# Patient Record
Sex: FEMALE | Race: WHITE | NOT HISPANIC OR LATINO | URBAN - METROPOLITAN AREA
[De-identification: names, ages, dates, MRNs, and addresses within clinical notes are randomized per-mention and may not be internally consistent; named-entity substitution may affect disease eponyms.]

---

## 2018-11-30 PROBLEM — Z00.00 ENCOUNTER FOR PREVENTIVE HEALTH EXAMINATION: Status: ACTIVE | Noted: 2018-11-30

## 2018-12-12 ENCOUNTER — OUTPATIENT (OUTPATIENT)
Dept: OUTPATIENT SERVICES | Facility: HOSPITAL | Age: 63
LOS: 1 days | End: 2018-12-12
Payer: MEDICAID

## 2018-12-12 ENCOUNTER — APPOINTMENT (OUTPATIENT)
Dept: GYNECOLOGIC ONCOLOGY | Facility: CLINIC | Age: 63
End: 2018-12-12
Payer: MEDICAID

## 2018-12-12 ENCOUNTER — RESULT REVIEW (OUTPATIENT)
Age: 63
End: 2018-12-12

## 2018-12-12 VITALS
WEIGHT: 248 LBS | SYSTOLIC BLOOD PRESSURE: 130 MMHG | BODY MASS INDEX: 45.64 KG/M2 | HEIGHT: 62 IN | DIASTOLIC BLOOD PRESSURE: 90 MMHG

## 2018-12-12 DIAGNOSIS — N95.0 POSTMENOPAUSAL BLEEDING: ICD-10-CM

## 2018-12-12 DIAGNOSIS — Z86.39 PERSONAL HISTORY OF OTHER ENDOCRINE, NUTRITIONAL AND METABOLIC DISEASE: ICD-10-CM

## 2018-12-12 DIAGNOSIS — Z80.8 FAMILY HISTORY OF MALIGNANT NEOPLASM OF OTHER ORGANS OR SYSTEMS: ICD-10-CM

## 2018-12-12 DIAGNOSIS — Z80.0 FAMILY HISTORY OF MALIGNANT NEOPLASM OF DIGESTIVE ORGANS: ICD-10-CM

## 2018-12-12 DIAGNOSIS — E03.9 HYPOTHYROIDISM, UNSPECIFIED: ICD-10-CM

## 2018-12-12 DIAGNOSIS — Z80.6 FAMILY HISTORY OF LEUKEMIA: ICD-10-CM

## 2018-12-12 DIAGNOSIS — Z86.79 PERSONAL HISTORY OF OTHER DISEASES OF THE CIRCULATORY SYSTEM: ICD-10-CM

## 2018-12-12 DIAGNOSIS — N85.02 ENDOMETRIAL INTRAEPITHELIAL NEOPLASIA [EIN]: ICD-10-CM

## 2018-12-12 DIAGNOSIS — E11.9 TYPE 2 DIABETES MELLITUS W/OUT COMPLICATIONS: ICD-10-CM

## 2018-12-12 DIAGNOSIS — Z78.9 OTHER SPECIFIED HEALTH STATUS: ICD-10-CM

## 2018-12-12 DIAGNOSIS — R60.0 LOCALIZED EDEMA: ICD-10-CM

## 2018-12-12 DIAGNOSIS — C54.1 MALIGNANT NEOPLASM OF ENDOMETRIUM: ICD-10-CM

## 2018-12-12 DIAGNOSIS — E66.9 OBESITY, UNSPECIFIED: ICD-10-CM

## 2018-12-12 DIAGNOSIS — I10 ESSENTIAL (PRIMARY) HYPERTENSION: ICD-10-CM

## 2018-12-12 PROCEDURE — 88321 CONSLTJ&REPRT SLD PREP ELSWR: CPT

## 2018-12-12 PROCEDURE — 99205 OFFICE O/P NEW HI 60 MIN: CPT

## 2018-12-12 RX ORDER — AMLODIPINE BESYLATE 5 MG/1
TABLET ORAL
Refills: 0 | Status: ACTIVE | COMMUNITY

## 2018-12-12 RX ORDER — HYDROCHLOROTHIAZIDE 12.5 MG/1
TABLET ORAL
Refills: 0 | Status: ACTIVE | COMMUNITY

## 2018-12-12 RX ORDER — ATENOLOL 50 MG/1
TABLET ORAL
Refills: 0 | Status: ACTIVE | COMMUNITY

## 2018-12-12 RX ORDER — ASPIRIN 81 MG
81 TABLET, DELAYED RELEASE (ENTERIC COATED) ORAL
Refills: 0 | Status: ACTIVE | COMMUNITY

## 2018-12-12 RX ORDER — GLIMEPIRIDE 4 MG/1
TABLET ORAL
Refills: 0 | Status: ACTIVE | COMMUNITY

## 2018-12-12 RX ORDER — ALOGLIPTIN 25 MG/1
TABLET, FILM COATED ORAL
Refills: 0 | Status: ACTIVE | COMMUNITY

## 2018-12-12 RX ORDER — GLYBURIDE 2.5 MG/1
TABLET ORAL
Refills: 0 | Status: ACTIVE | COMMUNITY

## 2018-12-12 RX ORDER — DULOXETINE HYDROCHLORIDE 30 MG/1
CAPSULE, DELAYED RELEASE ORAL
Refills: 0 | Status: ACTIVE | COMMUNITY

## 2018-12-12 RX ORDER — ATORVASTATIN CALCIUM 80 MG/1
TABLET, FILM COATED ORAL
Refills: 0 | Status: ACTIVE | COMMUNITY

## 2018-12-12 RX ORDER — LEVOTHYROXINE SODIUM 0.17 MG/1
TABLET ORAL
Refills: 0 | Status: ACTIVE | COMMUNITY

## 2018-12-12 RX ORDER — LOSARTAN POTASSIUM 100 MG/1
TABLET, FILM COATED ORAL
Refills: 0 | Status: ACTIVE | COMMUNITY

## 2018-12-13 LAB — SURGICAL PATHOLOGY STUDY: SIGNIFICANT CHANGE UP

## 2018-12-18 ENCOUNTER — OTHER (OUTPATIENT)
Age: 63
End: 2018-12-18

## 2019-01-15 ENCOUNTER — CHART COPY (OUTPATIENT)
Age: 64
End: 2019-01-15

## 2019-02-20 ENCOUNTER — RESULT REVIEW (OUTPATIENT)
Age: 64
End: 2019-02-20

## 2019-02-26 ENCOUNTER — APPOINTMENT (OUTPATIENT)
Dept: PLASTIC SURGERY | Facility: CLINIC | Age: 64
End: 2019-02-26
Payer: MEDICAID

## 2019-02-26 VITALS
SYSTOLIC BLOOD PRESSURE: 226 MMHG | OXYGEN SATURATION: 97 % | HEIGHT: 62 IN | DIASTOLIC BLOOD PRESSURE: 110 MMHG | HEART RATE: 74 BPM | BODY MASS INDEX: 45.64 KG/M2 | WEIGHT: 248 LBS

## 2019-02-26 DIAGNOSIS — C54.1 MALIGNANT NEOPLASM OF ENDOMETRIUM: ICD-10-CM

## 2019-02-26 PROCEDURE — 99243 OFF/OP CNSLTJ NEW/EST LOW 30: CPT

## 2019-03-05 PROBLEM — C54.1 ENDOMETRIAL ADENOCARCINOMA: Status: ACTIVE | Noted: 2018-12-12

## 2019-03-05 NOTE — HISTORY OF PRESENT ILLNESS
[FreeTextEntry1] : 63 year old , referred by Dr. Macdonald for FIGO grade 1 endometrioid adenocarcinoma. Patient presents today to discuss panniculectomy to be performed at the same time. \par \par Patient went to her GYN, Dr. Escalante, with complaints of postmenopausal bleeding. Pelvic sonogram showed a uterus that measures 11.9 x 8.4 x 4.3 cm with a thickened endometrium and several polyps. D&C on 2018 revealed endometrial hyperplasia with atypia in the endometrial biopsy and FIGO grade 1 endometrial adenocarcinoma in the endometrial polyp. **slides requested for review**\par \par Pelvic US (2018, [place]):\par  Uterus- 11.9 x 8.4 x 4.3 cm; bulky and elongated with prominent posterior subserosal fibroid measuring up to 5 cm. Fundus of uterus is in close proximity to anterior abdominal wall\par  Endometrium- abnormal appearance of endometrial lining. Small amount of fluid are present within the endometrial cavity. Larger endometrial polyp or mass is detected in the central endometrial lining measuring in the range of 2 x 4 cm. Imaging of the lower segment of the uterus in the region of the cervix suggest the presence of echogenic contents within the endometrial cavity and proximal cervical canal\par  Rt. ovary- not seen\par  Lt. ovary- not seen\par  Cul-de-sac- no FF\par \par Surgical pathology ([facility], 2018): D&C\par  Endometrium-endometrial curettage: endometrial hyperplasia with cytologic atypia\par  Endometrium-polyp: endometrial adenocarcinoma, endometrioid type, FIGO grade 1\par \par The patient had seen, Dr. Vibha Hood (GYN/ONC) on 8/10/2018 who recommended a CT scan to r/o metastatic disease and to have a robotic TLH/BSO with frozen section and LND if deep invasion is noted due to her risk of lymphedema. Then, based on final pathology she may require adjuvant radiation treatment. The patient had been scheduled to proceed with surgery, however, Dr. Hood had an accident and cannot do the surgery. \par \par Today, Ms. Macias complains of abnormal vaginal bleeding for many years. She suffers from chronic bilateral LE swelling. She denies abdominal pain/bloating/distention, pelvic discomfort, changes in normal bowel/urinary habits, nausea/vomiting, unintentional weight gain/loss, and all other associated signs and symptoms. She is here to discuss further surgical management. \par \par Health Maintenance:\par Pap- 2018- negative per patient\par Mammo- 2018- negative per patient\par Colonoscopy- denies\par SBE- educated on monthly breast exam\par CBE- 2018 \par

## 2019-03-05 NOTE — PHYSICAL EXAM
[NI] : Normal [de-identified] : large overhanging pannus down to the knees, no sign of infection, no palpable hernia

## 2019-03-13 ENCOUNTER — APPOINTMENT (OUTPATIENT)
Dept: VASCULAR SURGERY | Facility: CLINIC | Age: 64
End: 2019-03-13
Payer: MEDICAID

## 2019-03-13 VITALS
HEART RATE: 76 BPM | TEMPERATURE: 98 F | HEIGHT: 62 IN | DIASTOLIC BLOOD PRESSURE: 106 MMHG | SYSTOLIC BLOOD PRESSURE: 207 MMHG

## 2019-03-13 DIAGNOSIS — I83.899 VARICOSE VEINS OF UNSPECIFIED LOWER EXTREMITY WITH OTHER COMPLICATIONS: ICD-10-CM

## 2019-03-13 DIAGNOSIS — I87.2 VENOUS INSUFFICIENCY (CHRONIC) (PERIPHERAL): ICD-10-CM

## 2019-03-13 DIAGNOSIS — I83.819 VARICOSE VEINS OF UNSPECIFIED LOWER EXTREMITY WITH PAIN: ICD-10-CM

## 2019-03-13 PROCEDURE — 99204 OFFICE O/P NEW MOD 45 MIN: CPT

## 2019-03-13 PROCEDURE — 93970 EXTREMITY STUDY: CPT

## 2019-03-13 PROCEDURE — 93979 VASCULAR STUDY: CPT

## 2019-03-13 NOTE — DATA REVIEWED
[FreeTextEntry1] : Outside facility  report reviewed 1/2/2019 CT Abd Pelvis w/o contrast  no evidence of  thrombosis \par \par 3/13/2019 Venous Doppler Chris le no acute dvt svt \par                            RLE no sono evidence of reflux\par                           LLE no sono evidence of reflux\par \par 3/13/2019  Abd Venous Duplex Venous Duplex  patent bilateral Iliac veins ,  s/o mid IVC thrombosis or compression limited study \par

## 2019-03-13 NOTE — ASSESSMENT
[Arterial/Venous Disease] : arterial/venous disease [Other: _____] : [unfilled] [FreeTextEntry1] : Impression symptomatic venous insuff  s/o due to ivc compression or thrombus\par \par \par Plan Med Conservative management leg elevation, knee high compression stockings 20-30mm Hg vs knee high custom comp stockings  w zipper 20-30mm Hg (rx given), wt loss, diet control\par reviewed w pt and   most recent Sr Cr 1.6\par advised pt to f/u w primary MD for f/u renal fx eval\par pt will need  CTV of Abd/Plevis w iv contrast  to eval ivc patency, pt is aware of the risk of renal insuff \par pt to decide\par \par

## 2019-03-13 NOTE — PHYSICAL EXAM
[Normal Breath Sounds] : Normal breath sounds [1+] : left 1+ [2+] : left 2+ [Ankle Swelling (On Exam)] : present [Ankle Swelling Bilaterally] : severe [Varicose Veins Of Lower Extremities] : bilaterally [Ankle Swelling On The Right] : mild [] : bilaterally [Ankle Swelling On The Left] : moderate [No HSM] : no hepatosplenomegaly [No Rash or Lesion] : No rash or lesion [Alert] : alert [Oriented to Person] : oriented to person [Oriented to Place] : oriented to place [Oriented to Time] : oriented to time [Calm] : calm [JVD] : no jugular venous distention  [Abdomen Masses] : No abdominal masses [Tender] : was nontender [Stool Sample Taken] : No stool obtained  on rectal exam [de-identified] : nad [de-identified] : wnl [FreeTextEntry1] : Moderate bilateral leg venous insufficiency \par w moderate bilateral leg stasis dermatitis \par and moderate to severe bilateral  leg edema \par Multiple  bilateral eg small varicose  veins and spider veins  calf and shin \par no wounds/ulcers\par  [de-identified] : wnl [de-identified] : wnl [de-identified] : Chris Cranial nerves 2-12 chris grossly intact [de-identified] : cooperative

## 2019-03-13 NOTE — HISTORY OF PRESENT ILLNESS
[FreeTextEntry1] : Pt c/o bilateral leg pain swelling heaviness and discomfort worse w activity\par Onset sev years ago \par Intensity mod to severe \par Pt denies recent injury, travel or thrombophilic event\par \par pt was referred by Dr Macdonald to [facility] c/o \par \par \par

## 2020-04-03 ENCOUNTER — INPATIENT (INPATIENT)
Facility: HOSPITAL | Age: 65
LOS: 19 days | DRG: 207 | End: 2020-04-23
Attending: PSYCHIATRY & NEUROLOGY | Admitting: SURGERY
Payer: MEDICAID

## 2020-04-03 VITALS
HEART RATE: 86 BPM | SYSTOLIC BLOOD PRESSURE: 175 MMHG | OXYGEN SATURATION: 77 % | RESPIRATION RATE: 27 BRPM | DIASTOLIC BLOOD PRESSURE: 74 MMHG

## 2020-04-03 DIAGNOSIS — J12.9 VIRAL PNEUMONIA, UNSPECIFIED: ICD-10-CM

## 2020-04-03 LAB
4/8 RATIO: 1.86 RATIO — SIGNIFICANT CHANGE UP (ref 0.9–3.6)
ABS CD8: 77 /UL — LOW (ref 142–740)
ALBUMIN SERPL ELPH-MCNC: 2.4 G/DL — LOW (ref 3.3–5)
ALBUMIN SERPL ELPH-MCNC: 2.6 G/DL — LOW (ref 3.3–5)
ALP SERPL-CCNC: 76 U/L — SIGNIFICANT CHANGE UP (ref 40–120)
ALP SERPL-CCNC: 84 U/L — SIGNIFICANT CHANGE UP (ref 40–120)
ALT FLD-CCNC: 14 U/L — SIGNIFICANT CHANGE UP (ref 10–45)
ALT FLD-CCNC: 9 U/L — LOW (ref 10–45)
ANION GAP SERPL CALC-SCNC: 15 MMOL/L — SIGNIFICANT CHANGE UP (ref 5–17)
ANION GAP SERPL CALC-SCNC: 19 MMOL/L — HIGH (ref 5–17)
APTT BLD: 38.8 SEC — HIGH (ref 27.5–36.3)
APTT BLD: 43.3 SEC — HIGH (ref 27.5–36.3)
AST SERPL-CCNC: 24 U/L — SIGNIFICANT CHANGE UP (ref 10–40)
AST SERPL-CCNC: 33 U/L — SIGNIFICANT CHANGE UP (ref 10–40)
BASOPHILS # BLD AUTO: 0.02 K/UL — SIGNIFICANT CHANGE UP (ref 0–0.2)
BASOPHILS NFR BLD AUTO: 0.2 % — SIGNIFICANT CHANGE UP (ref 0–2)
BILIRUB SERPL-MCNC: 0.2 MG/DL — SIGNIFICANT CHANGE UP (ref 0.2–1.2)
BILIRUB SERPL-MCNC: <0.1 MG/DL — LOW (ref 0.2–1.2)
BUN SERPL-MCNC: 65 MG/DL — HIGH (ref 7–23)
BUN SERPL-MCNC: 67 MG/DL — HIGH (ref 7–23)
CALCIUM SERPL-MCNC: 8.6 MG/DL — SIGNIFICANT CHANGE UP (ref 8.4–10.5)
CALCIUM SERPL-MCNC: 8.7 MG/DL — SIGNIFICANT CHANGE UP (ref 8.4–10.5)
CD3 BLASTS SPEC-ACNC: 232 /UL — LOW (ref 672–1870)
CD3 BLASTS SPEC-ACNC: 62 % — SIGNIFICANT CHANGE UP (ref 59–83)
CD4 %: 39 % — SIGNIFICANT CHANGE UP (ref 30–62)
CD8 %: 21 % — SIGNIFICANT CHANGE UP (ref 12–36)
CHLORIDE SERPL-SCNC: 102 MMOL/L — SIGNIFICANT CHANGE UP (ref 96–108)
CHLORIDE SERPL-SCNC: 103 MMOL/L — SIGNIFICANT CHANGE UP (ref 96–108)
CK SERPL-CCNC: 80 U/L — SIGNIFICANT CHANGE UP (ref 25–170)
CO2 SERPL-SCNC: 14 MMOL/L — LOW (ref 22–31)
CO2 SERPL-SCNC: 18 MMOL/L — LOW (ref 22–31)
CREAT SERPL-MCNC: 3.93 MG/DL — HIGH (ref 0.5–1.3)
CREAT SERPL-MCNC: 4.35 MG/DL — HIGH (ref 0.5–1.3)
CRP SERPL-MCNC: 23.58 MG/DL — HIGH (ref 0–0.4)
CRP SERPL-MCNC: 25.98 MG/DL — HIGH (ref 0–0.4)
D DIMER BLD IA.RAPID-MCNC: 1010 NG/ML DDU — HIGH
D DIMER BLD IA.RAPID-MCNC: 822 NG/ML DDU — HIGH
D DIMER BLD IA.RAPID-MCNC: 865 NG/ML DDU — HIGH
EOSINOPHIL # BLD AUTO: 0 K/UL — SIGNIFICANT CHANGE UP (ref 0–0.5)
EOSINOPHIL NFR BLD AUTO: 0 % — SIGNIFICANT CHANGE UP (ref 0–6)
ERYTHROCYTE [SEDIMENTATION RATE] IN BLOOD: 120 MM/HR — HIGH (ref 0–20)
ERYTHROCYTE [SEDIMENTATION RATE] IN BLOOD: 81 MM/HR — HIGH (ref 0–20)
ERYTHROCYTE [SEDIMENTATION RATE] IN BLOOD: 87 MM/HR — HIGH (ref 0–20)
FERRITIN SERPL-MCNC: 195 NG/ML — HIGH (ref 15–150)
FERRITIN SERPL-MCNC: 195 NG/ML — HIGH (ref 15–150)
FERRITIN SERPL-MCNC: 216 NG/ML — HIGH (ref 15–150)
GAS PNL BLDA: SIGNIFICANT CHANGE UP
GAS PNL BLDV: SIGNIFICANT CHANGE UP
GLUCOSE SERPL-MCNC: 226 MG/DL — HIGH (ref 70–99)
GLUCOSE SERPL-MCNC: 243 MG/DL — HIGH (ref 70–99)
HCT VFR BLD CALC: 27.1 % — LOW (ref 34.5–45)
HCT VFR BLD CALC: 31.3 % — LOW (ref 34.5–45)
HGB BLD-MCNC: 8.4 G/DL — LOW (ref 11.5–15.5)
HGB BLD-MCNC: 9.5 G/DL — LOW (ref 11.5–15.5)
IMM GRANULOCYTES NFR BLD AUTO: 1 % — SIGNIFICANT CHANGE UP (ref 0–1.5)
INR BLD: 1.09 RATIO — SIGNIFICANT CHANGE UP (ref 0.88–1.16)
INR BLD: 1.12 RATIO — SIGNIFICANT CHANGE UP (ref 0.88–1.16)
LACTATE SERPL-SCNC: 0.5 MMOL/L — LOW (ref 0.7–2)
LDH SERPL L TO P-CCNC: 406 U/L — HIGH (ref 50–242)
LDH SERPL L TO P-CCNC: 503 U/L — HIGH (ref 50–242)
LYMPHOCYTES # BLD AUTO: 0.67 K/UL — LOW (ref 1–3.3)
LYMPHOCYTES # BLD AUTO: 7.4 % — LOW (ref 13–44)
MAGNESIUM SERPL-MCNC: 2.1 MG/DL — SIGNIFICANT CHANGE UP (ref 1.6–2.6)
MCHC RBC-ENTMCNC: 25.3 PG — LOW (ref 27–34)
MCHC RBC-ENTMCNC: 25.8 PG — LOW (ref 27–34)
MCHC RBC-ENTMCNC: 30.4 GM/DL — LOW (ref 32–36)
MCHC RBC-ENTMCNC: 31 GM/DL — LOW (ref 32–36)
MCV RBC AUTO: 83.1 FL — SIGNIFICANT CHANGE UP (ref 80–100)
MCV RBC AUTO: 83.2 FL — SIGNIFICANT CHANGE UP (ref 80–100)
MONOCYTES # BLD AUTO: 0.35 K/UL — SIGNIFICANT CHANGE UP (ref 0–0.9)
MONOCYTES NFR BLD AUTO: 3.9 % — SIGNIFICANT CHANGE UP (ref 2–14)
NEUTROPHILS # BLD AUTO: 7.94 K/UL — HIGH (ref 1.8–7.4)
NEUTROPHILS NFR BLD AUTO: 87.5 % — HIGH (ref 43–77)
NRBC # BLD: 0 /100 WBCS — SIGNIFICANT CHANGE UP (ref 0–0)
NRBC # BLD: 0 /100 WBCS — SIGNIFICANT CHANGE UP (ref 0–0)
PHOSPHATE SERPL-MCNC: 6.5 MG/DL — HIGH (ref 2.5–4.5)
PLATELET # BLD AUTO: 204 K/UL — SIGNIFICANT CHANGE UP (ref 150–400)
PLATELET # BLD AUTO: 216 K/UL — SIGNIFICANT CHANGE UP (ref 150–400)
POTASSIUM SERPL-MCNC: 4.3 MMOL/L — SIGNIFICANT CHANGE UP (ref 3.5–5.3)
POTASSIUM SERPL-MCNC: 4.8 MMOL/L — SIGNIFICANT CHANGE UP (ref 3.5–5.3)
POTASSIUM SERPL-SCNC: 4.3 MMOL/L — SIGNIFICANT CHANGE UP (ref 3.5–5.3)
POTASSIUM SERPL-SCNC: 4.8 MMOL/L — SIGNIFICANT CHANGE UP (ref 3.5–5.3)
PROCALCITONIN SERPL-MCNC: 0.57 NG/ML — HIGH (ref 0.02–0.1)
PROT SERPL-MCNC: 6.5 G/DL — SIGNIFICANT CHANGE UP (ref 6–8.3)
PROT SERPL-MCNC: 7.3 G/DL — SIGNIFICANT CHANGE UP (ref 6–8.3)
PROTHROM AB SERPL-ACNC: 12.5 SEC — SIGNIFICANT CHANGE UP (ref 10–12.9)
PROTHROM AB SERPL-ACNC: 12.8 SEC — SIGNIFICANT CHANGE UP (ref 10–12.9)
RBC # BLD: 3.26 M/UL — LOW (ref 3.8–5.2)
RBC # BLD: 3.76 M/UL — LOW (ref 3.8–5.2)
RBC # FLD: 13.1 % — SIGNIFICANT CHANGE UP (ref 10.3–14.5)
RBC # FLD: 13.2 % — SIGNIFICANT CHANGE UP (ref 10.3–14.5)
SODIUM SERPL-SCNC: 135 MMOL/L — SIGNIFICANT CHANGE UP (ref 135–145)
SODIUM SERPL-SCNC: 136 MMOL/L — SIGNIFICANT CHANGE UP (ref 135–145)
T-CELL CD4 SUBSET PNL BLD: 144 /UL — LOW (ref 489–1457)
TROPONIN T, HIGH SENSITIVITY RESULT: 88 NG/L — HIGH (ref 0–51)
WBC # BLD: 8.37 K/UL — SIGNIFICANT CHANGE UP (ref 3.8–10.5)
WBC # BLD: 9.07 K/UL — SIGNIFICANT CHANGE UP (ref 3.8–10.5)
WBC # FLD AUTO: 8.37 K/UL — SIGNIFICANT CHANGE UP (ref 3.8–10.5)
WBC # FLD AUTO: 9.07 K/UL — SIGNIFICANT CHANGE UP (ref 3.8–10.5)

## 2020-04-03 PROCEDURE — 99291 CRITICAL CARE FIRST HOUR: CPT | Mod: 25

## 2020-04-03 PROCEDURE — 71045 X-RAY EXAM CHEST 1 VIEW: CPT | Mod: 26

## 2020-04-03 PROCEDURE — 71045 X-RAY EXAM CHEST 1 VIEW: CPT | Mod: 26,77

## 2020-04-03 PROCEDURE — 36556 INSERT NON-TUNNEL CV CATH: CPT

## 2020-04-03 PROCEDURE — 31500 INSERT EMERGENCY AIRWAY: CPT

## 2020-04-03 PROCEDURE — 93010 ELECTROCARDIOGRAM REPORT: CPT | Mod: 59

## 2020-04-03 RX ORDER — ENOXAPARIN SODIUM 100 MG/ML
30 INJECTION SUBCUTANEOUS DAILY
Refills: 0 | Status: DISCONTINUED | OUTPATIENT
Start: 2020-04-03 | End: 2020-04-07

## 2020-04-03 RX ORDER — FAMOTIDINE 10 MG/ML
20 INJECTION INTRAVENOUS DAILY
Refills: 0 | Status: DISCONTINUED | OUTPATIENT
Start: 2020-04-03 | End: 2020-04-04

## 2020-04-03 RX ORDER — AMLODIPINE BESYLATE 2.5 MG/1
1 TABLET ORAL
Qty: 0 | Refills: 0 | DISCHARGE

## 2020-04-03 RX ORDER — CHLORHEXIDINE GLUCONATE 213 G/1000ML
1 SOLUTION TOPICAL
Refills: 0 | Status: DISCONTINUED | OUTPATIENT
Start: 2020-04-03 | End: 2020-04-04

## 2020-04-03 RX ORDER — KETAMINE HYDROCHLORIDE 100 MG/ML
100 INJECTION INTRAMUSCULAR; INTRAVENOUS ONCE
Refills: 0 | Status: DISCONTINUED | OUTPATIENT
Start: 2020-04-03 | End: 2020-04-04

## 2020-04-03 RX ORDER — HEPARIN SODIUM 5000 [USP'U]/ML
5000 INJECTION INTRAVENOUS; SUBCUTANEOUS EVERY 8 HOURS
Refills: 0 | Status: DISCONTINUED | OUTPATIENT
Start: 2020-04-03 | End: 2020-04-03

## 2020-04-03 RX ORDER — DEXMEDETOMIDINE HYDROCHLORIDE IN 0.9% SODIUM CHLORIDE 4 UG/ML
0.2 INJECTION INTRAVENOUS
Qty: 200 | Refills: 0 | Status: DISCONTINUED | OUTPATIENT
Start: 2020-04-03 | End: 2020-04-04

## 2020-04-03 RX ORDER — NOREPINEPHRINE BITARTRATE/D5W 8 MG/250ML
0.02 PLASTIC BAG, INJECTION (ML) INTRAVENOUS
Qty: 8 | Refills: 0 | Status: DISCONTINUED | OUTPATIENT
Start: 2020-04-03 | End: 2020-04-04

## 2020-04-03 RX ORDER — AZITHROMYCIN 500 MG/1
500 TABLET, FILM COATED ORAL ONCE
Refills: 0 | Status: COMPLETED | OUTPATIENT
Start: 2020-04-03 | End: 2020-04-03

## 2020-04-03 RX ORDER — CEFTRIAXONE 500 MG/1
1000 INJECTION, POWDER, FOR SOLUTION INTRAMUSCULAR; INTRAVENOUS ONCE
Refills: 0 | Status: COMPLETED | OUTPATIENT
Start: 2020-04-03 | End: 2020-04-03

## 2020-04-03 RX ORDER — ROCURONIUM BROMIDE 10 MG/ML
100 VIAL (ML) INTRAVENOUS ONCE
Refills: 0 | Status: COMPLETED | OUTPATIENT
Start: 2020-04-03 | End: 2020-04-03

## 2020-04-03 RX ORDER — FUROSEMIDE 40 MG
1 TABLET ORAL
Qty: 0 | Refills: 0 | DISCHARGE

## 2020-04-03 RX ORDER — LABETALOL HCL 100 MG
1 TABLET ORAL
Qty: 0 | Refills: 0 | DISCHARGE

## 2020-04-03 RX ORDER — ACETAMINOPHEN 500 MG
650 TABLET ORAL EVERY 6 HOURS
Refills: 0 | Status: DISCONTINUED | OUTPATIENT
Start: 2020-04-03 | End: 2020-04-04

## 2020-04-03 RX ORDER — HYDROXYCHLOROQUINE SULFATE 200 MG
400 TABLET ORAL EVERY 12 HOURS
Refills: 0 | Status: COMPLETED | OUTPATIENT
Start: 2020-04-03 | End: 2020-04-04

## 2020-04-03 RX ORDER — CHLORHEXIDINE GLUCONATE 213 G/1000ML
15 SOLUTION TOPICAL EVERY 12 HOURS
Refills: 0 | Status: DISCONTINUED | OUTPATIENT
Start: 2020-04-03 | End: 2020-04-04

## 2020-04-03 RX ORDER — ASPIRIN/CALCIUM CARB/MAGNESIUM 324 MG
1 TABLET ORAL
Qty: 0 | Refills: 0 | DISCHARGE

## 2020-04-03 RX ORDER — DULOXETINE HYDROCHLORIDE 30 MG/1
1 CAPSULE, DELAYED RELEASE ORAL
Qty: 0 | Refills: 0 | DISCHARGE

## 2020-04-03 RX ORDER — GLIMEPIRIDE 1 MG
1 TABLET ORAL
Qty: 0 | Refills: 0 | DISCHARGE

## 2020-04-03 RX ORDER — LEVOTHYROXINE SODIUM 125 MCG
1 TABLET ORAL
Qty: 0 | Refills: 0 | DISCHARGE

## 2020-04-03 RX ORDER — LINAGLIPTIN 5 MG/1
1 TABLET, FILM COATED ORAL
Qty: 0 | Refills: 0 | DISCHARGE

## 2020-04-03 RX ORDER — HYDROXYCHLOROQUINE SULFATE 200 MG
TABLET ORAL
Refills: 0 | Status: DISCONTINUED | OUTPATIENT
Start: 2020-04-03 | End: 2020-04-06

## 2020-04-03 RX ORDER — KETAMINE HYDROCHLORIDE 100 MG/ML
1 INJECTION INTRAMUSCULAR; INTRAVENOUS
Qty: 1000 | Refills: 0 | Status: DISCONTINUED | OUTPATIENT
Start: 2020-04-03 | End: 2020-04-04

## 2020-04-03 RX ORDER — PROPOFOL 10 MG/ML
45 INJECTION, EMULSION INTRAVENOUS
Qty: 500 | Refills: 0 | Status: DISCONTINUED | OUTPATIENT
Start: 2020-04-03 | End: 2020-04-13

## 2020-04-03 RX ORDER — HYDROXYCHLOROQUINE SULFATE 200 MG
200 TABLET ORAL EVERY 12 HOURS
Refills: 0 | Status: DISCONTINUED | OUTPATIENT
Start: 2020-04-04 | End: 2020-04-06

## 2020-04-03 RX ORDER — HYDRALAZINE HCL 50 MG
0 TABLET ORAL
Qty: 0 | Refills: 0 | DISCHARGE

## 2020-04-03 RX ORDER — ATORVASTATIN CALCIUM 80 MG/1
1 TABLET, FILM COATED ORAL
Qty: 0 | Refills: 0 | DISCHARGE

## 2020-04-03 RX ORDER — ETOMIDATE 2 MG/ML
20 INJECTION INTRAVENOUS ONCE
Refills: 0 | Status: COMPLETED | OUTPATIENT
Start: 2020-04-03 | End: 2020-04-03

## 2020-04-03 RX ORDER — NOREPINEPHRINE BITARTRATE/D5W 8 MG/250ML
0.01 PLASTIC BAG, INJECTION (ML) INTRAVENOUS
Qty: 16 | Refills: 0 | Status: DISCONTINUED | OUTPATIENT
Start: 2020-04-03 | End: 2020-04-07

## 2020-04-03 RX ADMIN — CHLORHEXIDINE GLUCONATE 15 MILLILITER(S): 213 SOLUTION TOPICAL at 17:26

## 2020-04-03 RX ADMIN — FAMOTIDINE 20 MILLIGRAM(S): 10 INJECTION INTRAVENOUS at 23:39

## 2020-04-03 RX ADMIN — CEFTRIAXONE 100 MILLIGRAM(S): 500 INJECTION, POWDER, FOR SOLUTION INTRAMUSCULAR; INTRAVENOUS at 13:59

## 2020-04-03 RX ADMIN — ETOMIDATE 20 MILLIGRAM(S): 2 INJECTION INTRAVENOUS at 12:55

## 2020-04-03 RX ADMIN — CEFTRIAXONE 1000 MILLIGRAM(S): 500 INJECTION, POWDER, FOR SOLUTION INTRAMUSCULAR; INTRAVENOUS at 14:10

## 2020-04-03 RX ADMIN — PROPOFOL 12 MICROGRAM(S)/KG/MIN: 10 INJECTION, EMULSION INTRAVENOUS at 14:33

## 2020-04-03 RX ADMIN — Medication 100 MILLIGRAM(S): at 12:50

## 2020-04-03 RX ADMIN — CHLORHEXIDINE GLUCONATE 1 APPLICATION(S): 213 SOLUTION TOPICAL at 22:00

## 2020-04-03 RX ADMIN — AZITHROMYCIN 250 MILLIGRAM(S): 500 TABLET, FILM COATED ORAL at 13:59

## 2020-04-03 NOTE — ED PROVIDER NOTE - ATTENDING CONTRIBUTION TO CARE
65F, pmh htn, hld, DM II, presents with sob, worsening over past week, fever. Pt was satting 70%, was using family member's O2 bringin sat to 80-90%. Today desaturated which prompted ED visit. Pt dyspneic, in distress on arrival, unable to obtain further hx.    PE: NAD, NCAT, MMM, Trachea midline, Normal conjunctiva, lungs with decreased breath sounds throughout, tachypnea, increased wob, S1/S2 RRR, Normal perfusion, 2+ radial pulses bilat, Abdomen Soft, NTND, No rebound/guarding, No LE edema, No deformity of extremities, No rashes,  No focal motor or sensory deficits.     Pt hypoxic to 60% on arrival on RA. Placed on NRB with 6L NC with improvement in saturation to only low 80's. With tachypnea, increased wob, and unable to speak in full sentences. Given inability to improve oxygenation further, decision made to intubate, pt agreeable and family informed. To obtain labs, cxr. Admit to ICU. - Lion Treadwell MD

## 2020-04-03 NOTE — ED ADULT NURSE NOTE - NSIMPLEMENTINTERV_GEN_ALL_ED
Implemented All Fall with Harm Risk Interventions:  [place] to call system. Call bell, personal items and telephone within reach. Instruct patient to call for assistance. Room bathroom lighting operational. Non-slip footwear when patient is off stretcher. Physically safe environment: no spills, clutter or unnecessary equipment. Stretcher in lowest position, wheels locked, appropriate side rails in place. Provide visual cue, wrist band, yellow gown, etc. Monitor gait and stability. Monitor for mental status changes and reorient to person, place, and time. Review medications for side effects contributing to fall risk. Reinforce activity limits and safety measures with patient and family. Provide visual clues: red socks.

## 2020-04-03 NOTE — ED PROVIDER NOTE - PROGRESS NOTE DETAILS
Pt intubated successfully. During intubation, first attempt unsuccessful. Attempted to bag patient unsuccessfully, then second attempt successful. Did desaturate to upper 50's for very brief period however rapid improvement once tube secured. Pt sedated with propofol s/p intubation. Accepted to ICU dr chacon. - Lion Treadwell MD

## 2020-04-03 NOTE — ED PROVIDER NOTE - CRITICAL CARE PROVIDED
additional history taking/consult w/ pt's family directly relating to pts condition/interpretation of diagnostic studies/consultation with other physicians/documentation/direct patient care (not related to procedure)

## 2020-04-03 NOTE — ED PROVIDER NOTE - OBJECTIVE STATEMENT
64 yo female with htn dm hld presenting with SOB for one week. per patient's , patient had one week of SOB, fevers. Was saturating 70% at home, where patient used family member's oxygen, bringing up to 80-90%. today was 60% on RA so came to ED for further evaluation.     Denies N/V/D, CP

## 2020-04-03 NOTE — ED PROVIDER NOTE - CLINICAL SUMMARY MEDICAL DECISION MAKING FREE TEXT BOX
64 yo female with htn dm hld presenting with SOB for one week. suggestive of respiratory distress 2/2 viral pna such as covid-19. will likely intubate for airway management, will obtain cbc cmp crp vbg, cxr, will reassess

## 2020-04-03 NOTE — ED ADULT NURSE NOTE - OBJECTIVE STATEMENT
pT IS AN AMBULATORY 65 YR OLD FEMALE A/O x 2 BIBA FOR sob, RESPIRATORY DISTRESS.  UPON ARRIVAL PT WAS 60% ON ROOM AIR AND WITH ASSISTANCE FROM nrb AND NASAL CANNULA NEVER WENT HIGHER THAN 85% oxygen saturation.  PERRL wnl, chen spontaneously.  LUNGS have diminished bs b/l.  SINUS tachycardia on cm at 105 bpm.  As per EMS patient has had fever and SOB.  No signs of N/V.  Abdomen NT ND.  Peripheral pulses.  Pt intubated at 1250 for respiratory distress at 1250 with size 7.5 tube, 25@ the lip line.  100 MG Rocoronium and 20 mg Etomidate used for RSI.  ET Tube confirmed with CXR.

## 2020-04-03 NOTE — PROCEDURE NOTE - NSTRACHPOSTINTU_RESP_A_CORE
Breath sounds equal/Chest X-Ray/Appropriate capnography/Breath sounds bilateral/Positive end tidal Co2 noted

## 2020-04-03 NOTE — ED PROVIDER NOTE - CARE PLAN
Principal Discharge DX:	Viral pneumonia Principal Discharge DX:	Viral pneumonia  Secondary Diagnosis:	Acute respiratory failure with hypoxia

## 2020-04-04 DIAGNOSIS — E78.5 HYPERLIPIDEMIA, UNSPECIFIED: ICD-10-CM

## 2020-04-04 DIAGNOSIS — I10 ESSENTIAL (PRIMARY) HYPERTENSION: ICD-10-CM

## 2020-04-04 DIAGNOSIS — B34.2 CORONAVIRUS INFECTION, UNSPECIFIED: ICD-10-CM

## 2020-04-04 DIAGNOSIS — E11.9 TYPE 2 DIABETES MELLITUS WITHOUT COMPLICATIONS: ICD-10-CM

## 2020-04-04 LAB
ALBUMIN SERPL ELPH-MCNC: 2.4 G/DL — LOW (ref 3.3–5)
ALP SERPL-CCNC: 70 U/L — SIGNIFICANT CHANGE UP (ref 40–120)
ALT FLD-CCNC: 10 U/L — SIGNIFICANT CHANGE UP (ref 10–45)
ANION GAP SERPL CALC-SCNC: 16 MMOL/L — SIGNIFICANT CHANGE UP (ref 5–17)
ANION GAP SERPL CALC-SCNC: 16 MMOL/L — SIGNIFICANT CHANGE UP (ref 5–17)
AST SERPL-CCNC: 16 U/L — SIGNIFICANT CHANGE UP (ref 10–40)
BILIRUB SERPL-MCNC: 0.1 MG/DL — LOW (ref 0.2–1.2)
BUN SERPL-MCNC: 67 MG/DL — HIGH (ref 7–23)
BUN SERPL-MCNC: 68 MG/DL — HIGH (ref 7–23)
CALCIUM SERPL-MCNC: 8.1 MG/DL — LOW (ref 8.4–10.5)
CALCIUM SERPL-MCNC: 8.6 MG/DL — SIGNIFICANT CHANGE UP (ref 8.4–10.5)
CHLORIDE SERPL-SCNC: 103 MMOL/L — SIGNIFICANT CHANGE UP (ref 96–108)
CHLORIDE SERPL-SCNC: 105 MMOL/L — SIGNIFICANT CHANGE UP (ref 96–108)
CK SERPL-CCNC: 48 U/L — SIGNIFICANT CHANGE UP (ref 25–170)
CO2 SERPL-SCNC: 15 MMOL/L — LOW (ref 22–31)
CO2 SERPL-SCNC: 17 MMOL/L — LOW (ref 22–31)
CREAT SERPL-MCNC: 4.6 MG/DL — HIGH (ref 0.5–1.3)
CREAT SERPL-MCNC: 4.89 MG/DL — HIGH (ref 0.5–1.3)
CRP SERPL-MCNC: 22.54 MG/DL — HIGH (ref 0–0.4)
FIBRINOGEN PPP-MCNC: 934 MG/DL — HIGH (ref 350–510)
GAS PNL BLDA: SIGNIFICANT CHANGE UP
GAS PNL BLDA: SIGNIFICANT CHANGE UP
GLUCOSE BLDC GLUCOMTR-MCNC: 138 MG/DL — HIGH (ref 70–99)
GLUCOSE BLDC GLUCOMTR-MCNC: 162 MG/DL — HIGH (ref 70–99)
GLUCOSE SERPL-MCNC: 125 MG/DL — HIGH (ref 70–99)
GLUCOSE SERPL-MCNC: 156 MG/DL — HIGH (ref 70–99)
HCT VFR BLD CALC: 25.3 % — LOW (ref 34.5–45)
HGB BLD-MCNC: 8.1 G/DL — LOW (ref 11.5–15.5)
INR BLD: 1.12 RATIO — SIGNIFICANT CHANGE UP (ref 0.88–1.16)
MAGNESIUM SERPL-MCNC: 2.1 MG/DL — SIGNIFICANT CHANGE UP (ref 1.6–2.6)
MAGNESIUM SERPL-MCNC: 2.2 MG/DL — SIGNIFICANT CHANGE UP (ref 1.6–2.6)
MCHC RBC-ENTMCNC: 26.4 PG — LOW (ref 27–34)
MCHC RBC-ENTMCNC: 32 GM/DL — SIGNIFICANT CHANGE UP (ref 32–36)
MCV RBC AUTO: 82.4 FL — SIGNIFICANT CHANGE UP (ref 80–100)
NRBC # BLD: 0 /100 WBCS — SIGNIFICANT CHANGE UP (ref 0–0)
NT-PROBNP SERPL-SCNC: 3479 PG/ML — HIGH (ref 0–300)
PHOSPHATE SERPL-MCNC: 7 MG/DL — HIGH (ref 2.5–4.5)
PLATELET # BLD AUTO: 216 K/UL — SIGNIFICANT CHANGE UP (ref 150–400)
POTASSIUM SERPL-MCNC: 4.3 MMOL/L — SIGNIFICANT CHANGE UP (ref 3.5–5.3)
POTASSIUM SERPL-MCNC: 4.4 MMOL/L — SIGNIFICANT CHANGE UP (ref 3.5–5.3)
POTASSIUM SERPL-SCNC: 4.3 MMOL/L — SIGNIFICANT CHANGE UP (ref 3.5–5.3)
POTASSIUM SERPL-SCNC: 4.4 MMOL/L — SIGNIFICANT CHANGE UP (ref 3.5–5.3)
PROCALCITONIN SERPL-MCNC: 0.8 NG/ML — HIGH (ref 0.02–0.1)
PROT SERPL-MCNC: 6 G/DL — SIGNIFICANT CHANGE UP (ref 6–8.3)
PROTHROM AB SERPL-ACNC: 12.8 SEC — SIGNIFICANT CHANGE UP (ref 10–12.9)
RBC # BLD: 3.07 M/UL — LOW (ref 3.8–5.2)
RBC # FLD: 13.3 % — SIGNIFICANT CHANGE UP (ref 10.3–14.5)
SARS-COV-2 RNA SPEC QL NAA+PROBE: DETECTED
SODIUM SERPL-SCNC: 136 MMOL/L — SIGNIFICANT CHANGE UP (ref 135–145)
SODIUM SERPL-SCNC: 136 MMOL/L — SIGNIFICANT CHANGE UP (ref 135–145)
TROPONIN T, HIGH SENSITIVITY RESULT: 93 NG/L — HIGH (ref 0–51)
WBC # BLD: 7.94 K/UL — SIGNIFICANT CHANGE UP (ref 3.8–10.5)
WBC # FLD AUTO: 7.94 K/UL — SIGNIFICANT CHANGE UP (ref 3.8–10.5)

## 2020-04-04 PROCEDURE — 93010 ELECTROCARDIOGRAM REPORT: CPT

## 2020-04-04 PROCEDURE — 99291 CRITICAL CARE FIRST HOUR: CPT

## 2020-04-04 RX ORDER — DEXTROSE 50 % IN WATER 50 %
25 SYRINGE (ML) INTRAVENOUS ONCE
Refills: 0 | Status: DISCONTINUED | OUTPATIENT
Start: 2020-04-04 | End: 2020-04-04

## 2020-04-04 RX ORDER — DULOXETINE HYDROCHLORIDE 30 MG/1
60 CAPSULE, DELAYED RELEASE ORAL DAILY
Refills: 0 | Status: DISCONTINUED | OUTPATIENT
Start: 2020-04-04 | End: 2020-04-04

## 2020-04-04 RX ORDER — LEVOTHYROXINE SODIUM 125 MCG
200 TABLET ORAL DAILY
Refills: 0 | Status: DISCONTINUED | OUTPATIENT
Start: 2020-04-04 | End: 2020-04-04

## 2020-04-04 RX ORDER — INSULIN LISPRO 100/ML
VIAL (ML) SUBCUTANEOUS AT BEDTIME
Refills: 0 | Status: DISCONTINUED | OUTPATIENT
Start: 2020-04-04 | End: 2020-04-04

## 2020-04-04 RX ORDER — DEXTROSE 50 % IN WATER 50 %
12.5 SYRINGE (ML) INTRAVENOUS ONCE
Refills: 0 | Status: DISCONTINUED | OUTPATIENT
Start: 2020-04-04 | End: 2020-04-04

## 2020-04-04 RX ORDER — INSULIN LISPRO 100/ML
VIAL (ML) SUBCUTANEOUS
Refills: 0 | Status: DISCONTINUED | OUTPATIENT
Start: 2020-04-04 | End: 2020-04-04

## 2020-04-04 RX ORDER — ASPIRIN/CALCIUM CARB/MAGNESIUM 324 MG
81 TABLET ORAL DAILY
Refills: 0 | Status: DISCONTINUED | OUTPATIENT
Start: 2020-04-04 | End: 2020-04-16

## 2020-04-04 RX ORDER — SODIUM CHLORIDE 9 MG/ML
1000 INJECTION, SOLUTION INTRAVENOUS
Refills: 0 | Status: DISCONTINUED | OUTPATIENT
Start: 2020-04-04 | End: 2020-04-04

## 2020-04-04 RX ORDER — LEVOTHYROXINE SODIUM 125 MCG
100 TABLET ORAL AT BEDTIME
Refills: 0 | Status: DISCONTINUED | OUTPATIENT
Start: 2020-04-04 | End: 2020-04-23

## 2020-04-04 RX ORDER — GLUCAGON INJECTION, SOLUTION 0.5 MG/.1ML
1 INJECTION, SOLUTION SUBCUTANEOUS ONCE
Refills: 0 | Status: DISCONTINUED | OUTPATIENT
Start: 2020-04-04 | End: 2020-04-04

## 2020-04-04 RX ORDER — DEXTROSE 50 % IN WATER 50 %
15 SYRINGE (ML) INTRAVENOUS ONCE
Refills: 0 | Status: DISCONTINUED | OUTPATIENT
Start: 2020-04-04 | End: 2020-04-04

## 2020-04-04 RX ORDER — ACETAMINOPHEN 500 MG
650 TABLET ORAL EVERY 6 HOURS
Refills: 0 | Status: DISCONTINUED | OUTPATIENT
Start: 2020-04-04 | End: 2020-04-12

## 2020-04-04 RX ORDER — INSULIN LISPRO 100/ML
VIAL (ML) SUBCUTANEOUS EVERY 6 HOURS
Refills: 0 | Status: DISCONTINUED | OUTPATIENT
Start: 2020-04-04 | End: 2020-04-09

## 2020-04-04 RX ORDER — KETAMINE HYDROCHLORIDE 100 MG/ML
1 INJECTION INTRAMUSCULAR; INTRAVENOUS
Qty: 1000 | Refills: 0 | Status: DISCONTINUED | OUTPATIENT
Start: 2020-04-04 | End: 2020-04-05

## 2020-04-04 RX ORDER — ATORVASTATIN CALCIUM 80 MG/1
20 TABLET, FILM COATED ORAL AT BEDTIME
Refills: 0 | Status: DISCONTINUED | OUTPATIENT
Start: 2020-04-04 | End: 2020-04-15

## 2020-04-04 RX ADMIN — ENOXAPARIN SODIUM 30 MILLIGRAM(S): 100 INJECTION SUBCUTANEOUS at 12:50

## 2020-04-04 RX ADMIN — Medication 400 MILLIGRAM(S): at 01:48

## 2020-04-04 RX ADMIN — DULOXETINE HYDROCHLORIDE 60 MILLIGRAM(S): 30 CAPSULE, DELAYED RELEASE ORAL at 12:50

## 2020-04-04 RX ADMIN — CHLORHEXIDINE GLUCONATE 15 MILLILITER(S): 213 SOLUTION TOPICAL at 05:25

## 2020-04-04 RX ADMIN — Medication 200 MICROGRAM(S): at 05:26

## 2020-04-04 RX ADMIN — FAMOTIDINE 20 MILLIGRAM(S): 10 INJECTION INTRAVENOUS at 12:50

## 2020-04-04 RX ADMIN — Medication 200 MILLIGRAM(S): at 21:45

## 2020-04-04 RX ADMIN — Medication 400 MILLIGRAM(S): at 12:50

## 2020-04-04 RX ADMIN — CHLORHEXIDINE GLUCONATE 1 APPLICATION(S): 213 SOLUTION TOPICAL at 05:25

## 2020-04-04 NOTE — H&P ADULT - ATTENDING COMMENTS
COVID sedation protocol.  Presently on Ketamine 1 mg per kilogram per hour, Propofol 30 microgram per kilogram per hour.  FIORDALIZA -2   synchronous on ventilator.  Will work to decrease Ketamine  Levophed at low dose  PEEP 14 and 30 percent oxygen with acceptable gas exchange  following ARDS net high PEEP protocol  creatinine is 3.9 and urine output of 10.  CVP is 10 suggestive of optimal volume status and will continue to closely monitor renal function.  goal will be to balance I's and O's

## 2020-04-04 NOTE — H&P ADULT - ASSESSMENT
61 yo F admitted with + COVID- 19   -  upon admission she was intubated and is currently on the ventilator 64 yo F admitted with + COVID- 19   -  upon admission she was intubated and is currently on the ventilator 66 yo F admitted with + COVID- 19   -  upon admission she was intubated and is currently on the ventilator     Neuro   - propofol @35 mcg/kg/min  - Ketamine @ 1mg      CV  - Levo gtt 0.01 mcg/ kg/min     Pulmonary  - AC/CMV 32/320/30% /14  - plateau 26   - ABG 4/4  7.26/ 40/ 86/ 17/ -8.6/ 97%  - TV/PBW =  8.2   - P/F = 268     GI  - NPO, OGT       - Glass cath  - BUN/CREAT 67/4.89     Heme   - Lovenox 30 mg SQ daily  - H/H  8.1/25.3 --> 7.4/28     ID  - plaquenil

## 2020-04-04 NOTE — H&P ADULT - NSHPLABSRESULTS_GEN_ALL_CORE
8.1    7.94  )-----------( 216      ( 04 Apr 2020 03:17 )             25.3   04-04    136  |  103  |  68<H>  ----------------------------<  156<H>  4.3   |  17<L>  |  4.60<H>    Ca    8.6      04 Apr 2020 03:17  Phos  7.0     04-04  Mg     2.2     04-04    TPro  6.0  /  Alb  2.4<L>  /  TBili  0.1<L>  /  DBili  x   /  AST  16  /  ALT  10  /  AlkPhos  70  04-04 8.1    7.94  )-----------( 216      ( 04 Apr 2020 03:17 )             25.3   04-04    136  |  103  |  68<H>  ----------------------------<  156<H>  4.3   |  17<L>  |  4.60<H>    Ca    8.6      04 Apr 2020 03:17  Phos  7.0     04-04  Mg     2.2     04-04    TPro  6.0  /  Alb  2.4<L>  /  TBili  0.1<L>  /  DBili  x   /  AST  16  /  ALT  10  /  AlkPhos  70  04-04    EKG- NSR with

## 2020-04-04 NOTE — H&P ADULT - NSHPSOURCEINFOTX_GEN_ALL_CORE
unable to reach patient's spouse  message left on home phone , and patient is intubated history obtained from chart records

## 2020-04-04 NOTE — H&P ADULT - HISTORY OF PRESENT ILLNESS
64 yo female withHTN, DM, HLD,   presenting with SOB for one week. per patient's , patient had one week of SOB, fevers. Was saturating 70% at home, where patient used family member's oxygen, bringing up to 80-90%. today was 60% on RA so came to ED for further evaluation.       Unable to obtain ROS

## 2020-04-05 LAB
ALBUMIN SERPL ELPH-MCNC: 2.1 G/DL — LOW (ref 3.3–5)
ALP SERPL-CCNC: 73 U/L — SIGNIFICANT CHANGE UP (ref 40–120)
ALT FLD-CCNC: 9 U/L — LOW (ref 10–45)
ANION GAP SERPL CALC-SCNC: 17 MMOL/L — SIGNIFICANT CHANGE UP (ref 5–17)
ANION GAP SERPL CALC-SCNC: 17 MMOL/L — SIGNIFICANT CHANGE UP (ref 5–17)
AST SERPL-CCNC: 13 U/L — SIGNIFICANT CHANGE UP (ref 10–40)
BASOPHILS # BLD AUTO: 0.01 K/UL — SIGNIFICANT CHANGE UP (ref 0–0.2)
BASOPHILS NFR BLD AUTO: 0.2 % — SIGNIFICANT CHANGE UP (ref 0–2)
BILIRUB DIRECT SERPL-MCNC: <0.1 MG/DL — SIGNIFICANT CHANGE UP (ref 0–0.2)
BILIRUB INDIRECT FLD-MCNC: >0 MG/DL — LOW (ref 0.2–1)
BILIRUB SERPL-MCNC: 0.1 MG/DL — LOW (ref 0.2–1.2)
BUN SERPL-MCNC: 71 MG/DL — HIGH (ref 7–23)
BUN SERPL-MCNC: 73 MG/DL — HIGH (ref 7–23)
CALCIUM SERPL-MCNC: 7.5 MG/DL — LOW (ref 8.4–10.5)
CALCIUM SERPL-MCNC: 7.9 MG/DL — LOW (ref 8.4–10.5)
CHLORIDE SERPL-SCNC: 102 MMOL/L — SIGNIFICANT CHANGE UP (ref 96–108)
CHLORIDE SERPL-SCNC: 103 MMOL/L — SIGNIFICANT CHANGE UP (ref 96–108)
CO2 SERPL-SCNC: 14 MMOL/L — LOW (ref 22–31)
CO2 SERPL-SCNC: 16 MMOL/L — LOW (ref 22–31)
CREAT SERPL-MCNC: 5.24 MG/DL — HIGH (ref 0.5–1.3)
CREAT SERPL-MCNC: 5.52 MG/DL — HIGH (ref 0.5–1.3)
EOSINOPHIL # BLD AUTO: 0.03 K/UL — SIGNIFICANT CHANGE UP (ref 0–0.5)
EOSINOPHIL NFR BLD AUTO: 0.5 % — SIGNIFICANT CHANGE UP (ref 0–6)
G6PD RBC-CCNC: 17.4 U/G HGB — SIGNIFICANT CHANGE UP (ref 7–20.5)
GAS PNL BLDA: SIGNIFICANT CHANGE UP
GLUCOSE BLDC GLUCOMTR-MCNC: 127 MG/DL — HIGH (ref 70–99)
GLUCOSE BLDC GLUCOMTR-MCNC: 140 MG/DL — HIGH (ref 70–99)
GLUCOSE BLDC GLUCOMTR-MCNC: 175 MG/DL — HIGH (ref 70–99)
GLUCOSE BLDC GLUCOMTR-MCNC: 202 MG/DL — HIGH (ref 70–99)
GLUCOSE SERPL-MCNC: 163 MG/DL — HIGH (ref 70–99)
GLUCOSE SERPL-MCNC: 173 MG/DL — HIGH (ref 70–99)
HCT VFR BLD CALC: 24.6 % — LOW (ref 34.5–45)
HGB BLD-MCNC: 7.7 G/DL — LOW (ref 11.5–15.5)
IMM GRANULOCYTES NFR BLD AUTO: 1.8 % — HIGH (ref 0–1.5)
LYMPHOCYTES # BLD AUTO: 0.75 K/UL — LOW (ref 1–3.3)
LYMPHOCYTES # BLD AUTO: 12.1 % — LOW (ref 13–44)
MAGNESIUM SERPL-MCNC: 2.1 MG/DL — SIGNIFICANT CHANGE UP (ref 1.6–2.6)
MAGNESIUM SERPL-MCNC: 2.1 MG/DL — SIGNIFICANT CHANGE UP (ref 1.6–2.6)
MCHC RBC-ENTMCNC: 25.6 PG — LOW (ref 27–34)
MCHC RBC-ENTMCNC: 31.3 GM/DL — LOW (ref 32–36)
MCV RBC AUTO: 81.7 FL — SIGNIFICANT CHANGE UP (ref 80–100)
MONOCYTES # BLD AUTO: 0.38 K/UL — SIGNIFICANT CHANGE UP (ref 0–0.9)
MONOCYTES NFR BLD AUTO: 6.1 % — SIGNIFICANT CHANGE UP (ref 2–14)
NEUTROPHILS # BLD AUTO: 4.94 K/UL — SIGNIFICANT CHANGE UP (ref 1.8–7.4)
NEUTROPHILS NFR BLD AUTO: 79.3 % — HIGH (ref 43–77)
NRBC # BLD: 0 /100 WBCS — SIGNIFICANT CHANGE UP (ref 0–0)
PHOSPHATE SERPL-MCNC: 6.8 MG/DL — HIGH (ref 2.5–4.5)
PHOSPHATE SERPL-MCNC: 6.9 MG/DL — HIGH (ref 2.5–4.5)
PLATELET # BLD AUTO: 199 K/UL — SIGNIFICANT CHANGE UP (ref 150–400)
POTASSIUM SERPL-MCNC: 4.5 MMOL/L — SIGNIFICANT CHANGE UP (ref 3.5–5.3)
POTASSIUM SERPL-MCNC: 4.6 MMOL/L — SIGNIFICANT CHANGE UP (ref 3.5–5.3)
POTASSIUM SERPL-SCNC: 4.5 MMOL/L — SIGNIFICANT CHANGE UP (ref 3.5–5.3)
POTASSIUM SERPL-SCNC: 4.6 MMOL/L — SIGNIFICANT CHANGE UP (ref 3.5–5.3)
PROCALCITONIN SERPL-MCNC: 0.82 NG/ML — HIGH (ref 0.02–0.1)
PROT SERPL-MCNC: 5.7 G/DL — LOW (ref 6–8.3)
RBC # BLD: 3.01 M/UL — LOW (ref 3.8–5.2)
RBC # FLD: 13.2 % — SIGNIFICANT CHANGE UP (ref 10.3–14.5)
SODIUM SERPL-SCNC: 133 MMOL/L — LOW (ref 135–145)
SODIUM SERPL-SCNC: 136 MMOL/L — SIGNIFICANT CHANGE UP (ref 135–145)
WBC # BLD: 6.22 K/UL — SIGNIFICANT CHANGE UP (ref 3.8–10.5)
WBC # FLD AUTO: 6.22 K/UL — SIGNIFICANT CHANGE UP (ref 3.8–10.5)

## 2020-04-05 PROCEDURE — 99291 CRITICAL CARE FIRST HOUR: CPT

## 2020-04-05 RX ORDER — FUROSEMIDE 40 MG
20 TABLET ORAL DAILY
Refills: 0 | Status: DISCONTINUED | OUTPATIENT
Start: 2020-04-05 | End: 2020-04-05

## 2020-04-05 RX ORDER — ROCURONIUM BROMIDE 10 MG/ML
50 VIAL (ML) INTRAVENOUS ONCE
Refills: 0 | Status: DISCONTINUED | OUTPATIENT
Start: 2020-04-05 | End: 2020-04-07

## 2020-04-05 RX ORDER — POLYETHYLENE GLYCOL 3350 17 G/17G
17 POWDER, FOR SOLUTION ORAL DAILY
Refills: 0 | Status: DISCONTINUED | OUTPATIENT
Start: 2020-04-05 | End: 2020-04-06

## 2020-04-05 RX ORDER — CHLORHEXIDINE GLUCONATE 213 G/1000ML
15 SOLUTION TOPICAL EVERY 12 HOURS
Refills: 0 | Status: DISCONTINUED | OUTPATIENT
Start: 2020-04-05 | End: 2020-04-20

## 2020-04-05 RX ORDER — KETAMINE HYDROCHLORIDE 100 MG/ML
1 INJECTION INTRAMUSCULAR; INTRAVENOUS
Qty: 1000 | Refills: 0 | Status: DISCONTINUED | OUTPATIENT
Start: 2020-04-05 | End: 2020-04-07

## 2020-04-05 RX ORDER — MIDAZOLAM HYDROCHLORIDE 1 MG/ML
2 INJECTION, SOLUTION INTRAMUSCULAR; INTRAVENOUS ONCE
Refills: 0 | Status: DISCONTINUED | OUTPATIENT
Start: 2020-04-05 | End: 2020-04-05

## 2020-04-05 RX ORDER — SENNA PLUS 8.6 MG/1
2 TABLET ORAL AT BEDTIME
Refills: 0 | Status: DISCONTINUED | OUTPATIENT
Start: 2020-04-05 | End: 2020-04-10

## 2020-04-05 RX ORDER — DEXMEDETOMIDINE HYDROCHLORIDE IN 0.9% SODIUM CHLORIDE 4 UG/ML
0.5 INJECTION INTRAVENOUS
Qty: 400 | Refills: 0 | Status: DISCONTINUED | OUTPATIENT
Start: 2020-04-05 | End: 2020-04-12

## 2020-04-05 RX ADMIN — SENNA PLUS 2 TABLET(S): 8.6 TABLET ORAL at 21:11

## 2020-04-05 RX ADMIN — MIDAZOLAM HYDROCHLORIDE 2 MILLIGRAM(S): 1 INJECTION, SOLUTION INTRAMUSCULAR; INTRAVENOUS at 20:00

## 2020-04-05 RX ADMIN — Medication 2: at 00:19

## 2020-04-05 RX ADMIN — POLYETHYLENE GLYCOL 3350 17 GRAM(S): 17 POWDER, FOR SOLUTION ORAL at 12:04

## 2020-04-05 RX ADMIN — ATORVASTATIN CALCIUM 20 MILLIGRAM(S): 80 TABLET, FILM COATED ORAL at 21:11

## 2020-04-05 RX ADMIN — CHLORHEXIDINE GLUCONATE 15 MILLILITER(S): 213 SOLUTION TOPICAL at 07:31

## 2020-04-05 RX ADMIN — ENOXAPARIN SODIUM 30 MILLIGRAM(S): 100 INJECTION SUBCUTANEOUS at 12:04

## 2020-04-05 RX ADMIN — Medication 1 DROP(S): at 04:15

## 2020-04-05 RX ADMIN — Medication 200 MILLIGRAM(S): at 21:11

## 2020-04-05 RX ADMIN — Medication 100 MICROGRAM(S): at 21:12

## 2020-04-05 RX ADMIN — CHLORHEXIDINE GLUCONATE 15 MILLILITER(S): 213 SOLUTION TOPICAL at 21:08

## 2020-04-05 RX ADMIN — Medication 1 DROP(S): at 17:35

## 2020-04-05 RX ADMIN — Medication 2: at 12:04

## 2020-04-05 RX ADMIN — Medication 4: at 17:35

## 2020-04-05 RX ADMIN — Medication 200 MILLIGRAM(S): at 12:04

## 2020-04-05 RX ADMIN — Medication 81 MILLIGRAM(S): at 17:35

## 2020-04-05 NOTE — PROGRESS NOTE ADULT - SUBJECTIVE AND OBJECTIVE BOX
HISTORY  65y Female with COVID-19(+) pneumonia requiring intubation.    24 HOUR EVENTS:    SUBJECTIVE/ROS: Due to intubation with sedation, subjective information was not able to be obtained from the patient. History was obtained, to the extent possible, from review of the chart and collateral sources of information.      NEURO  Exam: sedated, no acute distress  Meds: acetaminophen    Suspension .. 650 milliGRAM(s) Enteral Tube every 6 hours PRN Temp greater or equal to 38C (100.4F)  ketamine Infusion. 1 mG/kG/Hr IV Continuous <Continuous>  propofol Infusion 35 MICROgram(s)/kG/Min IV Continuous <Continuous>    Adequacy of sedation and pain control has been assessed and adjusted.      RESPIRATORY  RR: 14 (11 - 32)  SpO2: 100% (93% - 100%)  Mechanical Ventilation: Mode: AC/ CMV (Assist Control/ Continuous Mandatory Ventilation), RR (machine): 32, TV (machine): 320, FiO2: 30, PEEP: 12  Predicted Body Weight: 45.5  Tidal Volume/PBW ratio: 7.03  PaO2 to FiO2 ratio: 286.6  Extubation readiness assessed.  ABG - ( 05 Apr 2020 01:21 )  pH: 7.24  /  pCO2: 41    /  pO2: 148   / HCO3: 17    / Base Excess: -9.3  /  SaO2: 99      Lactate: x                CARDIOVASCULAR  HR: 69 (66 - 73)  ABP: 139/56 (128/42 - 156/66)  ABP(mean): 86 (67 - 97)  CVP: 10  Cardiac Rhythm: sinus 70 bpm  Most recent QTc: 473 (4/4)  Meds:         GI/NUTRITION  Diet: Diet, NPO with Tube Feed:   Tube Feeding Modality: Orogastric  Pivot 1.5 Ilya (PIVOT1.5RTH)  Total Volume for 24 Hours (mL): 480  Continuous  Starting Tube Feed Rate mL per Hour: 20  Until Goal Tube Feed Rate (mL per Hour): 20  Tube Feed Duration (in Hours): 24  Tube Feed Start Time: 10:00 (04-04-20 @ 09:39)    Most recent bowel movement:  Meds/stress ulcer prophylaxis:       GENITOURINARY  I&O's Detail    04-03 @ 07:01  -  04-04 @ 07:00  --------------------------------------------------------  IN:    ketamine Infusion: 130 mL    norepinephrine Infusion: 90 mL    propofol Infusion: 300 mL  Total IN: 520 mL    OUT:    Indwelling Catheter - Urethral: 925 mL  Total OUT: 925 mL    Total NET: -405 mL      04-04 @ 07:01  - 04-05 @ 02:43  --------------------------------------------------------  IN:    ketamine Infusion: 130 mL    ketamine Infusion: 10 mL    ketamine Infusion.: 20 mL    norepinephrine Infusion: 5.4 mL    NSModularFluidAdult: 60 mL    propofol Infusion: 228 mL    propofol Infusion: 126 mL  Total IN: 579.4 mL    OUT:    Indwelling Catheter - Urethral: 75 mL  Total OUT: 75 mL    Total NET: 504.4 mL          04-04    136  |  105  |  67<H>  ----------------------------<  125<H>  4.4   |  15<L>  |  4.89<H>    Ca    8.1<L>      04 Apr 2020 14:21  Phos  7.0     04-04  Mg     2.1     04-04    TPro  6.0  /  Alb  2.4<L>  /  TBili  0.1<L>  /  DBili  x   /  AST  16  /  ALT  10  /  AlkPhos  70  04-04    [x] Glass catheter, indication: urine output monitoring in critically ill patient.  Meds:   Creatine Kinase, Serum: 48 (04-04-20 @ 03:17)        HEMATOLOGIC  Meds: aspirin  chewable 81 milliGRAM(s) Enteral Tube daily  enoxaparin Injectable 30 milliGRAM(s) SubCutaneous daily    VTE Prophylaxis:                         7.7    6.22  )-----------( 199      ( 05 Apr 2020 01:32 )             24.6     PT/INR - ( 04 Apr 2020 03:17 )   PT: 12.8 sec;   INR: 1.12 ratio         PTT - ( 03 Apr 2020 18:27 )  PTT:43.3 sec  19504-03-20 @ 22:28        INFECTIOUS DISEASES  T(C): 36.3, Max: 36.6 (04-04-20 @ 07:00)  WBC Count: 6.22 (04-05-20 @ 01:32)  WBC Count: 7.94 (04-04-20 @ 03:17)    Recent Cultures:  Specimen Source: .Blood Blood-Peripheral, 04-03 @ 17:12; Results   No growth to date.; Gram Stain: --; Organism: --  Specimen Source: .Blood Blood-Peripheral, 04-03 @ 17:11; Results   No growth to date.; Gram Stain: --; Organism: --    Meds: hydroxychloroquine   Oral   hydroxychloroquine 200 milliGRAM(s) Oral every 12 hours    Procalcitonin, Serum: 0.80 ng/mL (04-04-20 @ 03:17)        ENDOCRINE  162  138    Meds: atorvastatin 20 milliGRAM(s) Oral at bedtime  insulin lispro (HumaLOG) corrective regimen sliding scale   SubCutaneous every 6 hours  levothyroxine Injectable 100 MICROGram(s) IV Push at bedtime        ACCESS DEVICES:  [ ] Peripheral IV  [x] Central Venous Line	[ ] R	[ ] L	[ ] IJ	[ ] Fem	[ ] SC	Placed:   [x] Arterial Line		[ ] R	[ ] L	[ ] Fem	[ ] Rad	[ ] Ax	Placed:   [ ] [facility] HD Access             [ ] R [ ] L [ ] IJ  [ ] Fem     Placed:  [x] Urinary Catheter, Date Placed:   Necessity of urinary, arterial, and venous catheters discussed.      CODE STATUS:     IMAGING:

## 2020-04-05 NOTE — PROGRESS NOTE ADULT - ASSESSMENT
HPI:  64 yo female withHTN, DM, HLD,   presenting with SOB for one week. per patient's , patient had one week of SOB, fevers. Was saturating 70% at home, where patient used family member's oxygen, bringing up to 80-90%. today was 60% on RA so came to ED for further evaluation.     COVID +  Admitted 4/3/2020  Intubated 4/3/2020  Plaquenil started 4/3/2020 HPI:  66 yo female withHTN, DM, HLD,   presenting with SOB for one week. per patient's , patient had one week of SOB, fevers. Was saturating 70% at home, where patient used family member's oxygen, bringing up to 80-90%. today was 60% on RA so came to ED for further evaluation.     Neuro   - propofol @ 45 mcg/kg/min  - Ketamine @ 1mg    -precedex 0.5 mcg/ kg/ hr     CV  - CVP 7     Pulmonary  - AC/VC 32/320/30/8  - plateau 22  - ABG (4/5)  7.24/ 41/ 148/ 17/ -8.6/ 99%  -- TV/PBW =  8.2  - P/F = 493     GI  - NPO, OGT   - PIVOT @ 20cc/hr  - miralax and senna   - Nutrition consult ordered     [facility] cath  - BUN/CREAT   71/5.24     Heme   - Lovenox 30 mg SQ daily  - H/H  7.7/24.6     ID  - plaquenil 400 mg Q12 x2 (4/3)  - ceftriaxone 1000 mg IV x1 (4/3)  - Zithromax 500 mg IV x1 (4/3)    Lines   - L  IJ  triple lumen  -  L radial A-line

## 2020-04-05 NOTE — PROGRESS NOTE ADULT - ATTENDING COMMENTS
Propofol  at 45 microgram per kilogram per minute.  Ketamine 1 mg / kg / hour  Will start Precedex hoping to decrease propofol and ketamine  PEEP of 10 and 30 percent.  Will look to decrease PEEP if saturation acceptable    requires high respiratory rate to sustain ventilation  ARDS net high PEEP protocol  only 600 cc positive.  CVP is 8  creatinine elevated consistent with MARIO secondary to ATN.  Likely euvolemic  Blood Gas Arterial, Lactate: 0.8 mmol/L (04.05.20 @ 14:16)  40 minutes of critical care management applied

## 2020-04-06 DIAGNOSIS — N17.9 ACUTE KIDNEY FAILURE, UNSPECIFIED: ICD-10-CM

## 2020-04-06 DIAGNOSIS — E83.39 OTHER DISORDERS OF PHOSPHORUS METABOLISM: ICD-10-CM

## 2020-04-06 DIAGNOSIS — E83.51 HYPOCALCEMIA: ICD-10-CM

## 2020-04-06 DIAGNOSIS — E87.2 ACIDOSIS: ICD-10-CM

## 2020-04-06 LAB
ALBUMIN SERPL ELPH-MCNC: 2 G/DL — LOW (ref 3.3–5)
ALP SERPL-CCNC: 443 U/L — HIGH (ref 40–120)
ALT FLD-CCNC: 43 U/L — SIGNIFICANT CHANGE UP (ref 10–45)
ANION GAP SERPL CALC-SCNC: 16 MMOL/L — SIGNIFICANT CHANGE UP (ref 5–17)
ANION GAP SERPL CALC-SCNC: 18 MMOL/L — HIGH (ref 5–17)
APTT BLD: 57.4 SEC — HIGH (ref 27.5–36.3)
AST SERPL-CCNC: 77 U/L — HIGH (ref 10–40)
BASE EXCESS BLDA CALC-SCNC: -9.6 MMOL/L — LOW (ref -2–2)
BILIRUB SERPL-MCNC: 0.2 MG/DL — SIGNIFICANT CHANGE UP (ref 0.2–1.2)
BUN SERPL-MCNC: 76 MG/DL — HIGH (ref 7–23)
BUN SERPL-MCNC: 81 MG/DL — HIGH (ref 7–23)
CALCIUM SERPL-MCNC: 7.4 MG/DL — LOW (ref 8.4–10.5)
CALCIUM SERPL-MCNC: 7.4 MG/DL — LOW (ref 8.4–10.5)
CHLORIDE SERPL-SCNC: 102 MMOL/L — SIGNIFICANT CHANGE UP (ref 96–108)
CHLORIDE SERPL-SCNC: 102 MMOL/L — SIGNIFICANT CHANGE UP (ref 96–108)
CO2 BLDA-SCNC: 17 MMOL/L — LOW (ref 22–30)
CO2 SERPL-SCNC: 14 MMOL/L — LOW (ref 22–31)
CO2 SERPL-SCNC: 15 MMOL/L — LOW (ref 22–31)
CREAT SERPL-MCNC: 5.8 MG/DL — HIGH (ref 0.5–1.3)
CREAT SERPL-MCNC: 5.88 MG/DL — HIGH (ref 0.5–1.3)
G6PD RBC-CCNC: 16.5 U/G HGB — SIGNIFICANT CHANGE UP (ref 7–20.5)
GAS PNL BLDA: SIGNIFICANT CHANGE UP
GAS PNL BLDA: SIGNIFICANT CHANGE UP
GLUCOSE BLDC GLUCOMTR-MCNC: 141 MG/DL — HIGH (ref 70–99)
GLUCOSE BLDC GLUCOMTR-MCNC: 177 MG/DL — HIGH (ref 70–99)
GLUCOSE BLDC GLUCOMTR-MCNC: 186 MG/DL — HIGH (ref 70–99)
GLUCOSE BLDC GLUCOMTR-MCNC: 206 MG/DL — HIGH (ref 70–99)
GLUCOSE SERPL-MCNC: 138 MG/DL — HIGH (ref 70–99)
GLUCOSE SERPL-MCNC: 205 MG/DL — HIGH (ref 70–99)
HCO3 BLDA-SCNC: 16 MMOL/L — LOW (ref 21–29)
HCT VFR BLD CALC: 24.5 % — LOW (ref 34.5–45)
HGB BLD-MCNC: 7.6 G/DL — LOW (ref 11.5–15.5)
HOROWITZ INDEX BLDA+IHG-RTO: 30 — SIGNIFICANT CHANGE UP
INR BLD: 1.24 RATIO — HIGH (ref 0.88–1.16)
MAGNESIUM SERPL-MCNC: 2.1 MG/DL — SIGNIFICANT CHANGE UP (ref 1.6–2.6)
MAGNESIUM SERPL-MCNC: 2.2 MG/DL — SIGNIFICANT CHANGE UP (ref 1.6–2.6)
MCHC RBC-ENTMCNC: 25.3 PG — LOW (ref 27–34)
MCHC RBC-ENTMCNC: 31 GM/DL — LOW (ref 32–36)
MCV RBC AUTO: 81.7 FL — SIGNIFICANT CHANGE UP (ref 80–100)
NRBC # BLD: 0 /100 WBCS — SIGNIFICANT CHANGE UP (ref 0–0)
PCO2 BLDA: 36 MMHG — SIGNIFICANT CHANGE UP (ref 32–46)
PH BLDA: 7.27 — LOW (ref 7.35–7.45)
PHOSPHATE SERPL-MCNC: 6.8 MG/DL — HIGH (ref 2.5–4.5)
PHOSPHATE SERPL-MCNC: 7.3 MG/DL — HIGH (ref 2.5–4.5)
PLATELET # BLD AUTO: 210 K/UL — SIGNIFICANT CHANGE UP (ref 150–400)
PO2 BLDA: 147 MMHG — HIGH (ref 74–108)
POTASSIUM SERPL-MCNC: 4.5 MMOL/L — SIGNIFICANT CHANGE UP (ref 3.5–5.3)
POTASSIUM SERPL-MCNC: 4.6 MMOL/L — SIGNIFICANT CHANGE UP (ref 3.5–5.3)
POTASSIUM SERPL-SCNC: 4.5 MMOL/L — SIGNIFICANT CHANGE UP (ref 3.5–5.3)
POTASSIUM SERPL-SCNC: 4.6 MMOL/L — SIGNIFICANT CHANGE UP (ref 3.5–5.3)
PROCALCITONIN SERPL-MCNC: 0.82 NG/ML — HIGH (ref 0.02–0.1)
PROT SERPL-MCNC: 5.6 G/DL — LOW (ref 6–8.3)
PROTHROM AB SERPL-ACNC: 14.2 SEC — HIGH (ref 10–12.9)
RBC # BLD: 3 M/UL — LOW (ref 3.8–5.2)
RBC # FLD: 13.4 % — SIGNIFICANT CHANGE UP (ref 10.3–14.5)
SAO2 % BLDA: 99 % — HIGH (ref 92–96)
SODIUM SERPL-SCNC: 133 MMOL/L — LOW (ref 135–145)
SODIUM SERPL-SCNC: 134 MMOL/L — LOW (ref 135–145)
WBC # BLD: 6.41 K/UL — SIGNIFICANT CHANGE UP (ref 3.8–10.5)
WBC # FLD AUTO: 6.41 K/UL — SIGNIFICANT CHANGE UP (ref 3.8–10.5)

## 2020-04-06 PROCEDURE — 99223 1ST HOSP IP/OBS HIGH 75: CPT | Mod: GC

## 2020-04-06 RX ORDER — FUROSEMIDE 40 MG
10 TABLET ORAL
Qty: 500 | Refills: 0 | Status: DISCONTINUED | OUTPATIENT
Start: 2020-04-06 | End: 2020-04-07

## 2020-04-06 RX ORDER — METOPROLOL TARTRATE 50 MG
5 TABLET ORAL EVERY 6 HOURS
Refills: 0 | Status: DISCONTINUED | OUTPATIENT
Start: 2020-04-06 | End: 2020-04-10

## 2020-04-06 RX ORDER — SODIUM BICARBONATE 1 MEQ/ML
1300 SYRINGE (ML) INTRAVENOUS
Refills: 0 | Status: DISCONTINUED | OUTPATIENT
Start: 2020-04-06 | End: 2020-04-08

## 2020-04-06 RX ORDER — LABETALOL HCL 100 MG
10 TABLET ORAL ONCE
Refills: 0 | Status: COMPLETED | OUTPATIENT
Start: 2020-04-06 | End: 2020-04-06

## 2020-04-06 RX ORDER — FUROSEMIDE 40 MG
40 TABLET ORAL ONCE
Refills: 0 | Status: COMPLETED | OUTPATIENT
Start: 2020-04-06 | End: 2020-04-06

## 2020-04-06 RX ORDER — SEVELAMER CARBONATE 2400 MG/1
800 POWDER, FOR SUSPENSION ORAL EVERY 8 HOURS
Refills: 0 | Status: DISCONTINUED | OUTPATIENT
Start: 2020-04-06 | End: 2020-04-07

## 2020-04-06 RX ORDER — LACTULOSE 10 G/15ML
20 SOLUTION ORAL EVERY 6 HOURS
Refills: 0 | Status: DISCONTINUED | OUTPATIENT
Start: 2020-04-06 | End: 2020-04-08

## 2020-04-06 RX ORDER — FAMOTIDINE 10 MG/ML
20 INJECTION INTRAVENOUS DAILY
Refills: 0 | Status: DISCONTINUED | OUTPATIENT
Start: 2020-04-06 | End: 2020-04-23

## 2020-04-06 RX ORDER — HYDRALAZINE HCL 50 MG
10 TABLET ORAL EVERY 6 HOURS
Refills: 0 | Status: DISCONTINUED | OUTPATIENT
Start: 2020-04-06 | End: 2020-04-10

## 2020-04-06 RX ORDER — HYDRALAZINE HCL 50 MG
10 TABLET ORAL ONCE
Refills: 0 | Status: COMPLETED | OUTPATIENT
Start: 2020-04-06 | End: 2020-04-06

## 2020-04-06 RX ADMIN — Medication 1 DROP(S): at 17:04

## 2020-04-06 RX ADMIN — LACTULOSE 20 GRAM(S): 10 SOLUTION ORAL at 17:05

## 2020-04-06 RX ADMIN — Medication 10 MILLIGRAM(S): at 18:39

## 2020-04-06 RX ADMIN — Medication 10 MILLIGRAM(S): at 18:55

## 2020-04-06 RX ADMIN — ATORVASTATIN CALCIUM 20 MILLIGRAM(S): 80 TABLET, FILM COATED ORAL at 22:57

## 2020-04-06 RX ADMIN — Medication 100 MICROGRAM(S): at 22:57

## 2020-04-06 RX ADMIN — Medication 10 MILLIGRAM(S): at 17:51

## 2020-04-06 RX ADMIN — Medication 40 MILLIGRAM(S): at 13:33

## 2020-04-06 RX ADMIN — Medication 4: at 12:24

## 2020-04-06 RX ADMIN — Medication 81 MILLIGRAM(S): at 12:02

## 2020-04-06 RX ADMIN — Medication 2: at 06:23

## 2020-04-06 RX ADMIN — Medication 2: at 18:06

## 2020-04-06 RX ADMIN — POLYETHYLENE GLYCOL 3350 17 GRAM(S): 17 POWDER, FOR SOLUTION ORAL at 12:41

## 2020-04-06 RX ADMIN — CHLORHEXIDINE GLUCONATE 15 MILLILITER(S): 213 SOLUTION TOPICAL at 06:22

## 2020-04-06 RX ADMIN — Medication 1300 MILLIGRAM(S): at 17:05

## 2020-04-06 RX ADMIN — ENOXAPARIN SODIUM 30 MILLIGRAM(S): 100 INJECTION SUBCUTANEOUS at 12:02

## 2020-04-06 RX ADMIN — SEVELAMER CARBONATE 800 MILLIGRAM(S): 2400 POWDER, FOR SUSPENSION ORAL at 22:57

## 2020-04-06 RX ADMIN — CHLORHEXIDINE GLUCONATE 15 MILLILITER(S): 213 SOLUTION TOPICAL at 17:05

## 2020-04-06 RX ADMIN — Medication 200 MILLIGRAM(S): at 12:02

## 2020-04-06 RX ADMIN — Medication 1 DROP(S): at 06:21

## 2020-04-06 NOTE — PROGRESS NOTE ADULT - SUBJECTIVE AND OBJECTIVE BOX
HISTORY  65y Female with COVID-19(+) pneumonia requiring intubation.    24 HOUR EVENTS: No acute events    SUBJECTIVE/ROS: Due to intubation with sedation, subjective information was not able to be obtained from the patient. History was obtained, to the extent possible, from review of the chart and collateral sources of information.      NEURO  Exam: sedated, no acute distress  Meds: acetaminophen    Suspension .. 650 milliGRAM(s) Enteral Tube every 6 hours PRN Temp greater or equal to 38C (100.4F)  dexMEDEtomidine Infusion 0.5 MICROgram(s)/kG/Hr IV Continuous <Continuous>  ketamine Infusion. 1 mG/kG/Hr IV Continuous <Continuous>  midazolam Injectable 2 milliGRAM(s) IV Push once  propofol Infusion 45 MICROgram(s)/kG/Min IV Continuous <Continuous>  rocuronium Injectable 50 milliGRAM(s) IV Push once    Adequacy of sedation and pain control has been assessed and adjusted.      RESPIRATORY  RR: 32 (14 - 32)  SpO2: 96% (96% - 100%)  Exam: without dissynchrony  Mechanical Ventilation: Mode: AC/ CMV (Assist Control/ Continuous Mandatory Ventilation), RR (machine): 32, TV (machine): 320, FiO2: 30, PEEP: 8  Predicted Body Weight: 38.6  Tidal Volume/PBW ratio: 8.2  PaO2 to FiO2 ratio: 417  Extubation readiness assessed.  ABG - ( 06 Apr 2020 00:12 )  pH: 7.27  /  pCO2: 37    /  pO2: 125   / HCO3: 16    / Base Excess: -9.1  /  SaO2: 99      Lactate: 0.5                CARDIOVASCULAR  HR: 67 (62 - 72)  ABP: 148/59 (120/50 - 148/59)  ABP(mean): 93 (74 - 93)  Cardiac Rhythm: sinus  Most recent QTc:   Meds: none        GI/NUTRITION  Diet: Diet, NPO with Tube Feed: Jevity 1.5 @ 30 ml/hr  Tube Feeding Modality: Orogastric  Total Volume for 24 Hours (mL): 480  Continuous  Starting Tube Feed Rate mL per Hour: 30  Until Goal Tube Feed Rate (mL per Hour): 30  Tube Feed Duration (in Hours): 24  Tube Feed Start Time: 10:00 (04-04-20 @ 09:39)    Most recent bowel movement:  Meds/stress ulcer prophylaxis: polyethylene glycol 3350 17 Gram(s) Oral daily  senna 2 Tablet(s) Oral at bedtime        GENITOURINARY  I&O's Detail    04-04 @ 07:01 - 04-05 @ 07:00  --------------------------------------------------------  IN:    ketamine Infusion: 130 mL    ketamine Infusion: 10 mL    ketamine Infusion.: 70 mL    norepinephrine Infusion: 5.4 mL    NSModularFluidAdult: 60 mL    propofol Infusion: 363 mL    propofol Infusion: 126 mL  Total IN: 764.4 mL    OUT:    Indwelling Catheter - Urethral: 95 mL  Total OUT: 95 mL    Total NET: 669.4 mL      04-05 @ 07:01 - 04-06 @ 00:34  --------------------------------------------------------  IN:    dexmedetomidine Infusion: 134.8 mL    ketamine Infusion.: 110 mL    ns in tub fed  [id number]: 300 mL    propofol Infusion: 158 mL  Total IN: 702.8 mL    OUT:    Indwelling Catheter - Urethral: 35 mL  Total OUT: 35 mL    Total NET: 667.8 mL          04-05    133<L>  |  102  |  73<H>  ----------------------------<  173<H>  4.6   |  14<L>  |  5.52<H>    Ca    7.5<L>      05 Apr 2020 13:57  Phos  6.8     04-05  Mg     2.1     04-05    TPro  5.7<L>  /  Alb  2.1<L>  /  TBili  0.1<L>  /  DBili  <0.1  /  AST  13  /  ALT  9<L>  /  AlkPhos  73  04-05    [x] Glass catheter, indication: urine output monitoring in critically ill patient.  Meds:   Creatine Kinase, Serum: 48 (04-04-20 @ 03:17)        HEMATOLOGIC  Meds/VTE prophylaxis: aspirin  chewable 81 milliGRAM(s) Enteral Tube daily  enoxaparin Injectable 30 milliGRAM(s) SubCutaneous daily                          7.7    6.22  )-----------( 199      ( 05 Apr 2020 01:32 )             24.6     PT/INR - ( 04 Apr 2020 03:17 )   PT: 12.8 sec;   INR: 1.12 ratio           19504-03-20 @ 22:28        INFECTIOUS DISEASES  T(C): 36.9, Max: 36.9 (04-05-20 @ 20:00)  WBC Count: 6.22 (04-05-20 @ 01:32)  WBC Count: 7.94 (04-04-20 @ 03:17)    Recent Cultures:  Specimen Source: .Blood Blood-Peripheral, 04-03 @ 17:12; Results   No growth to date.; Gram Stain: --; Organism: --  Specimen Source: .Blood Blood-Peripheral, 04-03 @ 17:11; Results   No growth to date.; Gram Stain: --; Organism: --    Meds: hydroxychloroquine   Oral   hydroxychloroquine 200 milliGRAM(s) Oral every 12 hours    Procalcitonin, Serum: 0.82 ng/mL (04-05-20 @ 01:32)        ENDOCRINE  Fingersticks: 140, 202, 175, 127, 162  Meds: atorvastatin 20 milliGRAM(s) Oral at bedtime  insulin lispro (HumaLOG) corrective regimen sliding scale   SubCutaneous every 6 hours  levothyroxine Injectable 100 MICROGram(s) IV Push at bedtime        ACCESS DEVICES:  [ ] Peripheral IV  [x] Central Venous Line	[ ] R	[ ] L	[ ] IJ	[ ] Fem	[ ] SC	Placed:   [x] Arterial Line		[ ] R	[ ] L	[ ] Fem	[ ] Rad	[ ] Ax	Placed:   [ ] [facility] HD Access             [ ] R [ ] L [ ] IJ  [ ] Fem     Placed:  [x] Urinary Catheter, Date Placed:   Necessity of urinary, arterial, and venous catheters discussed.      CODE STATUS:     IMAGING:

## 2020-04-06 NOTE — PROGRESS NOTE ADULT - ATTENDING COMMENTS
Pt seen and discussed with ICU staff, agree with above.    Neuro: propofol 45/ ketamine 1/ precedex 0.5  - Withdraws to pain but does not follow commands  - Wean sedation as long as does not cause vent dyssynchrony    Pulm: 320/ 32/8/30%  P/F 416; Pplat 16  - Wean to 5/30% today (maintain O2 sat 90-96%)  - ARDS improving    CV: off pressors    GI: tolerating tube feeds  - Change to Suplena with CS given MARIO stage 3 with high BUN not on CRRT (prefer low-protein formula)  - Pepcid  - D/c miralax, start lactulose as pt has not had BM    : oliguric stage 3 MARIO  - Try lasix 40mg today for diuresis  - Start phosphate binder  - Start po bicarb for metabolic acidosis (prefer not to give additional volume with IV bicarb)    ID: change Plaquenil dosing per current guideline (400 daily)

## 2020-04-06 NOTE — PROGRESS NOTE ADULT - ASSESSMENT
HPI:  64 yo female withHTN, DM, HLD,   presenting with SOB for one week. per patient's , patient had one week of SOB, fevers. Was saturating 70% at home, where patient used family member's oxygen, bringing up to 80-90%. today was 60% on RA so came to ED for further evaluation.     Neuro   - propofol @ 30 mcg/kg/min  - Ketamine @ 1mg    -precedex 1 mcg/ kg/ hr     CV  - CVP 7     Pulmonary  - AC/VC 32/320/30/8  - plateau 22  - ABG (4/6)  7.27/ 37/ 125/ 16/ -9.1/ 99%  -- TV/PBW =  8.2  - P/F = 493     GI  - NPO, OGT   - Jevity 1.5 @ 30cc/hr  - miralax and senna   - Nutrition consult ordered     [facility] cath  - BUN/CREAT   71/5.24     Heme   - Lovenox 30 mg SQ daily  - H/H  7.7/24.6     ID  - plaquenil 400 mg Q12 x2 (4/3)  - ceftriaxone 1000 mg IV x1 (4/3)  - Zithromax 500 mg IV x1 (4/3)    Lines   - L  IJ  triple lumen  -  L radial A-line HPI:  64 yo female withHTN, DM, HLD,   presenting with SOB for one week. per patient's , patient had one week of SOB, fevers. Was saturating 70% at home, where patient used family member's oxygen, bringing up to 80-90%. today was 60% on RA so came to ED for further evaluation.     Neuro   - propofol @ 30 mcg/kg/min  - Ketamine @ 1mg    -precedex 1 mcg/ kg/ hr     CV  - CVP 7   - Lasix gtt, Metolazone 10 mg po/NGT daily per renal    Pulmonary  - AC/VC 32/320/30/8  - plateau 22  - ABG (4/6)  7.27/ 37/ 125/ 16/ -9.1/ 99%  -- TV/PBW =  8.2  - P/F = 493     GI  - NPO, OGT   - Jevity 1.5 @ 30cc/hr  - miralax and senna   - Nutrition consult ordered       - Glass cath  - BUN/CREAT   71/5.24     Heme   - Lovenox 30 mg SQ daily  - H/H  7.7/24.6     ID  - plaquenil 400 mg Q12 x2 (4/3)  - ceftriaxone 1000 mg IV x1 (4/3)  - Zithromax 500 mg IV x1 (4/3)    Lines   - L  IJ  triple lumen  -  L radial A-line

## 2020-04-06 NOTE — CHART NOTE - NSCHARTNOTEFT_GEN_A_CORE
Upon Nutritional Assessment by the Registered Dietitian your patient was determined to meet criteria / has evidence of the following diagnosis/diagnoses:          [ ]  Mild Protein Calorie Malnutrition        [ ]  Moderate Protein Calorie Malnutrition        [ ] Severe Protein Calorie Malnutrition        [ ] Unspecified Protein Calorie Malnutrition        [ ] Underweight / BMI <19        [x] Morbid Obesity / BMI > 40      Findings as based on:  [x] Comprehensive nutrition assessment   [ ] Nutrition Focused Physical Exam  [x] Other: BMI 43.1 kG/2       Nutrition Plan/Recommendations:  1) Suplena @20mL/hr x24hr in the setting of worsening renal function, no dialysis. Gradual increase to goal rate: 30mL/hr x24hr to provide 720mL total volume, 1296 kcal (13 kcal/kg dosing wt), 32g protein (0.7 g/kg IBW) 2) Will need to adjust EN based on renal function and initiation of dialysis 3) Continue Renvela as ordered 4) RD remains available upon request to adjust EN formula/rate as clinically indicated        PROVIDER Section:     By signing this assessment you are acknowledging and agree with the diagnosis/diagnoses assigned by the Registered Dietitian    Comments:

## 2020-04-06 NOTE — DIETITIAN INITIAL EVALUATION ADULT. - PERTINENT MEDS FT
hydroxychloroquine, insulin sliding scale, pepcid, lasix, lactulose, renvela, sodium bicarbonate, zemuron, ketamine, precedex, levophed, propofol

## 2020-04-06 NOTE — CONSULT NOTE ADULT - SUBJECTIVE AND OBJECTIVE BOX
[facility] DIVISION OF KIDNEY DISEASES AND HYPERTENSION -- INITIAL CONSULT NOTE  Darron Cutler  Nephrology Fellow  NS Pager: [phone]  [facility] Pager: [phone]  After 5pm or on weekend, please page the on-call fellow)  --------------------------------------------------------------------------------  HPI: Hx obtained from EMR/primary team    65F PMHx DM2, HTN, HLD who present to ED for fever/dyspnea x 1 week.  Per HPI, pt desaturated at home O2 sat 70% which improved nasal cannula (another family member).  In ED hypoxia 70s on nasal cannula s/p intubation.  Covid positive with CXR b/l opacities. Given ceftriaxone, azithromycin, hydroxychlorquine.  Current vent setting vent FIO2 30, PEEP 5.    Nephrology consulted for MARIO.  On admission sCr 3.93 on 4/3/2020, peaked to 5.88 on 4/6 hence nephrology consulted.  Lab noted vBG pH 7.13/CO2 49/HCO3 20. Still making urine.  no urine study or kidney imaging.  Given lasix 40 IV on 4/6 given decrease UOP.        PAST HISTORY  --------------------------------------------------------------------------------  PAST MEDICAL & SURGICAL HISTORY:  Type 2 diabetes mellitus  Hyperlipidemia  HTN (hypertension)    FAMILY HISTORY:    PAST SOCIAL HISTORY:    ALLERGIES & MEDICATIONS  --------------------------------------------------------------------------------  Allergies    No Known Allergies    Intolerances      Standing Inpatient Medications  artificial  tears Solution 1 Drop(s) Both EYES two times a day  aspirin  chewable 81 milliGRAM(s) Enteral Tube daily  atorvastatin 20 milliGRAM(s) Oral at bedtime  chlorhexidine 0.12% Liquid 15 milliLiter(s) Oral Mucosa every 12 hours  dexMEDEtomidine Infusion 0.5 MICROgram(s)/kG/Hr IV Continuous <Continuous>  enoxaparin Injectable 30 milliGRAM(s) SubCutaneous daily  famotidine Injectable 20 milliGRAM(s) IV Push daily  insulin lispro (HumaLOG) corrective regimen sliding scale   SubCutaneous every 6 hours  ketamine Infusion. 1 mG/kG/Hr IV Continuous <Continuous>  lactulose Syrup 20 Gram(s) Oral every 6 hours  levothyroxine Injectable 100 MICROGram(s) IV Push at bedtime  norepinephrine Infusion 0.01 MICROgram(s)/kG/Min IV Continuous <Continuous>  propofol Infusion 45 MICROgram(s)/kG/Min IV Continuous <Continuous>  rocuronium Injectable 50 milliGRAM(s) IV Push once  senna 2 Tablet(s) Oral at bedtime  sevelamer carbonate 800 milliGRAM(s) Oral every 8 hours  sodium bicarbonate 1300 milliGRAM(s) Oral two times a day    PRN Inpatient Medications  acetaminophen    Suspension .. 650 milliGRAM(s) Enteral Tube every 6 hours PRN      REVIEW OF SYSTEMS  Deferred due to COVID-19 rule out/positivity and preservation of PPE.    VITALS/PHYSICAL EXAM  --------------------------------------------------------------------------------  T(C): 35.2 (04-06-20 @ 13:00), Max: 37 (04-06-20 @ 00:00)  HR: 62 (04-06-20 @ 13:00) (61 - 70)  BP: --  RR: 32 (04-06-20 @ 06:00) (24 - 32)  SpO2: 100% (04-06-20 @ 13:00) (95% - 100%)  Wt(kg): --        04-05-20 @ 07:01  -  04-06-20 @ 07:00  --------------------------------------------------------  IN: 1185.6 mL / OUT: 200 mL / NET: 985.6 mL    04-06-20 @ 07:01  -  04-06-20 @ 14:34  --------------------------------------------------------  IN: 332 mL / OUT: 75 mL / NET: 257 mL      Physical Exam:  Deferred due to COVID-19 rule out/positivity and preservation of PPE.    LABS/STUDIES  --------------------------------------------------------------------------------              7.6    6.41  >-----------<  210      [04-06-20 @ 00:34]              24.5     133  |  102  |  81  ----------------------------<  205      [04-06-20 @ 11:39]  4.5   |  15  |  5.80        Ca     7.4     [04-06-20 @ 11:39]      Mg     2.1     [04-06-20 @ 11:39]      Phos  7.3     [04-06-20 @ 11:39]    TPro  5.6  /  Alb  2.0  /  TBili  0.2  /  DBili  x   /  AST  77  /  ALT  43  /  AlkPhos  443  [04-06-20 @ 00:34]    PT/INR: PT 14.2 , INR 1.24       [04-06-20 @ 00:34]  PTT: 57.4       [04-06-20 @ 00:34]      Creatinine Trend:  SCr 5.80 [04-06 @ 11:39]  SCr 5.88 [04-06 @ 00:34]  SCr 5.52 [04-05 @ 13:57]  SCr 5.24 [04-05 @ 01:32]  SCr 4.89 [04-04 @ 14:21]        Ferritin 195      [04-03-20 @ 22:28]

## 2020-04-06 NOTE — DIETITIAN INITIAL EVALUATION ADULT. - ENERGY NEEDS
ENERGY NEEDS: 4230-0849 kcal/d (11-14 kcal/kg)    ht: 5'0", wt: 220 pounds, BMI: 43.1kg/m2, IBW: 100 pounds +/- 10%

## 2020-04-06 NOTE — DIETITIAN INITIAL EVALUATION ADULT. - PHYSICAL APPEARANCE
Unable to visually assess pt or conduct nutrition focused physical exam due to current airborne/isolation precautions related to COVID-19.

## 2020-04-06 NOTE — CONSULT NOTE ADULT - ASSESSMENT
65F PMHx DM2, HTN, HLD who present to ED for fever/dyspnea x 1 week.  Per HPI, pt desaturated at home O2 sat 70% which improved nasal cannula (another family member).  In ED hypoxia 70s on nasal cannula s/p intubation.  Covid positive with CXR b/l opacities. Given ceftriaxone, azithromycin, hydroxychlorquine.     Nephrology consulted for MARIO or Acute CKD.       MARIO oliguria   likely 2/2 ATN in setting septic shock Covid19 infection, low BP  on admission sCr 3.93 on 4/3/2020, increased to 5.88 on 4/6/2020.  Exact duration of MARIO however unknown, no urine study yet    - Currently no absolute indication for hemodialysis, cont vasopressor support per primary team  - Recommend start diuretic bumex IV 2g push followed by bumex ggt 2mg/hr +/- metolazone 10mg daily  - Send UA, urine electrolytes, spot urine TP/CR. Agree with Glass catheter placement.  obtain renal U/S when feasible.  - Monitor labs and urine output. Avoid NSAIDs, ACEI/ARBS, RCA and nephrotoxins. Dose medications as per eGFR.      Metabolic acidosis   likely 2/2 MARIO ATN in setting septic shock covid19, today serum HCO3 15  consider starting sodium bicarb 130 PO BID  monitor daily 65F PMHx DM2, HTN, HLD who present to ED for fever/dyspnea x 1 week.  Per HPI, pt desaturated at home O2 sat 70% which improved nasal cannula (another family member).  In ED hypoxia 70s on nasal cannula s/p intubation.  Covid positive with CXR b/l opacities. Given ceftriaxone, azithromycin, hydroxychlorquine.     Nephrology consulted for MARIO or Acute CKD.       MARIO oliguria   likely 2/2 ATN in setting septic shock Covid19 infection, low BP  on admission sCr 3.93 on 4/3/2020, increased to 5.88 on 4/6/2020.  Exact duration of MARIO however unknown, no urine study yet  - Currently no absolute indication for hemodialysis, cont vasopressor support per primary team  - Recommend start diuretic bumex IV 2g push followed by bumex ggt 2mg/hr +/- metolazone 10mg daily  - Send UA, urine electrolytes, spot urine TP/CR. Agree with Glass catheter placement.  obtain renal U/S when feasible.  - Monitor labs and urine output. Avoid NSAIDs, ACEI/ARBS, RCA and nephrotoxins. Dose medications as per eGFR.  Renal/low potassium diet      Metabolic acidosis   likely 2/2 MARIO ATN in setting septic shock covid19, today serum HCO3 15  consider starting sodium bicarb 130 PO BID  monitor daily    Hyperphosphatemia  likely 2/2 MARIO ATN, today serum Phos 7.3, corrected Ca 9.0  Recommend starting Renvela 800 mg TID with meals.   Check intact PTH level. Low phosphorus diet advised. Monitor serum phosphorus    Hypocalcemia   likely 2/2 hypoalbuminemia, corrected calcium 9.0 mg/dl  - monitor 65F PMHx DM2, HTN, HLD who present to ED for fever/dyspnea x 1 week.  Per HPI, pt desaturated at home O2 sat 70% which improved nasal cannula (another family member).  In ED hypoxia 70s on nasal cannula s/p intubation.  Covid positive with CXR b/l opacities. Given ceftriaxone, azithromycin, hydroxychlorquine.     Nephrology consulted for MARIO or Acute CKD.       MARIO oliguria   likely 2/2 ATN in setting septic shock Covid19 infection, low BP  on admission sCr 3.93 on 4/3/2020, increased to 5.88 on 4/6/2020.  Exact duration of MARIO however unknown, no urine study yet  - Currently no absolute indication for hemodialysis, cont vasopressor support per primary team  - Recommend start lasix ggt 2mg/hr with metolazone 10mg daily PO  - Send UA, urine electrolytes, spot urine TP/CR. Agree with Glass catheter placement.  obtain renal U/S when feasible.  - Monitor labs and urine output. Avoid NSAIDs, ACEI/ARBS, RCA and nephrotoxins. Dose medications as per eGFR.  Renal/low potassium diet      Metabolic acidosis   likely 2/2 MARIO ATN in setting septic shock covid19, today serum HCO3 15  consider starting sodium bicarb 130 PO BID  monitor daily    Hyperphosphatemia  likely 2/2 MARIO ATN, today serum Phos 7.3, corrected Ca 9.0  Recommend starting Renvela 800 mg TID with meals.   Check intact PTH level. Low phosphorus diet advised. Monitor serum phosphorus    Hypocalcemia   likely 2/2 hypoalbuminemia, corrected calcium 9.0 mg/dl  - monitor 65F PMHx DM2, HTN, HLD who present to ED for fever/dyspnea x 1 week.  Per HPI, pt desaturated at home O2 sat 70% which improved nasal cannula (another family member).  In ED hypoxia 70s on nasal cannula s/p intubation.  Covid positive with CXR b/l opacities. Given ceftriaxone, azithromycin, hydroxychlorquine.     Nephrology consulted for MARIO or Acute CKD.       MARIO oliguria   likely 2/2 ATN in setting septic shock Covid19 infection, low BP  on admission sCr 3.93 on 4/3/2020, increased to 5.88 on 4/6/2020.  Exact duration of MARIO however unknown, no urine study yet  - Currently no absolute indication for hemodialysis, cont vasopressor support per primary team  - Recommend start lasix ggt 2mg/hr with metolazone 10mg daily PO  - Send UA, urine electrolytes, spot urine TP/CR. Agree with Glass catheter placement.  obtain renal U/S when feasible.  - Monitor labs and urine output. Avoid NSAIDs, ACEI/ARBS, RCA and nephrotoxins. Dose medications as per eGFR.  Renal/low potassium diet      Metabolic acidosis   likely 2/2 MARIO ATN in setting septic shock covid19, today serum HCO3 15  consider starting sodium bicarb 1300 PO BID  monitor daily    Hyperphosphatemia  in the setting of MARIO ATN, today serum Phos 7.3, corrected Ca 9.0  on Renvela 800 mg TID with meals.   Check intact PTH level. Low phosphorus diet advised. Monitor serum phosphorus    Hypocalcemia   in the setting of hyperphosphatemia and hypoalbuminemia, corrected calcium 9.0 mg/dl  - monitor ionized calcium, replete to keep >1.1

## 2020-04-06 NOTE — DIETITIAN INITIAL EVALUATION ADULT. - REASON INDICATOR FOR ASSESSMENT
Pt seen for ICU length of stay assessment.    Source: comprehensive chart review, RN, medical team  Unable to speak with pt due to current airborne/isolation precautions related to COVID-19.

## 2020-04-06 NOTE — DIETITIAN INITIAL EVALUATION ADULT. - OBTAIN CURRENT WEIGHT
yes Mohs Method Verbiage: An incision at a 45 degree angle following the standard Mohs approach was done and the specimen was harvested as a microscopic controlled layer.

## 2020-04-07 LAB
ANION GAP SERPL CALC-SCNC: 18 MMOL/L — HIGH (ref 5–17)
BASE EXCESS BLDA CALC-SCNC: -10.4 MMOL/L — LOW (ref -2–2)
BASE EXCESS BLDA CALC-SCNC: -8.9 MMOL/L — LOW (ref -2–2)
BASOPHILS # BLD AUTO: 0.02 K/UL — SIGNIFICANT CHANGE UP (ref 0–0.2)
BASOPHILS NFR BLD AUTO: 0.2 % — SIGNIFICANT CHANGE UP (ref 0–2)
BUN SERPL-MCNC: 87 MG/DL — HIGH (ref 7–23)
CALCIUM SERPL-MCNC: 7.1 MG/DL — LOW (ref 8.4–10.5)
CD3-/CD16+CD56+(%): 15 % — SIGNIFICANT CHANGE UP (ref 4–25)
CD3-CD16+CD56+(ABS): 47 CELLS/UL — LOW (ref 70–760)
CHLORIDE SERPL-SCNC: 100 MMOL/L — SIGNIFICANT CHANGE UP (ref 96–108)
CO2 BLDA-SCNC: 16 MMOL/L — LOW (ref 22–30)
CO2 BLDA-SCNC: 17 MMOL/L — LOW (ref 22–30)
CO2 SERPL-SCNC: 15 MMOL/L — LOW (ref 22–31)
CREAT SERPL-MCNC: 6.05 MG/DL — HIGH (ref 0.5–1.3)
CULTURE RESULTS: SIGNIFICANT CHANGE UP
CULTURE RESULTS: SIGNIFICANT CHANGE UP
EOSINOPHIL # BLD AUTO: 0.02 K/UL — SIGNIFICANT CHANGE UP (ref 0–0.5)
EOSINOPHIL NFR BLD AUTO: 0.2 % — SIGNIFICANT CHANGE UP (ref 0–6)
GAS PNL BLDA: SIGNIFICANT CHANGE UP
GAS PNL BLDA: SIGNIFICANT CHANGE UP
GLUCOSE BLDC GLUCOMTR-MCNC: 119 MG/DL — HIGH (ref 70–99)
GLUCOSE BLDC GLUCOMTR-MCNC: 129 MG/DL — HIGH (ref 70–99)
GLUCOSE BLDC GLUCOMTR-MCNC: 154 MG/DL — HIGH (ref 70–99)
GLUCOSE BLDC GLUCOMTR-MCNC: 160 MG/DL — HIGH (ref 70–99)
GLUCOSE SERPL-MCNC: 151 MG/DL — HIGH (ref 70–99)
HCO3 BLDA-SCNC: 15 MMOL/L — LOW (ref 21–29)
HCO3 BLDA-SCNC: 16 MMOL/L — LOW (ref 21–29)
HCT VFR BLD CALC: 23.7 % — LOW (ref 34.5–45)
HGB BLD-MCNC: 7.5 G/DL — LOW (ref 11.5–15.5)
HOROWITZ INDEX BLDA+IHG-RTO: 30 — SIGNIFICANT CHANGE UP
HOROWITZ INDEX BLDA+IHG-RTO: 30 — SIGNIFICANT CHANGE UP
IMM GRANULOCYTES NFR BLD AUTO: 1.8 % — HIGH (ref 0–1.5)
LYMPHOCYTES # BLD AUTO: 1.04 K/UL — SIGNIFICANT CHANGE UP (ref 1–3.3)
LYMPHOCYTES # BLD AUTO: 12.6 % — LOW (ref 13–44)
LYMPHOCYTES, ABSOLUTE: 321 CELLS/UL — LOW (ref 850–3900)
MAGNESIUM SERPL-MCNC: 2 MG/DL — SIGNIFICANT CHANGE UP (ref 1.6–2.6)
MCHC RBC-ENTMCNC: 25.3 PG — LOW (ref 27–34)
MCHC RBC-ENTMCNC: 31.6 GM/DL — LOW (ref 32–36)
MCV RBC AUTO: 79.8 FL — LOW (ref 80–100)
MONOCYTES # BLD AUTO: 0.9 K/UL — SIGNIFICANT CHANGE UP (ref 0–0.9)
MONOCYTES NFR BLD AUTO: 10.9 % — SIGNIFICANT CHANGE UP (ref 2–14)
NEUTROPHILS # BLD AUTO: 6.13 K/UL — SIGNIFICANT CHANGE UP (ref 1.8–7.4)
NEUTROPHILS NFR BLD AUTO: 74.3 % — SIGNIFICANT CHANGE UP (ref 43–77)
NRBC # BLD: 0 /100 WBCS — SIGNIFICANT CHANGE UP (ref 0–0)
PCO2 BLDA: 32 MMHG — SIGNIFICANT CHANGE UP (ref 32–46)
PCO2 BLDA: 33 MMHG — SIGNIFICANT CHANGE UP (ref 32–46)
PH BLDA: 7.28 — LOW (ref 7.35–7.45)
PH BLDA: 7.31 — LOW (ref 7.35–7.45)
PHOSPHATE SERPL-MCNC: 7.3 MG/DL — HIGH (ref 2.5–4.5)
PLATELET # BLD AUTO: 216 K/UL — SIGNIFICANT CHANGE UP (ref 150–400)
PO2 BLDA: 102 MMHG — SIGNIFICANT CHANGE UP (ref 74–108)
PO2 BLDA: 123 MMHG — HIGH (ref 74–108)
POTASSIUM SERPL-MCNC: 4.7 MMOL/L — SIGNIFICANT CHANGE UP (ref 3.5–5.3)
POTASSIUM SERPL-SCNC: 4.7 MMOL/L — SIGNIFICANT CHANGE UP (ref 3.5–5.3)
PROCALCITONIN SERPL-MCNC: 0.83 NG/ML — HIGH (ref 0.02–0.1)
RBC # BLD: 2.97 M/UL — LOW (ref 3.8–5.2)
RBC # FLD: 13.5 % — SIGNIFICANT CHANGE UP (ref 10.3–14.5)
SAO2 % BLDA: 98 % — HIGH (ref 92–96)
SAO2 % BLDA: 98 % — HIGH (ref 92–96)
SODIUM SERPL-SCNC: 133 MMOL/L — LOW (ref 135–145)
SPECIMEN SOURCE: SIGNIFICANT CHANGE UP
SPECIMEN SOURCE: SIGNIFICANT CHANGE UP
WBC # BLD: 8.26 K/UL — SIGNIFICANT CHANGE UP (ref 3.8–10.5)
WBC # FLD AUTO: 8.26 K/UL — SIGNIFICANT CHANGE UP (ref 3.8–10.5)

## 2020-04-07 PROCEDURE — 99233 SBSQ HOSP IP/OBS HIGH 50: CPT

## 2020-04-07 PROCEDURE — 99291 CRITICAL CARE FIRST HOUR: CPT

## 2020-04-07 RX ORDER — CALCIUM ACETATE 667 MG
1334 TABLET ORAL
Refills: 0 | Status: DISCONTINUED | OUTPATIENT
Start: 2020-04-07 | End: 2020-04-08

## 2020-04-07 RX ORDER — HEPARIN SODIUM 5000 [USP'U]/ML
5000 INJECTION INTRAVENOUS; SUBCUTANEOUS EVERY 8 HOURS
Refills: 0 | Status: DISCONTINUED | OUTPATIENT
Start: 2020-04-07 | End: 2020-04-07

## 2020-04-07 RX ORDER — BUMETANIDE 0.25 MG/ML
2 INJECTION INTRAMUSCULAR; INTRAVENOUS
Qty: 20 | Refills: 0 | Status: DISCONTINUED | OUTPATIENT
Start: 2020-04-07 | End: 2020-04-10

## 2020-04-07 RX ORDER — ENOXAPARIN SODIUM 100 MG/ML
30 INJECTION SUBCUTANEOUS DAILY
Refills: 0 | Status: DISCONTINUED | OUTPATIENT
Start: 2020-04-07 | End: 2020-04-08

## 2020-04-07 RX ORDER — TOCILIZUMAB 20 MG/ML
400 INJECTION, SOLUTION, CONCENTRATE INTRAVENOUS ONCE
Refills: 0 | Status: COMPLETED | OUTPATIENT
Start: 2020-04-07 | End: 2020-04-07

## 2020-04-07 RX ADMIN — BUMETANIDE 10 MG/HR: 0.25 INJECTION INTRAMUSCULAR; INTRAVENOUS at 20:00

## 2020-04-07 RX ADMIN — Medication 1300 MILLIGRAM(S): at 17:39

## 2020-04-07 RX ADMIN — ENOXAPARIN SODIUM 30 MILLIGRAM(S): 100 INJECTION SUBCUTANEOUS at 10:30

## 2020-04-07 RX ADMIN — DEXMEDETOMIDINE HYDROCHLORIDE IN 0.9% SODIUM CHLORIDE 12.5 MICROGRAM(S)/KG/HR: 4 INJECTION INTRAVENOUS at 20:23

## 2020-04-07 RX ADMIN — Medication 1334 MILLIGRAM(S): at 17:39

## 2020-04-07 RX ADMIN — Medication 5 MILLIGRAM(S): at 23:07

## 2020-04-07 RX ADMIN — CHLORHEXIDINE GLUCONATE 15 MILLILITER(S): 213 SOLUTION TOPICAL at 17:40

## 2020-04-07 RX ADMIN — ATORVASTATIN CALCIUM 20 MILLIGRAM(S): 80 TABLET, FILM COATED ORAL at 21:19

## 2020-04-07 RX ADMIN — CHLORHEXIDINE GLUCONATE 15 MILLILITER(S): 213 SOLUTION TOPICAL at 06:43

## 2020-04-07 RX ADMIN — Medication 5 MILLIGRAM(S): at 05:46

## 2020-04-07 RX ADMIN — Medication 2: at 02:46

## 2020-04-07 RX ADMIN — Medication 1 DROP(S): at 06:43

## 2020-04-07 RX ADMIN — PROPOFOL 27 MICROGRAM(S)/KG/MIN: 10 INJECTION, EMULSION INTRAVENOUS at 20:23

## 2020-04-07 RX ADMIN — FAMOTIDINE 20 MILLIGRAM(S): 10 INJECTION INTRAVENOUS at 10:30

## 2020-04-07 RX ADMIN — Medication 2: at 11:11

## 2020-04-07 RX ADMIN — SEVELAMER CARBONATE 800 MILLIGRAM(S): 2400 POWDER, FOR SUSPENSION ORAL at 05:46

## 2020-04-07 RX ADMIN — Medication 5 MILLIGRAM(S): at 17:39

## 2020-04-07 RX ADMIN — Medication 1 DROP(S): at 17:39

## 2020-04-07 RX ADMIN — Medication 2: at 17:39

## 2020-04-07 RX ADMIN — Medication 5 MILLIGRAM(S): at 10:31

## 2020-04-07 RX ADMIN — Medication 100 MICROGRAM(S): at 21:19

## 2020-04-07 RX ADMIN — TOCILIZUMAB 100 MILLIGRAM(S): 20 INJECTION, SOLUTION, CONCENTRATE INTRAVENOUS at 22:11

## 2020-04-07 RX ADMIN — Medication 5 MILLIGRAM(S): at 03:00

## 2020-04-07 RX ADMIN — Medication 81 MILLIGRAM(S): at 10:30

## 2020-04-07 RX ADMIN — SEVELAMER CARBONATE 800 MILLIGRAM(S): 2400 POWDER, FOR SUSPENSION ORAL at 12:33

## 2020-04-07 RX ADMIN — Medication 1300 MILLIGRAM(S): at 05:46

## 2020-04-07 RX ADMIN — LACTULOSE 20 GRAM(S): 10 SOLUTION ORAL at 10:31

## 2020-04-07 NOTE — PHARMACOTHERAPY INTERVENTION NOTE - COMMENTS
64 yo F with MARIO currently on lovenox 30mg SQ daily (SCr 6.05); would recommend switching to heparin 5000 units SQ TID. Also no longer on norepinephrine/ketamine, would recommend discontinuation.    Mikael Parrish, PharmD, BCPS  [phone]

## 2020-04-07 NOTE — PROGRESS NOTE ADULT - ASSESSMENT
66 yo female withHTN, DM, HLD,   presenting with SOB for one week. per patient's , patient had one week of SOB, fevers. Was saturating 70% at home, where patient used family member's oxygen, bringing up to 80-90%. today was 60% on RA so came to ED for further evaluation.     Neuro   - propofol @ 30 mcg/kg/m    -precedex 1 mcg/ kg/ hr     CV  - CVP 7   - Lasix gtt increased to 1, Metolazone 10 mg po/NGT daily per renal  - hypertension, started on metoprolol q6, and hydralazine prn (received pushes of labetalol prior)     Pulmonary  - AC/VC 32/320/30/8  - P/F = 340    GI  - NPO, OGT   - Jevity 1.5 @ 30cc/hr  - miralax and senna   - Nutrition consult ordered     [facility] cath  - BUN/CREAT   71/5.24     Heme   - Lovenox 30 mg SQ daily  - H/H  7.7/24.6     ID  - plaquenil 400 mg Q12 x2 (4/3)  - ceftriaxone 1000 mg IV x1 (4/3)  - Zithromax 500 mg IV x1 (4/3)    Lines   - L  IJ  triple lumen  -  L radial A-line 64 yo female withHTN, DM, HLD,   presenting with SOB for one week. per patient's , patient had one week of SOB, fevers. Was saturating 70% at home, where patient used family member's oxygen, bringing up to 80-90%. today was 60% on RA so came to ED for further evaluation.     Neuro   - propofol @ 45 mcg/kg/min  - precedex 0.9 mcg/ kg/ hr     CV  -off pressors   metalozone 10mg VIA  OGT QD  & metoprolol 5mg IVP q 6hrs   lasix gtt at 10mg /hr   -Labetalol 10 mg IV P x 2, hydralazine 10 x1, now on prn hydralazine 10q6  -MAP 96      Pulmonary  - AC/VC 32/300/30/5  - pPeak25, pPlat 19  - ABG (4/7)  7.28/32/123/15/98%  -- TV/PBW =  6.07  - P/F = 410    GI  - Suplena @ 20cc/hr  -[id number] IVP x1   - Lactulose and senna       - Glass cath  -on sevelemer   - Cr  (4/6 ) 5.8-->   (4/7) 6.05   - s/p Na bicarb for acidosis  - lasix ggt 10/hr     Heme   - Lovenox 30 mg SQ daily  - H/H  Hgb 7.6 from 7.7       ENDO  --206     ID  - completed plaquenil     Lines   - L  IJ  triple lumen  -  L radial A-line 66 yo female withHTN, DM, HLD,   presenting with SOB for one week. per patient's , patient had one week of SOB, fevers. Was saturating 70% at home, where patient used family member's oxygen, bringing up to 80-90%. today was 60% on RA so came to ED for further evaluation.     Neuro   - propofol @ 45 mcg/kg/min  - precedex 0.9 mcg/ kg/ hr     CV  -off pressors   metalozone 10mg VIA  OGT QD  & metoprolol 5mg IVP q 6hrs   lasix gtt at 10mg /hr   -Labetalol 10 mg IV P x 2, hydralazine 10 x1, now on prn hydralazine 10q6  -MAP 96      Pulmonary  - AC/VC 32/300/30/5  - pPeak25, pPlat 19  - ABG (4/7)  7.28/32/123/15/98%  -- TV/PBW =  6.07  - P/F = 410    GI  - Suplena @ 20cc/hr  -[id number] IVP x1   - Lactulose and senna       - Glass cath  -on sevelemer   - Cr  (4/6 ) 5.8-->   (4/7) 6.05   - s/p Na bicarb for acidosis  - lasix ggt 10/hr     Heme   - Lovenox 30 mg SQ daily  - H/H  Hgb 7.6 from 7.7       ENDO  --206     ID  - GAVE  actemra X1 400mg IVPB d/c DR Foote (ID)  - completed plaquenil     Lines   - L  IJ  triple lumen  -  L radial A-line

## 2020-04-07 NOTE — PROGRESS NOTE ADULT - SUBJECTIVE AND OBJECTIVE BOX
[facility] DIVISION OF KIDNEY DISEASES AND HYPERTENSION -- PROGRESS NOTE    Chief complaint: MARIO    24 hour events/subjective:  started on lasix drip      PAST HISTORY  --------------------------------------------------------------------------------  No significant changes to PMH, PSH, FHx, SHx, unless otherwise noted    ALLERGIES & MEDICATIONS  --------------------------------------------------------------------------------  Allergies    No Known Allergies    Intolerances      Standing Inpatient Medications  artificial  tears Solution 1 Drop(s) Both EYES two times a day  aspirin  chewable 81 milliGRAM(s) Enteral Tube daily  atorvastatin 20 milliGRAM(s) Oral at bedtime  chlorhexidine 0.12% Liquid 15 milliLiter(s) Oral Mucosa every 12 hours  dexMEDEtomidine Infusion 0.5 MICROgram(s)/kG/Hr IV Continuous <Continuous>  enoxaparin Injectable 30 milliGRAM(s) SubCutaneous daily  famotidine Injectable 20 milliGRAM(s) IV Push daily  furosemide Infusion 10 mG/Hr IV Continuous <Continuous>  insulin lispro (HumaLOG) corrective regimen sliding scale   SubCutaneous every 6 hours  ketamine Infusion. 1 mG/kG/Hr IV Continuous <Continuous>  lactulose Syrup 20 Gram(s) Oral every 6 hours  levothyroxine Injectable 100 MICROGram(s) IV Push at bedtime  metolazone 10 milliGRAM(s) Oral daily  metoprolol tartrate Injectable 5 milliGRAM(s) IV Push every 6 hours  norepinephrine Infusion 0.01 MICROgram(s)/kG/Min IV Continuous <Continuous>  propofol Infusion 45 MICROgram(s)/kG/Min IV Continuous <Continuous>  rocuronium Injectable 50 milliGRAM(s) IV Push once  senna 2 Tablet(s) Oral at bedtime  sevelamer carbonate 800 milliGRAM(s) Oral every 8 hours  sodium bicarbonate 1300 milliGRAM(s) Oral two times a day    PRN Inpatient Medications  acetaminophen    Suspension .. 650 milliGRAM(s) Enteral Tube every 6 hours PRN  hydrALAZINE Injectable 10 milliGRAM(s) IV Push every 6 hours PRN      REVIEW OF SYSTEMS  --------------------------------------------------------------------------------  Constitutional: [ ] Fever [ ] Chills [ ] Fatigue [ ] Weight change   HEENT: [ ] Blurred vision [ ] Eye Pain [ ] Headache [ ] Runny nose [ ] Sore Throat   Respiratory: [ ] Cough [ ] Wheezing [ ] Shortness of breath  Cardiovascular: [ ] Chest Pain [ ] Palpitations [ ] LOPEZ [ ] PND [ ] Orthopnea  Gastrointestinal: [ ] Abdominal Pain [ ] Diarrhea [ ] Constipation [ ] Hemorrhoids [ ] Nausea [ ] Vomiting  Genitourinary: [ ] Nocturia [ ] Dysuria [ ] Incontinence  Extremities: [ ] Swelling [ ] Joint Pain  Neurologic: [ ] Focal deficit [ ] Paresthesias [ ] Syncope  Lymphatic: [ ] Swelling [ ] Lymphadenopathy   Skin: [ ] Rash [ ] Ecchymoses [ ] Wounds [ ] Lesions  Psychiatry: [ ] Depression [ ] Suicidal/Homicidal Ideation [ ] Anxiety [ ] Sleep Disturbances  [ ] 10 point review of systems is otherwise negative except as mentioned above              x[ ]Unable to obtain due to patient intubated  All other systems were reviewed and are negative, except as noted.    VITALS/PHYSICAL EXAM  --------------------------------------------------------------------------------  T(C): 36 (04-07-20 @ 08:00), Max: 36.3 (04-07-20 @ 05:00)  HR: 66 (04-07-20 @ 11:04) (62 - 72)  BP: --  RR: 22 (04-07-20 @ 08:00) (16 - 23)  SpO2: 100% (04-07-20 @ 11:04) (96% - 100%)  Wt(kg): --        04-06-20 @ 07:01  -  04-07-20 @ 07:00  --------------------------------------------------------  IN: 1505 mL / OUT: 410 mL / NET: 1095 mL    04-07-20 @ 07:01  -  04-07-20 @ 11:43  --------------------------------------------------------  IN: 0 mL / OUT: 75 mL / NET: -75 mL      LABS/STUDIES  --------------------------------------------------------------------------------              7.5    8.26  >-----------<  216      [04-07-20 @ 01:25]              23.7     133  |  100  |  87  ----------------------------<  151      [04-07-20 @ 01:25]  4.7   |  15  |  6.05        Ca     7.1     [04-07-20 @ 01:25]      Mg     2.0     [04-07-20 @ 01:25]      Phos  7.3     [04-07-20 @ 01:25]    TPro  5.6  /  Alb  2.0  /  TBili  0.2  /  DBili  x   /  AST  77  /  ALT  43  /  AlkPhos  443  [04-06-20 @ 00:34]    PT/INR: PT 14.2 , INR 1.24       [04-06-20 @ 00:34]  PTT: 57.4       [04-06-20 @ 00:34]      Creatinine Trend:  SCr 6.05 [04-07 @ 01:25]  SCr 5.80 [04-06 @ 11:39]  SCr 5.88 [04-06 @ 00:34]  SCr 5.52 [04-05 @ 13:57]  SCr 5.24 [04-05 @ 01:32]        Ferritin 195      [04-03-20 @ 22:28]

## 2020-04-07 NOTE — PROGRESS NOTE ADULT - SUBJECTIVE AND OBJECTIVE BOX
HISTORY  65y Female with COVID-19(+) pneumonia requiring intubation.    24 HOUR EVENTS:    SUBJECTIVE/ROS: Due to intubation with sedation, subjective information was not able to be obtained from the patient. History was obtained, to the extent possible, from review of the chart and collateral sources of information.      NEURO  Exam: sedated, no acute distress  Meds: acetaminophen    Suspension .. 650 milliGRAM(s) Enteral Tube every 6 hours PRN Temp greater or equal to 38C (100.4F)  dexMEDEtomidine Infusion 0.5 MICROgram(s)/kG/Hr IV Continuous <Continuous>  ketamine Infusion. 1 mG/kG/Hr IV Continuous <Continuous>  propofol Infusion 45 MICROgram(s)/kG/Min IV Continuous <Continuous>  rocuronium Injectable 50 milliGRAM(s) IV Push once    Adequacy of sedation and pain control has been assessed and adjusted.      RESPIRATORY  RR: 19 (19 - 32)  SpO2: 96% (96% - 100%)  Mechanical Ventilation: Mode: AC/ CMV (Assist Control/ Continuous Mandatory Ventilation), RR (machine): 32, TV (machine): 320, FiO2: 30, PEEP: 5  PaO2 to FiO2 ratio: 340  Extubation readiness assessed.  ABG - ( 07 Apr 2020 01:29 )  pH: 7.31  /  pCO2: 33    /  pO2: 102   / HCO3: 16    / Base Excess: -8.9  /  SaO2: 98      Lactate: x                CARDIOVASCULAR  HR: 70 (61 - 72)  ABP: 132/51 (132/51 - 162/66)  ABP(mean): 80 (80 - 108)  Cardiac Rhythm: sinus  Most recent QTc: 12 Lead ECG:   Ventricular Rate 70 BPM    Atrial Rate 70 BPM    P-R Interval 164 ms    QRS Duration 90 ms    Q-T Interval 438 ms    QTC Calculation(Bezet) 473 ms    P Axis 47 degrees    R Axis 35 degrees    T Axis 84 degrees    Diagnosis Line NORMAL SINUS RHYTHM  NONSPECIFIC T WAVE ABNORMALITY  PROLONGED QT  ABNORMAL ECG    Confirmed by DEEPIKA LONG ADAM ([id number]) on 4/6/2020 8:52:01 PM (04-04 @ 15:29)    Meds: furosemide Infusion (5 mL/Hr)  hydrALAZINE Injectable PRN  metolazone  metoprolol tartrate Injectable  norepinephrine Infusion (0.94 mL/Hr)        GI/NUTRITION  Diet: Diet, NPO with Tube Feed:   Tube Feeding Modality: Orogastric  Suplena with Carb Steady (SUPLENA)  Total Volume for 24 Hours (mL): 480  Continuous  Starting Tube Feed Rate mL per Hour: 20  Until Goal Tube Feed Rate (mL per Hour): 20  Tube Feed Duration (in Hours): 24  Tube Feed Start Time: 17:00 (04-06-20 @ 16:44)    Most recent bowel movement:  Meds/stress ulcer prophylaxis: famotidine Injectable 20 milliGRAM(s) IV Push daily  lactulose Syrup 20 Gram(s) Oral every 6 hours  senna 2 Tablet(s) Oral at bedtime  sodium bicarbonate 1300 milliGRAM(s) Oral two times a day        GENITOURINARY  I&O's Detail    04-05 @ 07:01  -  04-06 @ 07:00  --------------------------------------------------------  IN:    dexmedetomidine Infusion: 247.6 mL    ketamine Infusion.: 160 mL    ns in tub fed  [id number]: 470 mL    propofol Infusion: 308 mL  Total IN: 1185.6 mL    OUT:    Indwelling Catheter - Urethral: 200 mL  Total OUT: 200 mL    Total NET: 985.6 mL      04-06 @ 07:01  -  04-07 @ 02:10  --------------------------------------------------------  IN:    dexmedetomidine Infusion: 460 mL    furosemide Infusion: 3 mL    furosemide Infusion: 25 mL    ketamine Infusion.: 20 mL    ns in tub fed  [id number]: 300 mL    propofol Infusion: 348 mL  Total IN: 1156 mL    OUT:    Indwelling Catheter - Urethral: 320 mL  Total OUT: 320 mL    Total NET: 836 mL          04-06    133<L>  |  102  |  81<H>  ----------------------------<  205<H>  4.5   |  15<L>  |  5.80<H>    Ca    7.4<L>      06 Apr 2020 11:39  Phos  7.3     04-06  Mg     2.1     04-06    TPro  5.6<L>  /  Alb  2.0<L>  /  TBili  0.2  /  DBili  x   /  AST  77<H>  /  ALT  43  /  AlkPhos  443<H>  04-06    [x] Glass catheter, indication: urine output monitoring in critically ill patient.  Meds: sodium bicarbonate 1300 milliGRAM(s) Oral two times a day    Creatine Kinase, Serum: 48 (04-04-20 @ 03:17)        HEMATOLOGIC  Meds: aspirin  chewable 81 milliGRAM(s) Enteral Tube daily  enoxaparin Injectable 30 milliGRAM(s) SubCutaneous daily    VTE Prophylaxis:                         7.5    8.26  )-----------( 216      ( 07 Apr 2020 01:25 )             23.7     PT/INR - ( 06 Apr 2020 00:34 )   PT: 14.2 sec;   INR: 1.24 ratio         PTT - ( 06 Apr 2020 00:34 )  PTT:57.4 sec  19504-03-20 @ 22:28        INFECTIOUS DISEASES  T(C): 35.9, Max: 36.7 (04-06-20 @ 04:00)  WBC Count: 8.26 (04-07-20 @ 01:25)  WBC Count: 6.41 (04-06-20 @ 00:34)    Recent Cultures:  Specimen Source: .Blood Blood-Peripheral, 04-03 @ 17:12; Results   No growth to date.; Gram Stain: --; Organism: --  Specimen Source: .Blood Blood-Peripheral, 04-03 @ 17:11; Results   No growth to date.; Gram Stain: --; Organism: --    Meds:   Procalcitonin, Serum: 0.82 ng/mL (04-06-20 @ 00:34)        ENDOCRINE  177  206  186    Meds: atorvastatin 20 milliGRAM(s) Oral at bedtime  insulin lispro (HumaLOG) corrective regimen sliding scale   SubCutaneous every 6 hours  levothyroxine Injectable 100 MICROGram(s) IV Push at bedtime        ACCESS DEVICES:  [ ] Peripheral IV  [x] Central Venous Line	[ ] R	[x ] L	[x ] IJ	[ ] Fem	[ ] SC	Placed:   [x] Arterial Line		[ ] R	[x ] L	[ ] Fem	[x ] Rad	[ ] Ax	Placed:   [ ] [facility] HD Access             [ ] R [ ] L [ ] IJ  [ ] Fem     Placed:  [x] Urinary Catheter, Date Placed:   Necessity of urinary, arterial, and venous catheters discussed.      CODE STATUS:     IMAGING:

## 2020-04-07 NOTE — PROGRESS NOTE ADULT - ASSESSMENT
65F PMHx DM2, HTN, HLD who present to ED for fever/dyspnea x 1 week.  Per HPI, pt desaturated at home O2 sat 70% which improved nasal cannula (another family member).  In ED hypoxia 70s on nasal cannula s/p intubation.  Covid positive with CXR b/l opacities. Given ceftriaxone, azithromycin, hydroxychlorquine.     Nephrology consulted for MARIO or MARIO on CKD.       MARIO    likely 2/2 ATN in setting septic shock Covid19 infection, low BP  on admission sCr 3.93 on 4/3/2020, has been uptrending since.  Exact duration of elevated Scr however unknown, no prior labs available for review  - maintaining UO, continue with lasix drip and metolazone  - Send UA, urine electrolytes, spot urine TP/CR. Agree with Glass catheter placement.  obtain renal U/S when feasible.  - Monitor labs and urine output. Avoid NSAIDs, ACEI/ARBS, RCA and nephrotoxins. Dose medications as per eGFR.        Metabolic acidosis   likely 2/2 MARIO ATN in setting septic shock covid19, today serum HCO3 15  consider starting sodium bicarb 1300 PO BID  monitor daily    Hyperphosphatemia  in the setting of MARIO   switch renvela to calcium acetate 2 tabs TID with meals  Check intact PTH level. Low phosphorus diet advised. Monitor serum phosphorus    Hypocalcemia   in the setting of hyperphosphatemia and hypoalbuminemia  - monitor ionized calcium, replete to keep >1.1

## 2020-04-07 NOTE — PROGRESS NOTE ADULT - ATTENDING COMMENTS
Pt seen and discussed with ICU staff on 4/7, agree with above.    Neuro: propofol 45/ precedex 0.9; ketamine weaned off yesterday  - Withdraws to pain but does not follow commands  - Wean sedation as long as does not cause vent dyssynchrony    Pulm: 320/ 32/5/30%, O2 sat 98%  P/F 340 from 416; Pplat 8 from 16  - ARDS improving overall    CV: off pressors, started on antihypertensive meds overnight    GI: tolerating tube feeds  - Suplena with CS given MARIO stage 3 with high BUN not on CRRT (prefer low-protein formula)  - TF held overnight for high residuals, but plan to restart  - Pepcid  - Continue lactulose as pt has not had BM    : oliguric stage 3 MARIO  - On lasix gtt making approximately 70cc/hr urine, switched to Bumex gtt after my discussion with Nephro team  - Phosphate binder for hyperphosphatemia  - Increase po bicarb for metabolic acidosis (prefer not to give additional volume with IV bicarb)    ID: Plaquenil dosing per current guideline (400 daily)

## 2020-04-08 LAB
4/8 RATIO: 2 RATIO — SIGNIFICANT CHANGE UP (ref 0.9–3.6)
ABS CD8: 184 /UL — SIGNIFICANT CHANGE UP (ref 142–740)
ALBUMIN SERPL ELPH-MCNC: 1.9 G/DL — LOW (ref 3.3–5)
ALP SERPL-CCNC: 412 U/L — HIGH (ref 40–120)
ALT FLD-CCNC: 35 U/L — SIGNIFICANT CHANGE UP (ref 10–45)
ANION GAP SERPL CALC-SCNC: 22 MMOL/L — HIGH (ref 5–17)
APTT BLD: 72.5 SEC — HIGH (ref 27.5–36.3)
AST SERPL-CCNC: 33 U/L — SIGNIFICANT CHANGE UP (ref 10–40)
BASOPHILS # BLD AUTO: 0.02 K/UL — SIGNIFICANT CHANGE UP (ref 0–0.2)
BASOPHILS NFR BLD AUTO: 0.2 % — SIGNIFICANT CHANGE UP (ref 0–2)
BILIRUB SERPL-MCNC: 0.3 MG/DL — SIGNIFICANT CHANGE UP (ref 0.2–1.2)
BUN SERPL-MCNC: 96 MG/DL — HIGH (ref 7–23)
CALCIUM SERPL-MCNC: 7.5 MG/DL — LOW (ref 8.4–10.5)
CD3 BLASTS SPEC-ACNC: 558 /UL — LOW (ref 672–1870)
CD3 BLASTS SPEC-ACNC: 78 % — SIGNIFICANT CHANGE UP (ref 59–83)
CD4 %: 52 % — SIGNIFICANT CHANGE UP (ref 30–62)
CD8 %: 26 % — SIGNIFICANT CHANGE UP (ref 12–36)
CHLORIDE SERPL-SCNC: 97 MMOL/L — SIGNIFICANT CHANGE UP (ref 96–108)
CK SERPL-CCNC: 36 U/L — SIGNIFICANT CHANGE UP (ref 25–170)
CO2 SERPL-SCNC: 13 MMOL/L — LOW (ref 22–31)
CREAT SERPL-MCNC: 5.96 MG/DL — HIGH (ref 0.5–1.3)
CRP SERPL-MCNC: 34.73 MG/DL — HIGH (ref 0–0.4)
D DIMER BLD IA.RAPID-MCNC: 1191 NG/ML DDU — HIGH
EOSINOPHIL # BLD AUTO: 0.03 K/UL — SIGNIFICANT CHANGE UP (ref 0–0.5)
EOSINOPHIL NFR BLD AUTO: 0.3 % — SIGNIFICANT CHANGE UP (ref 0–6)
FERRITIN SERPL-MCNC: 318 NG/ML — HIGH (ref 15–150)
GAS PNL BLDA: SIGNIFICANT CHANGE UP
GLUCOSE BLDC GLUCOMTR-MCNC: 155 MG/DL — HIGH (ref 70–99)
GLUCOSE BLDC GLUCOMTR-MCNC: 168 MG/DL — HIGH (ref 70–99)
GLUCOSE BLDC GLUCOMTR-MCNC: 175 MG/DL — HIGH (ref 70–99)
GLUCOSE BLDC GLUCOMTR-MCNC: 199 MG/DL — HIGH (ref 70–99)
GLUCOSE SERPL-MCNC: 136 MG/DL — HIGH (ref 70–99)
HCT VFR BLD CALC: 23.7 % — LOW (ref 34.5–45)
HGB BLD-MCNC: 7.5 G/DL — LOW (ref 11.5–15.5)
IMM GRANULOCYTES NFR BLD AUTO: 2 % — HIGH (ref 0–1.5)
INR BLD: 1.51 RATIO — HIGH (ref 0.88–1.16)
LDH SERPL L TO P-CCNC: 290 U/L — HIGH (ref 50–242)
LYMPHOCYTES # BLD AUTO: 0.85 K/UL — LOW (ref 1–3.3)
LYMPHOCYTES # BLD AUTO: 9.8 % — LOW (ref 13–44)
MAGNESIUM SERPL-MCNC: 2.1 MG/DL — SIGNIFICANT CHANGE UP (ref 1.6–2.6)
MCHC RBC-ENTMCNC: 25.7 PG — LOW (ref 27–34)
MCHC RBC-ENTMCNC: 31.6 GM/DL — LOW (ref 32–36)
MCV RBC AUTO: 81.2 FL — SIGNIFICANT CHANGE UP (ref 80–100)
MONOCYTES # BLD AUTO: 0.96 K/UL — HIGH (ref 0–0.9)
MONOCYTES NFR BLD AUTO: 11.1 % — SIGNIFICANT CHANGE UP (ref 2–14)
NEUTROPHILS # BLD AUTO: 6.62 K/UL — SIGNIFICANT CHANGE UP (ref 1.8–7.4)
NEUTROPHILS NFR BLD AUTO: 76.6 % — SIGNIFICANT CHANGE UP (ref 43–77)
NRBC # BLD: 0 /100 WBCS — SIGNIFICANT CHANGE UP (ref 0–0)
PHOSPHATE SERPL-MCNC: 8.7 MG/DL — HIGH (ref 2.5–4.5)
PLATELET # BLD AUTO: 233 K/UL — SIGNIFICANT CHANGE UP (ref 150–400)
POTASSIUM SERPL-MCNC: 5.4 MMOL/L — HIGH (ref 3.5–5.3)
POTASSIUM SERPL-SCNC: 5.4 MMOL/L — HIGH (ref 3.5–5.3)
PROCALCITONIN SERPL-MCNC: 0.94 NG/ML — HIGH (ref 0.02–0.1)
PROT SERPL-MCNC: 6 G/DL — SIGNIFICANT CHANGE UP (ref 6–8.3)
PROTHROM AB SERPL-ACNC: 17.4 SEC — HIGH (ref 10–12.9)
RBC # BLD: 2.92 M/UL — LOW (ref 3.8–5.2)
RBC # FLD: 13.8 % — SIGNIFICANT CHANGE UP (ref 10.3–14.5)
SODIUM SERPL-SCNC: 132 MMOL/L — LOW (ref 135–145)
T-CELL CD4 SUBSET PNL BLD: 370 /UL — LOW (ref 489–1457)
TRIGL SERPL-MCNC: 142 MG/DL — SIGNIFICANT CHANGE UP (ref 10–149)
TROPONIN T, HIGH SENSITIVITY RESULT: 67 NG/L — HIGH (ref 0–51)
VANCOMYCIN FLD-MCNC: <4 UG/ML — SIGNIFICANT CHANGE UP
WBC # BLD: 8.65 K/UL — SIGNIFICANT CHANGE UP (ref 3.8–10.5)
WBC # FLD AUTO: 8.65 K/UL — SIGNIFICANT CHANGE UP (ref 3.8–10.5)

## 2020-04-08 PROCEDURE — 99291 CRITICAL CARE FIRST HOUR: CPT

## 2020-04-08 PROCEDURE — 71045 X-RAY EXAM CHEST 1 VIEW: CPT | Mod: 26

## 2020-04-08 PROCEDURE — 99233 SBSQ HOSP IP/OBS HIGH 50: CPT

## 2020-04-08 PROCEDURE — 76937 US GUIDE VASCULAR ACCESS: CPT | Mod: 26

## 2020-04-08 PROCEDURE — 36556 INSERT NON-TUNNEL CV CATH: CPT | Mod: RT

## 2020-04-08 RX ORDER — LACTULOSE 10 G/15ML
20 SOLUTION ORAL DAILY
Refills: 0 | Status: DISCONTINUED | OUTPATIENT
Start: 2020-04-08 | End: 2020-04-10

## 2020-04-08 RX ORDER — AMLODIPINE BESYLATE 2.5 MG/1
10 TABLET ORAL DAILY
Refills: 0 | Status: DISCONTINUED | OUTPATIENT
Start: 2020-04-08 | End: 2020-04-19

## 2020-04-08 RX ORDER — CALCIUM GLUCONATE 100 MG/ML
2 VIAL (ML) INTRAVENOUS ONCE
Refills: 0 | Status: COMPLETED | OUTPATIENT
Start: 2020-04-08 | End: 2020-04-08

## 2020-04-08 RX ORDER — HEPARIN SODIUM 5000 [USP'U]/ML
7500 INJECTION INTRAVENOUS; SUBCUTANEOUS EVERY 12 HOURS
Refills: 0 | Status: DISCONTINUED | OUTPATIENT
Start: 2020-04-08 | End: 2020-04-15

## 2020-04-08 RX ORDER — SODIUM BICARBONATE 1 MEQ/ML
1300 SYRINGE (ML) INTRAVENOUS THREE TIMES A DAY
Refills: 0 | Status: DISCONTINUED | OUTPATIENT
Start: 2020-04-08 | End: 2020-04-19

## 2020-04-08 RX ORDER — HEPARIN SODIUM 5000 [USP'U]/ML
5000 INJECTION INTRAVENOUS; SUBCUTANEOUS EVERY 8 HOURS
Refills: 0 | Status: DISCONTINUED | OUTPATIENT
Start: 2020-04-08 | End: 2020-04-08

## 2020-04-08 RX ORDER — SEVELAMER CARBONATE 2400 MG/1
1600 POWDER, FOR SUSPENSION ORAL EVERY 8 HOURS
Refills: 0 | Status: DISCONTINUED | OUTPATIENT
Start: 2020-04-08 | End: 2020-04-08

## 2020-04-08 RX ORDER — SEVELAMER CARBONATE 2400 MG/1
1600 POWDER, FOR SUSPENSION ORAL
Refills: 0 | Status: DISCONTINUED | OUTPATIENT
Start: 2020-04-08 | End: 2020-04-10

## 2020-04-08 RX ADMIN — HEPARIN SODIUM 5000 UNIT(S): 5000 INJECTION INTRAVENOUS; SUBCUTANEOUS at 14:09

## 2020-04-08 RX ADMIN — PROPOFOL 27 MICROGRAM(S)/KG/MIN: 10 INJECTION, EMULSION INTRAVENOUS at 08:08

## 2020-04-08 RX ADMIN — Medication 200 GRAM(S): at 06:42

## 2020-04-08 RX ADMIN — DEXMEDETOMIDINE HYDROCHLORIDE IN 0.9% SODIUM CHLORIDE 12.5 MICROGRAM(S)/KG/HR: 4 INJECTION INTRAVENOUS at 17:56

## 2020-04-08 RX ADMIN — Medication 50 MILLIGRAM(S): at 23:42

## 2020-04-08 RX ADMIN — Medication 1300 MILLIGRAM(S): at 04:51

## 2020-04-08 RX ADMIN — Medication 1300 MILLIGRAM(S): at 23:44

## 2020-04-08 RX ADMIN — Medication 5 MILLIGRAM(S): at 04:51

## 2020-04-08 RX ADMIN — CHLORHEXIDINE GLUCONATE 15 MILLILITER(S): 213 SOLUTION TOPICAL at 04:51

## 2020-04-08 RX ADMIN — Medication 2: at 05:10

## 2020-04-08 RX ADMIN — PROPOFOL 27 MICROGRAM(S)/KG/MIN: 10 INJECTION, EMULSION INTRAVENOUS at 17:56

## 2020-04-08 RX ADMIN — ATORVASTATIN CALCIUM 20 MILLIGRAM(S): 80 TABLET, FILM COATED ORAL at 23:41

## 2020-04-08 RX ADMIN — PROPOFOL 27 MICROGRAM(S)/KG/MIN: 10 INJECTION, EMULSION INTRAVENOUS at 14:17

## 2020-04-08 RX ADMIN — Medication 5 MILLIGRAM(S): at 23:43

## 2020-04-08 RX ADMIN — Medication 2: at 17:57

## 2020-04-08 RX ADMIN — BUMETANIDE 10 MG/HR: 0.25 INJECTION INTRAMUSCULAR; INTRAVENOUS at 14:07

## 2020-04-08 RX ADMIN — SEVELAMER CARBONATE 1600 MILLIGRAM(S): 2400 POWDER, FOR SUSPENSION ORAL at 14:17

## 2020-04-08 RX ADMIN — Medication 1300 MILLIGRAM(S): at 18:01

## 2020-04-08 RX ADMIN — Medication 1334 MILLIGRAM(S): at 08:08

## 2020-04-08 RX ADMIN — Medication 1 DROP(S): at 17:54

## 2020-04-08 RX ADMIN — DEXMEDETOMIDINE HYDROCHLORIDE IN 0.9% SODIUM CHLORIDE 12.5 MICROGRAM(S)/KG/HR: 4 INJECTION INTRAVENOUS at 08:09

## 2020-04-08 RX ADMIN — Medication 1 DROP(S): at 04:50

## 2020-04-08 RX ADMIN — Medication 5 MILLIGRAM(S): at 14:17

## 2020-04-08 RX ADMIN — Medication 5 MILLIGRAM(S): at 18:01

## 2020-04-08 RX ADMIN — Medication 100 MICROGRAM(S): at 23:42

## 2020-04-08 RX ADMIN — Medication 81 MILLIGRAM(S): at 14:08

## 2020-04-08 RX ADMIN — SEVELAMER CARBONATE 1600 MILLIGRAM(S): 2400 POWDER, FOR SUSPENSION ORAL at 18:00

## 2020-04-08 RX ADMIN — Medication 2: at 23:43

## 2020-04-08 RX ADMIN — CHLORHEXIDINE GLUCONATE 15 MILLILITER(S): 213 SOLUTION TOPICAL at 18:01

## 2020-04-08 RX ADMIN — DEXMEDETOMIDINE HYDROCHLORIDE IN 0.9% SODIUM CHLORIDE 12.5 MICROGRAM(S)/KG/HR: 4 INJECTION INTRAVENOUS at 14:07

## 2020-04-08 RX ADMIN — Medication 2: at 14:05

## 2020-04-08 RX ADMIN — LACTULOSE 20 GRAM(S): 10 SOLUTION ORAL at 14:08

## 2020-04-08 RX ADMIN — FAMOTIDINE 20 MILLIGRAM(S): 10 INJECTION INTRAVENOUS at 14:08

## 2020-04-08 NOTE — PROGRESS NOTE ADULT - SUBJECTIVE AND OBJECTIVE BOX
[facility] DIVISION OF KIDNEY DISEASES AND HYPERTENSION -- PROGRESS NOTE    Chief complaint: MARIO    24 hour events/subjective:  worsened metabolic acidosis      PAST HISTORY  --------------------------------------------------------------------------------  No significant changes to PMH, PSH, FHx, SHx, unless otherwise noted    ALLERGIES & MEDICATIONS  --------------------------------------------------------------------------------  Allergies    No Known Allergies    Intolerances      Standing Inpatient Medications  artificial  tears Solution 1 Drop(s) Both EYES two times a day  aspirin  chewable 81 milliGRAM(s) Enteral Tube daily  atorvastatin 20 milliGRAM(s) Oral at bedtime  buMETAnide Infusion 2 mG/Hr IV Continuous <Continuous>  chlorhexidine 0.12% Liquid 15 milliLiter(s) Oral Mucosa every 12 hours  dexMEDEtomidine Infusion 0.5 MICROgram(s)/kG/Hr IV Continuous <Continuous>  famotidine Injectable 20 milliGRAM(s) IV Push daily  heparin  Injectable 5000 Unit(s) SubCutaneous every 8 hours  insulin lispro (HumaLOG) corrective regimen sliding scale   SubCutaneous every 6 hours  lactulose Syrup 20 Gram(s) Oral daily  levothyroxine Injectable 100 MICROGram(s) IV Push at bedtime  metolazone 10 milliGRAM(s) Oral daily  metoprolol tartrate Injectable 5 milliGRAM(s) IV Push every 6 hours  propofol Infusion 45 MICROgram(s)/kG/Min IV Continuous <Continuous>  senna 2 Tablet(s) Oral at bedtime  sevelamer carbonate 1600 milliGRAM(s) Oral every 8 hours  sodium bicarbonate 1300 milliGRAM(s) Oral two times a day    PRN Inpatient Medications  acetaminophen    Suspension .. 650 milliGRAM(s) Enteral Tube every 6 hours PRN  hydrALAZINE Injectable 10 milliGRAM(s) IV Push every 6 hours PRN      REVIEW OF SYSTEMS  --------------------------------------------------------------------------------  Constitutional: [ ] Fever [ ] Chills [ ] Fatigue [ ] Weight change   HEENT: [ ] Blurred vision [ ] Eye Pain [ ] Headache [ ] Runny nose [ ] Sore Throat   Respiratory: [ ] Cough [ ] Wheezing [ ] Shortness of breath  Cardiovascular: [ ] Chest Pain [ ] Palpitations [ ] LOPEZ [ ] PND [ ] Orthopnea  Gastrointestinal: [ ] Abdominal Pain [ ] Diarrhea [ ] Constipation [ ] Hemorrhoids [ ] Nausea [ ] Vomiting  Genitourinary: [ ] Nocturia [ ] Dysuria [ ] Incontinence  Extremities: [ ] Swelling [ ] Joint Pain  Neurologic: [ ] Focal deficit [ ] Paresthesias [ ] Syncope  Lymphatic: [ ] Swelling [ ] Lymphadenopathy   Skin: [ ] Rash [ ] Ecchymoses [ ] Wounds [ ] Lesions  Psychiatry: [ ] Depression [ ] Suicidal/Homicidal Ideation [ ] Anxiety [ ] Sleep Disturbances  [ ] 10 point review of systems is otherwise negative except as mentioned above              [x ]Unable to obtain due to patient intubated  All other systems were reviewed and are negative, except as noted.    VITALS/PHYSICAL EXAM  --------------------------------------------------------------------------------  T(C): 37 (04-08-20 @ 12:00), Max: 37.7 (04-07-20 @ 20:00)  HR: 77 (04-08-20 @ 15:13) (65 - 80)  BP: --  RR: 1 (04-08-20 @ 14:00) (0 - 32)  SpO2: 96% (04-08-20 @ 15:13) (96% - 99%)  Wt(kg): --        04-07-20 @ 07:01  -  04-08-20 @ 07:00  --------------------------------------------------------  IN: 2235 mL / OUT: 1540 mL / NET: 695 mL    04-08-20 @ 07:01  -  04-08-20 @ 15:52  --------------------------------------------------------  IN: 696 mL / OUT: 600 mL / NET: 96 mL      Labs : reviewed        Ca     7.5     [04-08-20 @ 03:40]      Mg     2.1     [04-08-20 @ 03:40]      Phos  8.7     [04-08-20 @ 03:40]    TPro  6.0  /  Alb  1.9  /  TBili  0.3  /  DBili  0.2  /  AST  33  /  ALT  35  /  AlkPhos  412  [04-08-20 @ 03:40]    PT/INR: PT 17.4 , INR 1.51       [04-08-20 @ 03:40]  PTT: 72.5       [04-08-20 @ 03:40]    CK 36      [04-08-20 @ 03:40]        [04-08-20 @ 03:40]    Creatinine Trend:  SCr 5.96 [04-08 @ 03:40]  SCr 6.05 [04-07 @ 01:25]  SCr 5.80 [04-06 @ 11:39]  SCr 5.88 [04-06 @ 00:34]  SCr 5.52 [04-05 @ 13:57]        Ferritin 318      [04-08-20 @ 07:44]  Lipid: chol --, , HDL --, LDL --      [04-08-20 @ 03:40]

## 2020-04-08 NOTE — PROGRESS NOTE ADULT - ASSESSMENT
Assessment and Plan:   · Assessment      66 yo female withHTN, DM, HLD,   presenting with SOB for one week. per patient's , patient had one week of SOB, fevers. Was saturating 70% at home, where patient used family member's oxygen, bringing up to 80-90%. today was 60% on RA so came to ED for further evaluation.     Neuro   - propofol @ 45 mcg/kg/min  - Precedex 0.9 mcg/ kg/ hr     CV  -off pressors   metolazone 10mg VIA  OGT QD  & metoprolol 5mg IVP q 6hrs   Lasix gtt at 10mg /hr   -Labetalol 10 mg IV P x 2, hydralazine 10 x1, now on prn hydralazine 10q6  -MAP 96      Pulmonary  - AC/VC 32/300/30/5  - pPeak25, pPlat 19  - ABG (4/7)  7.28/32/123/15/98%  -- TV/PBW =  6.07  - P/F = 410    GI  - Suplena @ 20cc/hr  -[id number] IVP x1   - Lactulose and senna       - Glass cath  -on sevelamer   - Cr  (4/6 ) 5.8-->   (4/7) 6.05   - s/p Na bicarb for acidosis  - Lasix gtt 10/hr     Heme   - Lovenox 30 mg SQ daily  - H/H  Hgb 7.6 from 7.7       ENDO  - c/w ISS   --206     ID  - GAVE  actemra X1 400mg IVPB d/c DR Foote (ID)  - completed Plaquenil     Lines   - L  IJ  triple lumen  -  L radial A-line Assessment and Plan:   · Assessment		  66 yo female withHTN, DM, HLD,   presenting with SOB for one week. per patient's , patient had one week of SOB, fevers. Was saturating 70% at home, where patient used family member's oxygen, bringing up to 80-90%. today was 60% on RA so came to ED for further evaluation.     Neuro   - propofol @ 45 mcg/kg/min  - Precedex 0.9 mcg/ kg/ hr     CV  -off pressors   metolazone 10mg VIA  OGT QD  & metoprolol 5mg IVP q 6hrs   Lasix gtt at 10mg /hr   -Labetalol 10 mg IV P x 2, hydralazine 10 x1, now on prn hydralazine 10q6  -MAP 96      Pulmonary  - AC/VC 32/300/30/5  - pPeak25, pPlat 19  - ABG (4/7)  7.28/32/123/15/98%  - (04/08) ABG :   -- TV/PBW =  6.07  - P/F = 410    GI  - Suplena @ 20cc/hr  -[id number] IVP x1   - Lactulose and senna       - Glass cath  -on sevelamer   - Cr  (4/6 ) 5.8-->   (4/7) 6.05   - s/p Na bicarb for acidosis  - Lasix gtt 10/hr     Heme   - Lovenox 30 mg SQ daily  - H/H  Hgb 7.6 from 7.7       ENDO  - c/w ISS   --206     ID  - GAVE  actemra X1 400mg IVPB d/c DR Foote (ID)  - completed Plaquenil     Lines   - L  IJ  triple lumen  -  L radial A-line Assessment and Plan:   · Assessment		  66 yo female withHTN, DM, HLD,   presenting with SOB for one week. per patient's , patient had one week of SOB, fevers. Was saturating 70% at home, where patient used family member's oxygen, bringing up to 80-90%. today was 60% on RA so came to ED for further evaluation.     Neuro   - propofol @ 45 mcg/kg/min  - Precedex 0.9 mcg/ kg/ hr     CV  -off pressors   metolazone 10mg VIA  OGT QD  & metoprolol 5mg IVP q 6hrs   Lasix gtt at 10mg /hr off  - 04/08 Started Bumex 2mg/hr   -Labetalol 10 mg IV P x 2, hydralazine 10 x1, now on prn hydralazine 10q6  -MAP 96      Pulmonary  - AC/VC 32/300/30/5  - pPeak25, pPlat 19  - ABG (4/7)  7.28/32/123/15/98%  - (04/08) ABG : 7.28/31/116/14  28  -- TV/PBW =  6.07  - P/F = 410    GI  - Suplena @ 20cc/hr  -[id number] IVP x1   - Lactulose and senna       - Glass cath  -on sevelamer   - Cr  (4/6 ) 5.8-->   (4/7) 6.05 > (04/08) > 5.96  - s/p Na bicarb for acidosis  - Bumex gtt 2mg/hr       Heme   - Lovenox 30 mg SQ daily  - H/H  Hgb 7.6 from 7.7 > 7.5 (04/08)      ENDO  - c/w ISS   --206     ID  - GAVE  actemra X1 400mg IVPB d/c DR Foote (ID)  - completed Plaquenil     Lines   - L  IJ  triple lumen  -  L radial A-line Assessment and Plan:   · Assessment		  64 yo female withHTN, DM, HLD,   presenting with SOB for one week. per patient's , patient had one week of SOB, fevers. Was saturating 70% at home, where patient used family member's oxygen, bringing up to 80-90%. today was 60% on RA so came to ED for further evaluation.     Neuro   - propofol @ 45 mcg/kg/min  - Precedex 0.9 mcg/ kg/ hr     CV  -off pressors   - off pressors   - metoprolol 5mg IVP q 6hrs   - hydralazine 10 mg IV q6 PRN MAP>100  -MAP 96      Pulmonary  - AC/VC 32/300/30/5  - pPeak25, pPlat 19  - ABG (4/7)  7.28/32/123/15/98%  - ABG (04/08) 7.28/31/116/14  -- TV/PBW =  6.07  - P/F =386    GI  - Suplena increased to goal @ 30cc/hr  -pepcid 20mg IVP x1   - Lactulose and senna       renal will start HD 4/9 (  ) needs access  metolazone 10mg VIA  OGT QD  &   BUMEX GTT AT 2 MG/HR    - Glass cath  -on sevelamer   - Cr   (04/08) 5.96  - Na bicarb for acidosis  - 04/07 STARTED Bumex 2mg/hr   - UOP 800CC/SHIFT     Heme   - HSQ daily  - H/H  stable (4/08) 7.5/23.7      ENDO  --162    ID  -4/7 ACTEMRA  400mg IVPB x 1 given (d/w Dr Foote ID)   - completed Plaquenil     Lines   {  } will need access for HD 4/9  - L  IJ  triple lumen  -  L radial A-line

## 2020-04-08 NOTE — PROGRESS NOTE ADULT - ASSESSMENT
65F PMHx DM2, HTN, HLD who present to ED for fever/dyspnea x 1 week.  Per HPI, pt desaturated at home O2 sat 70% which improved nasal cannula (another family member).  In ED hypoxia 70s on nasal cannula s/p intubation.  Covid positive with CXR b/l opacities. Given ceftriaxone, azithromycin, hydroxychlorquine.     Nephrology consulted for MARIO or MARIO on CKD.       MARIO    likely 2/2 ATN in setting septic shock Covid19 infection, low BP  on admission sCr 3.93 on 4/3/2020, has been uptrending since.  Exact duration of elevated Scr however unknown, no prior labs available for review  - maintaining UO, continue with lasix drip and metolazone  - Send UA, urine electrolytes, spot urine TP/CR. Agree with Glass catheter placement.  obtain renal U/S when feasible.  - Monitor labs and urine output. Avoid NSAIDs, ACEI/ARBS, RCA and nephrotoxins. Dose medications as per eGFR.        Metabolic acidosis   likely 2/2 MARIO ATN in setting septic shock covid19, today serum HCO3 15  consider increasing sodium bicarb to 1300 PO TID  monitor daily    Hyperphosphatemia  in the setting of MARIO   switch renvela to calcium acetate 2 tabs TID with meals  Check intact PTH level. Low phosphorus diet advised. Monitor serum phosphorus    Hypocalcemia   in the setting of hyperphosphatemia and hypoalbuminemia  - monitor ionized calcium, replete to keep >1.1

## 2020-04-08 NOTE — PROGRESS NOTE ADULT - SUBJECTIVE AND OBJECTIVE BOX
HISTORY  65y Female with COVID-19(+) pneumonia requiring intubation.    24 HOUR EVENTS:    SUBJECTIVE/ROS: Due to intubation with sedation, subjective information was not able to be obtained from the patient. History was obtained, to the extent possible, from review of the chart and collateral sources of information.      NEURO  Exam: sedated, no acute distress  Meds: acetaminophen    Suspension .. 650 milliGRAM(s) Enteral Tube every 6 hours PRN Temp greater or equal to 38C (100.4F)  dexMEDEtomidine Infusion 0.5 MICROgram(s)/kG/Hr IV Continuous <Continuous>  propofol Infusion 45 MICROgram(s)/kG/Min IV Continuous <Continuous>    Adequacy of sedation and pain control has been assessed and adjusted.      RESPIRATORY  RR: 9 (9 - 32)  SpO2: 97% (97% - 100%)  Exam: without dissynchrony  Mechanical Ventilation: Mode: AC/ CMV (Assist Control/ Continuous Mandatory Ventilation), RR (machine): 32, TV (machine): 300, FiO2: 30, PEEP: 5  Predicted Body Weight:  Tidal Volume/PBW ratio:  PaO2 to FiO2 ratio: 410  Extubation readiness assessed.  ABG - ( 07 Apr 2020 12:30 )  pH: 7.28  /  pCO2: 32    /  pO2: 123   / HCO3: 15    / Base Excess: -10.4 /  SaO2: 98      Lactate: x                CARDIOVASCULAR  HR: 76 (65 - 76)  ABP: 141/50 (134/53 - 143/53)  ABP(mean): 82 (82 - 86)  Cardiac Rhythm: sinus  Most recent QTc: 12 Lead ECG:   Ventricular Rate 70 BPM    Atrial Rate 70 BPM    P-R Interval 164 ms    QRS Duration 90 ms    Q-T Interval 438 ms    QTC Calculation(Bezet) 473 ms    P Axis 47 degrees    R Axis 35 degrees    T Axis 84 degrees    Diagnosis Line NORMAL SINUS RHYTHM  NONSPECIFIC T WAVE ABNORMALITY  PROLONGED QT  ABNORMAL ECG    Confirmed by DEEPIKA LONG ADAM ([id number]) on 4/6/2020 8:52:01 PM (04-04 @ 15:29)    Meds: buMETAnide Infusion (10 mL/Hr)  hydrALAZINE Injectable PRN  metolazone  metoprolol tartrate Injectable        GI/NUTRITION  Diet: Diet, NPO with Tube Feed:   Tube Feeding Modality: Orogastric  Suplena with Carb Steady (SUPLENA)  Total Volume for 24 Hours (mL): 480  Continuous  Starting Tube Feed Rate mL per Hour: 10  Increase Tube Feed Rate by (mL): 10     Every 4 hours  Until Goal Tube Feed Rate (mL per Hour): 20  Tube Feed Duration (in Hours): 24  Tube Feed Start Time: 13:31 (04-07-20 @ 13:32)    Most recent bowel movement:  Meds/stress ulcer prophylaxis: calcium acetate 1334 milliGRAM(s) Oral three times a day with meals  famotidine Injectable 20 milliGRAM(s) IV Push daily  lactulose Syrup 20 Gram(s) Oral every 6 hours  senna 2 Tablet(s) Oral at bedtime  sodium bicarbonate 1300 milliGRAM(s) Oral two times a day        GENITOURINARY  I&O's Detail    04-06 @ 07:01  -  04-07 @ 07:00  --------------------------------------------------------  IN:    dexmedetomidine Infusion: 605 mL    furosemide Infusion: 55 mL    furosemide Infusion: 3 mL    ketamine Infusion.: 20 mL    ns in tub fed  [id number]: 300 mL    propofol Infusion: 522 mL  Total IN: 1505 mL    OUT:    Indwelling Catheter - Urethral: 410 mL  Total OUT: 410 mL    Total NET: 1095 mL      04-07 @ 07:01  -  04-08 @ 01:21  --------------------------------------------------------  IN:    bumetanide Infusion: 50 mL    dexmedetomidine Infusion: 382.5 mL    Enteral Tube Flush: 60 mL    furosemide Infusion: 50 mL    IV PiggyBack: 100 mL    ns in tub fed  [id number]: 450 mL    propofol Infusion: 459 mL  Total IN: 1551.5 mL    OUT:    Indwelling Catheter - Urethral: 1035 mL  Total OUT: 1035 mL    Total NET: 516.5 mL          04-07    133<L>  |  100  |  87<H>  ----------------------------<  151<H>  4.7   |  15<L>  |  6.05<H>    Ca    7.1<L>      07 Apr 2020 01:25  Phos  7.3     04-07  Mg     2.0     04-07      [x] Glass catheter, indication: urine output monitoring in critically ill patient.  Meds: calcium acetate 1334 milliGRAM(s) Oral three times a day with meals  sodium bicarbonate 1300 milliGRAM(s) Oral two times a day    Creatine Kinase, Serum: 48 (04-04-20 @ 03:17)        HEMATOLOGIC  Meds/VTE prophylaxis: aspirin  chewable 81 milliGRAM(s) Enteral Tube daily  enoxaparin Injectable 30 milliGRAM(s) SubCutaneous daily                          7.5    8.26  )-----------( 216      ( 07 Apr 2020 01:25 )             23.7             INFECTIOUS DISEASES  T(C): 37.3, Max: 37.7 (04-07-20 @ 20:00)  WBC Count: 8.26 (04-07-20 @ 01:25)    Recent Cultures:  Specimen Source: .Blood Blood-Peripheral, 04-03 @ 17:12; Results   No Growth Final; Gram Stain: --; Organism: --  Specimen Source: .Blood Blood-Peripheral, 04-03 @ 17:11; Results   No Growth Final; Gram Stain: --; Organism: --    Meds:   Procalcitonin, Serum: 0.83 ng/mL (04-07-20 @ 01:25)        ENDOCRINE  Fingersticks: 119, 160, 154, 129, 177  Meds: atorvastatin 20 milliGRAM(s) Oral at bedtime  insulin lispro (HumaLOG) corrective regimen sliding scale   SubCutaneous every 6 hours  levothyroxine Injectable 100 MICROGram(s) IV Push at bedtime        ACCESS DEVICES:  [ ] Peripheral IV  [x] Central Venous Line	[ ] R	[ ] L	[ ] IJ	[ ] Fem	[ ] SC	Placed:   [x] Arterial Line		[ ] R	[ ] L	[ ] Fem	[ ] Rad	[ ] Ax	Placed:   [ ] Sae HD Access             [ ] R [ ] L [ ] IJ  [ ] Fem     Placed:  [x] Urinary Catheter, Date Placed:   Necessity of urinary, arterial, and venous catheters discussed.      CODE STATUS:     IMAGING:

## 2020-04-08 NOTE — PROGRESS NOTE ADULT - ATTENDING COMMENTS
Pt seen and discussed with ICU staff, agree with above.    Neuro: propofol 45/ precedex 0.9  - Withdraws to pain but does not follow commands  - Wean sedation as long as does not cause vent dyssynchrony    Pulm: 300/ 32/5/30%, O2 sat 97%  P/F 386 from 340 from 416; Pplat 19 from 8 from 16  - ARDS improving overall    CV: off pressors; will hold off on starting home antihypertensives as will likely need HD    GI: tolerating tube feeds  - Suplena with CS given MARIO stage 3 with high BUN not on CRRT (prefer low-protein formula)  - Increased to goal  - Pepcid  - Had BM yesterday    : oliguric stage 3 MARIO on CKD (unclear baseline Cr, last Cr in our system from 2018  - urine output improved with Bumex  - Phosphate binder for hyperphosphatemia  - Still has significant metabolic acidosis despite po bicarb. Will likely need RRT, and may tolerate HD, d/w Nephro team today    ID: Plaquenil dosing per current guideline (400 daily) completed; dose of Actemra given today  - Inflammatory markers: CRP, ferritin, and D-dimers are all increasing. Pt has already received Actemra so will give course of steroids. If still rising tomorrow, will consider adding Anakinra    Heme: Will change to HSQ 7500 q12 per new system guidelines for chemical VTE prophylaxis in COVID patients (CrCl < 20, BMI >30)

## 2020-04-08 NOTE — PROCEDURE NOTE - NSICDXPROCEDURE_GEN_ALL_CORE_FT
PROCEDURES:  Central venous catheter placement with ultrasound guidance 08-Apr-2020 23:01:37  Marco Fitzpatrick

## 2020-04-08 NOTE — CHART NOTE - NSCHARTNOTEFT_GEN_A_CORE
Nephrology    Thoroughly reviewed risks and benefits of RRT/HD with patient's . Patient's  agrees to dialytic therapy if needed. All questions answered. Will plan on iniating HD today (4/9/20). Nephrology    Thoroughly reviewed risks and benefits of RRT/HD with patient's . Patient's  agrees to dialytic therapy if needed. All questions answered. Will plan on iniating HD today (4/9/20).  Dr Edd Galloway witness

## 2020-04-09 LAB
ALBUMIN SERPL ELPH-MCNC: 2 G/DL — LOW (ref 3.3–5)
ALBUMIN SERPL ELPH-MCNC: 2.1 G/DL — LOW (ref 3.3–5)
ALP SERPL-CCNC: 571 U/L — HIGH (ref 40–120)
ALP SERPL-CCNC: 595 U/L — HIGH (ref 40–120)
ALT FLD-CCNC: 35 U/L — SIGNIFICANT CHANGE UP (ref 10–45)
ALT FLD-CCNC: 37 U/L — SIGNIFICANT CHANGE UP (ref 10–45)
ANION GAP SERPL CALC-SCNC: 24 MMOL/L — HIGH (ref 5–17)
ANION GAP SERPL CALC-SCNC: 24 MMOL/L — HIGH (ref 5–17)
AST SERPL-CCNC: 39 U/L — SIGNIFICANT CHANGE UP (ref 10–40)
AST SERPL-CCNC: 40 U/L — SIGNIFICANT CHANGE UP (ref 10–40)
BILIRUB SERPL-MCNC: 0.2 MG/DL — SIGNIFICANT CHANGE UP (ref 0.2–1.2)
BILIRUB SERPL-MCNC: 0.3 MG/DL — SIGNIFICANT CHANGE UP (ref 0.2–1.2)
BUN SERPL-MCNC: 104 MG/DL — HIGH (ref 7–23)
BUN SERPL-MCNC: 97 MG/DL — HIGH (ref 7–23)
CALCIUM SERPL-MCNC: 8 MG/DL — LOW (ref 8.4–10.5)
CALCIUM SERPL-MCNC: 8.1 MG/DL — LOW (ref 8.4–10.5)
CHLORIDE SERPL-SCNC: 93 MMOL/L — LOW (ref 96–108)
CHLORIDE SERPL-SCNC: 94 MMOL/L — LOW (ref 96–108)
CO2 SERPL-SCNC: 11 MMOL/L — LOW (ref 22–31)
CO2 SERPL-SCNC: 13 MMOL/L — LOW (ref 22–31)
CREAT SERPL-MCNC: 5.85 MG/DL — HIGH (ref 0.5–1.3)
CREAT SERPL-MCNC: 6.02 MG/DL — HIGH (ref 0.5–1.3)
GAS PNL BLDA: SIGNIFICANT CHANGE UP
GLUCOSE BLDC GLUCOMTR-MCNC: 154 MG/DL — HIGH (ref 70–99)
GLUCOSE BLDC GLUCOMTR-MCNC: 255 MG/DL — HIGH (ref 70–99)
GLUCOSE BLDC GLUCOMTR-MCNC: 255 MG/DL — HIGH (ref 70–99)
GLUCOSE BLDC GLUCOMTR-MCNC: 264 MG/DL — HIGH (ref 70–99)
GLUCOSE BLDC GLUCOMTR-MCNC: 273 MG/DL — HIGH (ref 70–99)
GLUCOSE BLDC GLUCOMTR-MCNC: 278 MG/DL — HIGH (ref 70–99)
GLUCOSE BLDC GLUCOMTR-MCNC: 306 MG/DL — HIGH (ref 70–99)
GLUCOSE SERPL-MCNC: 269 MG/DL — HIGH (ref 70–99)
GLUCOSE SERPL-MCNC: 273 MG/DL — HIGH (ref 70–99)
HCT VFR BLD CALC: 25.1 % — LOW (ref 34.5–45)
HGB BLD-MCNC: 7.8 G/DL — LOW (ref 11.5–15.5)
MAGNESIUM SERPL-MCNC: 2.2 MG/DL — SIGNIFICANT CHANGE UP (ref 1.6–2.6)
MAGNESIUM SERPL-MCNC: 2.2 MG/DL — SIGNIFICANT CHANGE UP (ref 1.6–2.6)
MCHC RBC-ENTMCNC: 25.2 PG — LOW (ref 27–34)
MCHC RBC-ENTMCNC: 31.1 GM/DL — LOW (ref 32–36)
MCV RBC AUTO: 81 FL — SIGNIFICANT CHANGE UP (ref 80–100)
NRBC # BLD: 0 /100 WBCS — SIGNIFICANT CHANGE UP (ref 0–0)
PHOSPHATE SERPL-MCNC: 10.3 MG/DL — HIGH (ref 2.5–4.5)
PHOSPHATE SERPL-MCNC: 10.4 MG/DL — HIGH (ref 2.5–4.5)
PLATELET # BLD AUTO: 235 K/UL — SIGNIFICANT CHANGE UP (ref 150–400)
POTASSIUM SERPL-MCNC: 5.7 MMOL/L — HIGH (ref 3.5–5.3)
POTASSIUM SERPL-MCNC: 5.7 MMOL/L — HIGH (ref 3.5–5.3)
POTASSIUM SERPL-SCNC: 5.7 MMOL/L — HIGH (ref 3.5–5.3)
POTASSIUM SERPL-SCNC: 5.7 MMOL/L — HIGH (ref 3.5–5.3)
PROCALCITONIN SERPL-MCNC: 0.75 NG/ML — HIGH (ref 0.02–0.1)
PROT SERPL-MCNC: 6 G/DL — SIGNIFICANT CHANGE UP (ref 6–8.3)
PROT SERPL-MCNC: 6 G/DL — SIGNIFICANT CHANGE UP (ref 6–8.3)
RBC # BLD: 3.1 M/UL — LOW (ref 3.8–5.2)
RBC # FLD: 13.9 % — SIGNIFICANT CHANGE UP (ref 10.3–14.5)
SODIUM SERPL-SCNC: 128 MMOL/L — LOW (ref 135–145)
SODIUM SERPL-SCNC: 131 MMOL/L — LOW (ref 135–145)
VANCOMYCIN FLD-MCNC: <4 UG/ML — SIGNIFICANT CHANGE UP
WBC # BLD: 8.69 K/UL — SIGNIFICANT CHANGE UP (ref 3.8–10.5)
WBC # FLD AUTO: 8.69 K/UL — SIGNIFICANT CHANGE UP (ref 3.8–10.5)

## 2020-04-09 PROCEDURE — 99291 CRITICAL CARE FIRST HOUR: CPT

## 2020-04-09 PROCEDURE — 99233 SBSQ HOSP IP/OBS HIGH 50: CPT

## 2020-04-09 RX ORDER — INSULIN HUMAN 100 [IU]/ML
2 INJECTION, SOLUTION SUBCUTANEOUS
Qty: 100 | Refills: 0 | Status: DISCONTINUED | OUTPATIENT
Start: 2020-04-09 | End: 2020-04-12

## 2020-04-09 RX ADMIN — Medication 5 MILLIGRAM(S): at 23:52

## 2020-04-09 RX ADMIN — Medication 10 MILLIGRAM(S): at 17:48

## 2020-04-09 RX ADMIN — SEVELAMER CARBONATE 1600 MILLIGRAM(S): 2400 POWDER, FOR SUSPENSION ORAL at 11:27

## 2020-04-09 RX ADMIN — Medication 5 MILLIGRAM(S): at 11:17

## 2020-04-09 RX ADMIN — Medication 1300 MILLIGRAM(S): at 13:38

## 2020-04-09 RX ADMIN — HEPARIN SODIUM 7500 UNIT(S): 5000 INJECTION INTRAVENOUS; SUBCUTANEOUS at 05:09

## 2020-04-09 RX ADMIN — Medication 8: at 11:29

## 2020-04-09 RX ADMIN — HEPARIN SODIUM 7500 UNIT(S): 5000 INJECTION INTRAVENOUS; SUBCUTANEOUS at 17:09

## 2020-04-09 RX ADMIN — Medication 6: at 06:12

## 2020-04-09 RX ADMIN — Medication 50 MILLIGRAM(S): at 23:52

## 2020-04-09 RX ADMIN — Medication 1 DROP(S): at 05:09

## 2020-04-09 RX ADMIN — SEVELAMER CARBONATE 1600 MILLIGRAM(S): 2400 POWDER, FOR SUSPENSION ORAL at 17:13

## 2020-04-09 RX ADMIN — Medication 1 DROP(S): at 17:09

## 2020-04-09 RX ADMIN — Medication 50 MILLIGRAM(S): at 05:10

## 2020-04-09 RX ADMIN — Medication 10 MILLIGRAM(S): at 12:00

## 2020-04-09 RX ADMIN — Medication 81 MILLIGRAM(S): at 11:17

## 2020-04-09 RX ADMIN — AMLODIPINE BESYLATE 10 MILLIGRAM(S): 2.5 TABLET ORAL at 05:09

## 2020-04-09 RX ADMIN — Medication 100 MICROGRAM(S): at 23:51

## 2020-04-09 RX ADMIN — CHLORHEXIDINE GLUCONATE 15 MILLILITER(S): 213 SOLUTION TOPICAL at 17:08

## 2020-04-09 RX ADMIN — SEVELAMER CARBONATE 1600 MILLIGRAM(S): 2400 POWDER, FOR SUSPENSION ORAL at 07:31

## 2020-04-09 RX ADMIN — Medication 1300 MILLIGRAM(S): at 23:51

## 2020-04-09 RX ADMIN — CHLORHEXIDINE GLUCONATE 15 MILLILITER(S): 213 SOLUTION TOPICAL at 05:09

## 2020-04-09 RX ADMIN — Medication 1300 MILLIGRAM(S): at 05:10

## 2020-04-09 RX ADMIN — INSULIN HUMAN 2 UNIT(S)/HR: 100 INJECTION, SOLUTION SUBCUTANEOUS at 12:00

## 2020-04-09 RX ADMIN — Medication 5 MILLIGRAM(S): at 05:10

## 2020-04-09 RX ADMIN — FAMOTIDINE 20 MILLIGRAM(S): 10 INJECTION INTRAVENOUS at 11:17

## 2020-04-09 RX ADMIN — Medication 5 MILLIGRAM(S): at 17:09

## 2020-04-09 RX ADMIN — Medication 50 MILLIGRAM(S): at 13:37

## 2020-04-09 NOTE — PROGRESS NOTE ADULT - SUBJECTIVE AND OBJECTIVE BOX
[facility] DIVISION OF KIDNEY DISEASES AND HYPERTENSION -- PROGRESS NOTE    Chief complaint: MARIO    24 hour events/subjective:  s/p dialysis catheter placement last night        PAST HISTORY  --------------------------------------------------------------------------------  No significant changes to PMH, PSH, FHx, SHx, unless otherwise noted    ALLERGIES & MEDICATIONS  --------------------------------------------------------------------------------  Allergies    No Known Allergies    Intolerances      Standing Inpatient Medications  amLODIPine   Tablet 10 milliGRAM(s) Oral daily  artificial  tears Solution 1 Drop(s) Both EYES two times a day  aspirin  chewable 81 milliGRAM(s) Enteral Tube daily  atorvastatin 20 milliGRAM(s) Oral at bedtime  buMETAnide Infusion 2 mG/Hr IV Continuous <Continuous>  chlorhexidine 0.12% Liquid 15 milliLiter(s) Oral Mucosa every 12 hours  dexMEDEtomidine Infusion 0.5 MICROgram(s)/kG/Hr IV Continuous <Continuous>  famotidine Injectable 20 milliGRAM(s) IV Push daily  heparin  Injectable 7500 Unit(s) SubCutaneous every 12 hours  insulin regular Infusion 2 Unit(s)/Hr IV Continuous <Continuous>  lactulose Syrup 20 Gram(s) Oral daily  levothyroxine Injectable 100 MICROGram(s) IV Push at bedtime  methylPREDNISolone sodium succinate Injectable 50 milliGRAM(s) IV Push every 8 hours  metolazone 10 milliGRAM(s) Oral daily  metoprolol tartrate Injectable 5 milliGRAM(s) IV Push every 6 hours  propofol Infusion 45 MICROgram(s)/kG/Min IV Continuous <Continuous>  senna 2 Tablet(s) Oral at bedtime  sevelamer carbonate Powder 1600 milliGRAM(s) Oral three times a day with meals  sodium bicarbonate 1300 milliGRAM(s) Oral three times a day    PRN Inpatient Medications  acetaminophen    Suspension .. 650 milliGRAM(s) Enteral Tube every 6 hours PRN  hydrALAZINE Injectable 10 milliGRAM(s) IV Push every 6 hours PRN      REVIEW OF SYSTEMS  --------------------------------------------------------------------------------  Constitutional: [ ] Fever [ ] Chills [ ] Fatigue [ ] Weight change   HEENT: [ ] Blurred vision [ ] Eye Pain [ ] Headache [ ] Runny nose [ ] Sore Throat   Respiratory: [ ] Cough [ ] Wheezing [ ] Shortness of breath  Cardiovascular: [ ] Chest Pain [ ] Palpitations [ ] LOPEZ [ ] PND [ ] Orthopnea  Gastrointestinal: [ ] Abdominal Pain [ ] Diarrhea [ ] Constipation [ ] Hemorrhoids [ ] Nausea [ ] Vomiting  Genitourinary: [ ] Nocturia [ ] Dysuria [ ] Incontinence  Extremities: [ ] Swelling [ ] Joint Pain  Neurologic: [ ] Focal deficit [ ] Paresthesias [ ] Syncope  Lymphatic: [ ] Swelling [ ] Lymphadenopathy   Skin: [ ] Rash [ ] Ecchymoses [ ] Wounds [ ] Lesions  Psychiatry: [ ] Depression [ ] Suicidal/Homicidal Ideation [ ] Anxiety [ ] Sleep Disturbances  [ ] 10 point review of systems is otherwise negative except as mentioned above              [x ]Unable to obtain due to patient intubated  All other systems were reviewed and are negative, except as noted.    VITALS/PHYSICAL EXAM  --------------------------------------------------------------------------------  T(C): 36.4 (04-09-20 @ 11:00), Max: 37 (04-08-20 @ 12:00)  HR: 72 (04-09-20 @ 11:00) (68 - 77)  BP: --  RR: 27 (04-09-20 @ 11:00) (0 - 36)  SpO2: 95% (04-09-20 @ 11:00) (94% - 97%)  Wt(kg): --        04-08-20 @ 07:01  -  04-09-20 @ 07:00  --------------------------------------------------------  IN: 1923.5 mL / OUT: 2380 mL / NET: -456.5 mL    04-09-20 @ 07:01  -  04-09-20 @ 11:57  --------------------------------------------------------  IN: 166.5 mL / OUT: 625 mL / NET: -458.5 mL     LABS/STUDIES  --------------------------------------------------------------------------------              7.8    8.69  >-----------<  235      [04-09-20 @ 04:59]              25.1     128  |  93  |  97  ----------------------------<  273      [04-09-20 @ 04:59]  5.7   |  11  |  6.02        Ca     8.0     [04-09-20 @ 04:59]      Mg     2.2     [04-09-20 @ 04:59]      Phos  10.3     [04-09-20 @ 04:59]    TPro  6.0  /  Alb  2.0  /  TBili  0.3  /  DBili  0.2  /  AST  40  /  ALT  35  /  AlkPhos  571  [04-09-20 @ 04:59]    PT/INR: PT 17.4 , INR 1.51       [04-08-20 @ 03:40]  PTT: 72.5       [04-08-20 @ 03:40]    CK 36      [04-08-20 @ 03:40]        [04-08-20 @ 03:40]    Creatinine Trend:  SCr 6.02 [04-09 @ 04:59]  SCr 5.96 [04-08 @ 03:40]  SCr 6.05 [04-07 @ 01:25]  SCr 5.80 [04-06 @ 11:39]  SCr 5.88 [04-06 @ 00:34]        Ferritin 318      [04-08-20 @ 07:44]  Lipid: chol --, , HDL --, LDL --      [04-08-20 @ 03:40]

## 2020-04-09 NOTE — PROGRESS NOTE ADULT - ASSESSMENT
Neuro: propofol 45/Fentanyl   - Withdraws to pain but does not follow commands  - Wean sedation as long as does not cause vent dyssynchrony    Pulm: 300/ 32/5/30%, O2 sat 97%  P/F 386 from 340 from 416; Pplat 19 from 8 from 16  - ARDS improving overall    CV: off pressors; will hold off on starting home antihypertensives as will likely need HD    GI: tolerating tube feeds  - Suplena with CS given MARIO stage 3 with high BUN not on CRRT (prefer low-protein formula)  - Increased to goal  - Pepcid  - Had BM yesterday    : oliguric stage 3 MARIO on CKD (unclear baseline Cr, last Cr in our system from 2018  - urine output improved with Bumex  - Phosphate binder for hyperphosphatemia  - Still has significant metabolic acidosis despite po bicarb. Will likely need RRT, and may tolerate HD, d/w Nephro team today    ID: Plaquenil dosing per current guideline (400 daily) completed; dose of Actemra given today  - Inflammatory markers: CRP, ferritin, and D-dimers are all increasing. Pt has already received Actemra so will give course of steroids. If still rising tomorrow, will consider adding Anakinra    Heme: Will change to HSQ 7500 q12 per new system guidelines for chemical VTE prophylaxis in COVID patients (CrCl < 20, BMI >30) . 66 yo female withHTN, DM, HLD,   presenting with SOB for one week. per patient's , patient had one week of SOB, fevers. Was saturating 70% at home, where patient used family member's oxygen, bringing up to 80-90%. today was 60% on RA so came to ED for further evaluation.     Neuro   - propofol @ 45 mcg/kg/min  - Precedex 0.9 mcg/ kg/ hr     CV  -off pressors   - off pressors   - metoprolol 5mg IVP q 6hrs   - hydralazine 10 mg IV q6 PRN MAP>100  -MAP 96      Pulmonary  - AC/VC 32/300/30/5  - pPeak25, pPlat 19  - ABG (4/7)  7.28/32/123/15/98%  - ABG (04/08) 7.28/31/116/14  -- TV/PBW =  6.07  - P/F =386    GI  - Suplena increased to goal @ 30cc/hr  -pepcid 20mg IVP x1   - Lactulose and senna       renal will start HD 4/9 (  ) needs access  metolazone 10mg VIA  OGT QD  &   BUMEX GTT AT 2 MG/HR    - Glass cath  -on sevelamer   - Cr   (04/08) 5.96  - Na bicarb for acidosis  - 04/07 STARTED Bumex 2mg/hr   - UOP 800CC/SHIFT     Heme   - HSQ daily  - H/H  stable (4/08) 7.5/23.7      ENDO  --162    ID  -4/7 ACTEMRA  400mg IVPB x 1 given (d/w Dr Foote ID)   - completed Plaquenil     Lines   {  } will need access for HD 4/9d  - L  IJ  triple lumen  -  L radial A-line 64 yo female withHTN, DM, HLD,   presenting with SOB for one week. per patient's , patient had one week of SOB, fevers. Was saturating 70% at home, where patient used family member's oxygen, bringing up to 80-90%. today was 60% on RA so came to ED for further evaluation.     Neuro   - propofol @ 45 mcg/kg/min  - Precedex 0.9 mcg/ kg/ hr     CV  -off pressors   - off pressors   - metoprolol 5mg IVP q 6hrs   - hydralazine 10 mg IV q6 PRN MAP>100  -MAP 96      Pulmonary  - AC/VC 32/300/30/5  - P/F =386    GI  - Suplena increased to goal @ 30cc/hr  -pepcid 20mg IVP x1   - Lactulose and senna       renal will start HD 4/9 - right IJ shiley placed   metolazone 10mg VIA  OGT QD  &   BUMEX GTT AT 2 MG/HR    - Glass cath  -on sevelamer   - Cr   (04/08) 5.96  - Na bicarb for acidosis  - 04/07 STARTED Bumex 2mg/hr   - UOP 800CC/SHIFT     Heme   - HSQ daily  - H/H  stable (4/08) 7.5/23.7      ENDO  --162    ID  -4/7 ACTEMRA  400mg IVPB x 1 given (d/w Dr Foote ID)   - completed Plaquenil

## 2020-04-09 NOTE — CHART NOTE - NSCHARTNOTEFT_GEN_A_CORE
I spoke to Eliseo GA patient's son in law and gave a full date including vent settings , cardiac medications, also on steroids. Patient to start CVVHD today family made aware.   I also called Mr Lacy at [phone] and gave an update on her status informed him of the dialysis to start to today. Questions answered and reassured I'd call tomorrow for another update.

## 2020-04-09 NOTE — PROGRESS NOTE ADULT - ATTENDING COMMENTS
Pt seen and discussed with ICU staff, agree with above.    Neuro: propofol 35/ precedex 1  - Withdraws to pain but does not follow commands  - Wean sedation as long as does not cause vent dyssynchrony    Pulm: 300/ 32/5/30%, O2 sat 95%  P/F 360 from 386 from 340 from 416; Pplat 19 from 19 from 8 from 16  - ARDS improving overall  - Hold off on SBT today since has significant metabolic acidosis; respiratory rate to correct acidemia is 32, so likely to fail. Will try once acidosis improved.    CV: off pressors; will hold off on starting home antihypertensives as will likely need HD    GI: tolerating tube feeds  - Suplena with CS given MARIO stage 3 with high BUN not on CRRT (prefer low-protein formula); once on HD, will switch to Nepro with CS  - Pepcid  - Had BM yesterday (4/8)    : oliguric stage 3 MARIO on CKD unknown baseline Cr although pt's family says she has kidney problems)  - Urine output acceptable with Bumex, but not clearing acidosis  - Phosphate binder for hyperphosphatemia  - HD today; d/w Nephro    ID: Plaquenil dosing per current guideline (400 daily) completed; dose of Actemra given yesterday   - PCT 0.75  - Inflammatory markers: CRP, ferritin, and D-dimers are all increasing. Pt has already received Actemra so will give course of steroids. If still rising, will consider adding Anakinra  - Will check tracheal aspirate (sputum) for COVID tomorrow    Heme: Continue HSQ 7500 q12 per new system guidelines for chemical VTE prophylaxis in COVID patients (CrCl < 20, BMI >30)    Endo: insulin gtt started overnight. Will calculate needs and start long-acting insulin per protocol

## 2020-04-09 NOTE — PROGRESS NOTE ADULT - SUBJECTIVE AND OBJECTIVE BOX
HISTORY  65y Female with COVID-19(+) pneumonia requiring intubation.    24 HOUR EVENTS:    SUBJECTIVE/ROS: Due to intubation with sedation, subjective information was not able to be obtained from the patient. History was obtained, to the extent possible, from review of the chart and collateral sources of information.      NEURO  Exam: sedated, no acute distress  Meds: acetaminophen    Suspension .. 650 milliGRAM(s) Enteral Tube every 6 hours PRN Temp greater or equal to 38C (100.4F)  dexMEDEtomidine Infusion 0.5 MICROgram(s)/kG/Hr IV Continuous <Continuous>  propofol Infusion 45 MICROgram(s)/kG/Min IV Continuous <Continuous>    Adequacy of sedation and pain control has been assessed and adjusted.      RESPIRATORY  RR: 21 (0 - 36)  SpO2: 96% (95% - 98%)  Mechanical Ventilation: Mode: AC/ CMV (Assist Control/ Continuous Mandatory Ventilation), RR (machine): 32, TV (machine): 300, FiO2: 30, PEEP: 5  PaO2 to FiO2 ratio: 386  Extubation readiness assessed.  ABG - ( 08 Apr 2020 03:41 )  pH: 7.28  /  pCO2: 31    /  pO2: 116   / HCO3: 14    / Base Excess: -11.2 /  SaO2: 98      Lactate: x                CARDIOVASCULAR  HR: 73 (69 - 80)  ABP: 160/64 (144/55 - 162/59)  ABP(mean): 102 (87 - 108)  Cardiac Rhythm: sinus  Most recent QTc:   Meds: amLODIPine   Tablet  buMETAnide Infusion (10 mL/Hr)  hydrALAZINE Injectable PRN  metolazone  metoprolol tartrate Injectable        GI/NUTRITION  Diet: Diet, NPO with Tube Feed:   Tube Feeding Modality: Orogastric  Suplena with Carb Steady (SUPLENA)  Total Volume for 24 Hours (mL): 720  Continuous  Starting Tube Feed Rate mL per Hour: 10  Increase Tube Feed Rate by (mL): 10     Every 4 hours  Until Goal Tube Feed Rate (mL per Hour): 30  Tube Feed Duration (in Hours): 24  Tube Feed Start Time: 13:31 (04-08-20 @ 11:16)    Most recent bowel movement:  Meds/stress ulcer prophylaxis: famotidine Injectable 20 milliGRAM(s) IV Push daily  lactulose Syrup 20 Gram(s) Oral daily  senna 2 Tablet(s) Oral at bedtime  sodium bicarbonate 1300 milliGRAM(s) Oral three times a day        GENITOURINARY  I&O's Detail    04-07 @ 07:01 - 04-08 @ 07:00  --------------------------------------------------------  IN:    bumetanide Infusion: 120 mL    dexmedetomidine Infusion: 540 mL    Enteral Tube Flush: 60 mL    furosemide Infusion: 50 mL    ns in tub fed  [id number]: 590 mL    propofol Infusion: 675 mL    Solution: 200 mL  Total IN: 2235 mL    OUT:    Indwelling Catheter - Urethral: 1540 mL  Total OUT: 1540 mL    Total NET: 695 mL      04-08 @ 07:01 - 04-09 @ 00:59  --------------------------------------------------------  IN:    bumetanide Infusion: 150 mL    dexmedetomidine Infusion: 337.5 mL    Enteral Tube Flush: 120 mL    ns in tub fed  [id number]: 330 mL    propofol Infusion: 342 mL  Total IN: 1279.5 mL    OUT:    Indwelling Catheter - Urethral: 1130 mL  Total OUT: 1130 mL    Total NET: 149.5 mL          04-08    132<L>  |  97  |  96<H>  ----------------------------<  136<H>  5.4<H>   |  13<L>  |  5.96<H>    Ca    7.5<L>      08 Apr 2020 03:40  Phos  8.7     04-08  Mg     2.1     04-08    TPro  6.0  /  Alb  1.9<L>  /  TBili  0.3  /  DBili  0.2  /  AST  33  /  ALT  35  /  AlkPhos  412<H>  04-08    [x] Glass catheter, indication: urine output monitoring in critically ill patient.  Meds: sodium bicarbonate 1300 milliGRAM(s) Oral three times a day    Creatine Kinase, Serum: 36 (04-08-20 @ 03:40)        HEMATOLOGIC  Meds: aspirin  chewable 81 milliGRAM(s) Enteral Tube daily  heparin  Injectable 7500 Unit(s) SubCutaneous every 12 hours    VTE Prophylaxis:                         7.5    8.65  )-----------( 233      ( 08 Apr 2020 03:40 )             23.7     PT/INR - ( 08 Apr 2020 03:40 )   PT: 17.4 sec;   INR: 1.51 ratio         PTT - ( 08 Apr 2020 03:40 )  PTT:72.5 sec  55879-88-22 @ 07:44        INFECTIOUS DISEASES  T(C): 36.3, Max: 37.6 (04-08-20 @ 03:26)  WBC Count: 8.65 (04-08-20 @ 03:40)  WBC Count: 8.26 (04-07-20 @ 01:25)    Recent Cultures:  Specimen Source: .Blood Blood-Peripheral, 04-03 @ 17:12; Results   No Growth Final; Gram Stain: --; Organism: --  Specimen Source: .Blood Blood-Peripheral, 04-03 @ 17:11; Results   No Growth Final; Gram Stain: --; Organism: --    Meds:   Procalcitonin, Serum: 0.94 ng/mL (04-08-20 @ 03:40)        ENDOCRINE  199  175  168  155    Meds: atorvastatin 20 milliGRAM(s) Oral at bedtime  insulin lispro (HumaLOG) corrective regimen sliding scale   SubCutaneous every 6 hours  levothyroxine Injectable 100 MICROGram(s) IV Push at bedtime  methylPREDNISolone sodium succinate Injectable 50 milliGRAM(s) IV Push every 8 hours        ACCESS DEVICES:  [ ] Peripheral IV  [x] Central Venous Line	[ ] R	[x ] L	[x ] IJ	[ ] Fem	[ ] SC	Placed:   [x] Arterial Line		[ ] R	[x ] L	[ ] Fem	[x ] Rad	[ ] Ax	Placed:   [x ] Shiley HD Access             [x ] R [ ] L [x ] IJ  [ ] Fem     Placed: 4/8/20  [x] Urinary Catheter, Date Placed:   Necessity of urinary, arterial, and venous catheters discussed.      CODE STATUS:     IMAGING:

## 2020-04-09 NOTE — PROGRESS NOTE ADULT - ASSESSMENT
65F PMHx DM2, HTN, HLD who present to ED for fever/dyspnea x 1 week.  Per HPI, pt desaturated at home O2 sat 70% which improved nasal cannula (another family member).  In ED hypoxia 70s on nasal cannula s/p intubation.  Covid positive with CXR b/l opacities. Given ceftriaxone, azithromycin, hydroxychlorquine.     Nephrology consulted for MARIO or MARIO on CKD.       MARIO    likely 2/2 ATN in setting septic shock Covid19 infection, low BP  on admission sCr 3.93 on 4/3/2020, has been uptrending since.  Exact duration of elevated Scr however unknown, no prior labs available for review  - maintaining UO, however with worsened BUN today, plan for HD today  - Send UA, urine electrolytes, spot urine TP/CR. Agree with Glass catheter placement.  obtain renal U/S when feasible.  - Monitor labs and urine output. Avoid NSAIDs, ACEI/ARBS, RCA and nephrotoxins. Dose medications as per eGFR.        Metabolic acidosis   likely 2/2 MARIO ATN in setting septic shock covid19  on sodium bicarb 1300 PO TID  plan for HD today  monitor daily    Hyperphosphatemia  in the setting of MARIO   switch renvela to calcium acetate 2 tabs TID with meals  Check intact PTH level. Monitor serum phosphorus    Hypocalcemia   in the setting of hyperphosphatemia and hypoalbuminemia  - monitor ionized calcium, replete to keep >1.1

## 2020-04-10 LAB
4/8 RATIO: 2.66 RATIO — SIGNIFICANT CHANGE UP (ref 0.9–3.6)
A-TUMOR NECROSIS FACT SERPL-MCNC: <5 PG/ML — SIGNIFICANT CHANGE UP
ABS CD8: 41 /UL — LOW (ref 142–740)
ALBUMIN SERPL ELPH-MCNC: 1.9 G/DL — LOW (ref 3.3–5)
ALBUMIN SERPL ELPH-MCNC: 2.1 G/DL — LOW (ref 3.3–5)
ALP SERPL-CCNC: 615 U/L — HIGH (ref 40–120)
ALP SERPL-CCNC: 675 U/L — HIGH (ref 40–120)
ALT FLD-CCNC: 40 U/L — SIGNIFICANT CHANGE UP (ref 10–45)
ALT FLD-CCNC: 49 U/L — HIGH (ref 10–45)
ANION GAP SERPL CALC-SCNC: 20 MMOL/L — HIGH (ref 5–17)
ANION GAP SERPL CALC-SCNC: 21 MMOL/L — HIGH (ref 5–17)
APTT BLD: 33.9 SEC — SIGNIFICANT CHANGE UP (ref 27.5–36.3)
AST SERPL-CCNC: 43 U/L — HIGH (ref 10–40)
AST SERPL-CCNC: 67 U/L — HIGH (ref 10–40)
BASE EXCESS BLDA CALC-SCNC: -5.3 MMOL/L — LOW (ref -2–2)
BASOPHILS # BLD AUTO: 0.01 K/UL — SIGNIFICANT CHANGE UP (ref 0–0.2)
BASOPHILS NFR BLD AUTO: 0.1 % — SIGNIFICANT CHANGE UP (ref 0–2)
BILIRUB SERPL-MCNC: 0.2 MG/DL — SIGNIFICANT CHANGE UP (ref 0.2–1.2)
BILIRUB SERPL-MCNC: 0.2 MG/DL — SIGNIFICANT CHANGE UP (ref 0.2–1.2)
BUN SERPL-MCNC: 78 MG/DL — HIGH (ref 7–23)
BUN SERPL-MCNC: 86 MG/DL — HIGH (ref 7–23)
CALCIUM SERPL-MCNC: 8.2 MG/DL — LOW (ref 8.4–10.5)
CALCIUM SERPL-MCNC: 8.3 MG/DL — LOW (ref 8.4–10.5)
CD3 BLASTS SPEC-ACNC: 150 /UL — LOW (ref 672–1870)
CD3 BLASTS SPEC-ACNC: 65 % — SIGNIFICANT CHANGE UP (ref 59–83)
CD4 %: 48 % — SIGNIFICANT CHANGE UP (ref 30–62)
CD8 %: 18 % — SIGNIFICANT CHANGE UP (ref 12–36)
CHLORIDE SERPL-SCNC: 94 MMOL/L — LOW (ref 96–108)
CHLORIDE SERPL-SCNC: 95 MMOL/L — LOW (ref 96–108)
CK SERPL-CCNC: 50 U/L — SIGNIFICANT CHANGE UP (ref 25–170)
CO2 BLDA-SCNC: 20 MMOL/L — LOW (ref 22–30)
CO2 SERPL-SCNC: 17 MMOL/L — LOW (ref 22–31)
CO2 SERPL-SCNC: 17 MMOL/L — LOW (ref 22–31)
CREAT SERPL-MCNC: 4.46 MG/DL — HIGH (ref 0.5–1.3)
CREAT SERPL-MCNC: 4.63 MG/DL — HIGH (ref 0.5–1.3)
CRP SERPL-MCNC: 7.92 MG/DL — HIGH (ref 0–0.4)
D DIMER BLD IA.RAPID-MCNC: 1859 NG/ML DDU — HIGH
EOSINOPHIL # BLD AUTO: 0 K/UL — SIGNIFICANT CHANGE UP (ref 0–0.5)
EOSINOPHIL NFR BLD AUTO: 0 % — SIGNIFICANT CHANGE UP (ref 0–6)
FERRITIN SERPL-MCNC: 506 NG/ML — HIGH (ref 15–150)
GAS PNL BLDA: SIGNIFICANT CHANGE UP
GAS PNL BLDA: SIGNIFICANT CHANGE UP
GLUCOSE BLDC GLUCOMTR-MCNC: 111 MG/DL — HIGH (ref 70–99)
GLUCOSE BLDC GLUCOMTR-MCNC: 112 MG/DL — HIGH (ref 70–99)
GLUCOSE BLDC GLUCOMTR-MCNC: 119 MG/DL — HIGH (ref 70–99)
GLUCOSE BLDC GLUCOMTR-MCNC: 128 MG/DL — HIGH (ref 70–99)
GLUCOSE BLDC GLUCOMTR-MCNC: 149 MG/DL — HIGH (ref 70–99)
GLUCOSE BLDC GLUCOMTR-MCNC: 150 MG/DL — HIGH (ref 70–99)
GLUCOSE BLDC GLUCOMTR-MCNC: 159 MG/DL — HIGH (ref 70–99)
GLUCOSE BLDC GLUCOMTR-MCNC: 161 MG/DL — HIGH (ref 70–99)
GLUCOSE BLDC GLUCOMTR-MCNC: 177 MG/DL — HIGH (ref 70–99)
GLUCOSE BLDC GLUCOMTR-MCNC: 181 MG/DL — HIGH (ref 70–99)
GLUCOSE BLDC GLUCOMTR-MCNC: 181 MG/DL — HIGH (ref 70–99)
GLUCOSE BLDC GLUCOMTR-MCNC: 182 MG/DL — HIGH (ref 70–99)
GLUCOSE BLDC GLUCOMTR-MCNC: 185 MG/DL — HIGH (ref 70–99)
GLUCOSE SERPL-MCNC: 129 MG/DL — HIGH (ref 70–99)
GLUCOSE SERPL-MCNC: 183 MG/DL — HIGH (ref 70–99)
HBV CORE AB SER-ACNC: SIGNIFICANT CHANGE UP
HBV CORE IGM SER-ACNC: SIGNIFICANT CHANGE UP
HBV SURFACE AB SER-ACNC: <3 MIU/ML — LOW
HBV SURFACE AG SER-ACNC: SIGNIFICANT CHANGE UP
HCO3 BLDA-SCNC: 19 MMOL/L — LOW (ref 21–29)
HCT VFR BLD CALC: 25.3 % — LOW (ref 34.5–45)
HCV AB S/CO SERPL IA: 0.14 S/CO — SIGNIFICANT CHANGE UP (ref 0–0.99)
HCV AB SERPL-IMP: SIGNIFICANT CHANGE UP
HGB BLD-MCNC: 8.1 G/DL — LOW (ref 11.5–15.5)
HOROWITZ INDEX BLDA+IHG-RTO: 30 — SIGNIFICANT CHANGE UP
IL10 SERPL-MCNC: 27 PG/ML — HIGH
IL12 SERPL-MCNC: <5 PG/ML — SIGNIFICANT CHANGE UP
IL13 SERPL-MCNC: <5 PG/ML — SIGNIFICANT CHANGE UP
IL2 SERPL-MCNC: 5100 PG/ML — HIGH
IL2 SERPL-MCNC: <5 PG/ML — SIGNIFICANT CHANGE UP
IL4 SERPL-MCNC: <5 PG/ML — SIGNIFICANT CHANGE UP
IL6 SERPL-MCNC: 16 PG/ML — HIGH
IL8 SERPL-MCNC: <5 PG/ML — SIGNIFICANT CHANGE UP
IMM GRANULOCYTES NFR BLD AUTO: 4.3 % — HIGH (ref 0–1.5)
INR BLD: 1.08 RATIO — SIGNIFICANT CHANGE UP (ref 0.88–1.16)
INTERFERON GAMMA: <5 PG/ML — SIGNIFICANT CHANGE UP
INTERLEUKIN 1 BETA: <5 PG/ML — SIGNIFICANT CHANGE UP
INTERLEUKIN 17: <5 PG/ML — SIGNIFICANT CHANGE UP
INTERLEUKIN 5: <5 PG/ML — SIGNIFICANT CHANGE UP
LDH SERPL L TO P-CCNC: 264 U/L — HIGH (ref 50–242)
LYMPHOCYTES # BLD AUTO: 0.25 K/UL — LOW (ref 1–3.3)
LYMPHOCYTES # BLD AUTO: 3.7 % — LOW (ref 13–44)
MAGNESIUM SERPL-MCNC: 2.1 MG/DL — SIGNIFICANT CHANGE UP (ref 1.6–2.6)
MAGNESIUM SERPL-MCNC: 2.1 MG/DL — SIGNIFICANT CHANGE UP (ref 1.6–2.6)
MCHC RBC-ENTMCNC: 25.3 PG — LOW (ref 27–34)
MCHC RBC-ENTMCNC: 32 GM/DL — SIGNIFICANT CHANGE UP (ref 32–36)
MCV RBC AUTO: 79.1 FL — LOW (ref 80–100)
MONOCYTES # BLD AUTO: 0.47 K/UL — SIGNIFICANT CHANGE UP (ref 0–0.9)
MONOCYTES NFR BLD AUTO: 6.9 % — SIGNIFICANT CHANGE UP (ref 2–14)
NEUTROPHILS # BLD AUTO: 5.79 K/UL — SIGNIFICANT CHANGE UP (ref 1.8–7.4)
NEUTROPHILS NFR BLD AUTO: 85 % — HIGH (ref 43–77)
NRBC # BLD: 0 /100 WBCS — SIGNIFICANT CHANGE UP (ref 0–0)
PCO2 BLDA: 36 MMHG — SIGNIFICANT CHANGE UP (ref 32–46)
PH BLDA: 7.34 — LOW (ref 7.35–7.45)
PHOSPHATE SERPL-MCNC: 7.9 MG/DL — HIGH (ref 2.5–4.5)
PHOSPHATE SERPL-MCNC: 8.6 MG/DL — HIGH (ref 2.5–4.5)
PLATELET # BLD AUTO: 282 K/UL — SIGNIFICANT CHANGE UP (ref 150–400)
PO2 BLDA: 110 MMHG — HIGH (ref 74–108)
POTASSIUM SERPL-MCNC: 4.5 MMOL/L — SIGNIFICANT CHANGE UP (ref 3.5–5.3)
POTASSIUM SERPL-MCNC: 4.6 MMOL/L — SIGNIFICANT CHANGE UP (ref 3.5–5.3)
POTASSIUM SERPL-SCNC: 4.5 MMOL/L — SIGNIFICANT CHANGE UP (ref 3.5–5.3)
POTASSIUM SERPL-SCNC: 4.6 MMOL/L — SIGNIFICANT CHANGE UP (ref 3.5–5.3)
PROCALCITONIN SERPL-MCNC: 0.71 NG/ML — HIGH (ref 0.02–0.1)
PROT SERPL-MCNC: 5.7 G/DL — LOW (ref 6–8.3)
PROT SERPL-MCNC: 6 G/DL — SIGNIFICANT CHANGE UP (ref 6–8.3)
PROTHROM AB SERPL-ACNC: 12.3 SEC — SIGNIFICANT CHANGE UP (ref 10–12.9)
RBC # BLD: 3.2 M/UL — LOW (ref 3.8–5.2)
RBC # FLD: 13.3 % — SIGNIFICANT CHANGE UP (ref 10.3–14.5)
SAO2 % BLDA: 98 % — HIGH (ref 92–96)
SODIUM SERPL-SCNC: 132 MMOL/L — LOW (ref 135–145)
SODIUM SERPL-SCNC: 132 MMOL/L — LOW (ref 135–145)
T-CELL CD4 SUBSET PNL BLD: 110 /UL — LOW (ref 489–1457)
TRIGL SERPL-MCNC: 157 MG/DL — HIGH (ref 10–149)
TROPONIN T, HIGH SENSITIVITY RESULT: 97 NG/L — HIGH (ref 0–51)
VANCOMYCIN FLD-MCNC: <4 UG/ML — SIGNIFICANT CHANGE UP
WBC # BLD: 6.81 K/UL — SIGNIFICANT CHANGE UP (ref 3.8–10.5)
WBC # FLD AUTO: 6.81 K/UL — SIGNIFICANT CHANGE UP (ref 3.8–10.5)

## 2020-04-10 PROCEDURE — 99291 CRITICAL CARE FIRST HOUR: CPT

## 2020-04-10 PROCEDURE — 99233 SBSQ HOSP IP/OBS HIGH 50: CPT

## 2020-04-10 RX ORDER — HYDRALAZINE HCL 50 MG
10 TABLET ORAL ONCE
Refills: 0 | Status: COMPLETED | OUTPATIENT
Start: 2020-04-10 | End: 2020-04-10

## 2020-04-10 RX ORDER — BUMETANIDE 0.25 MG/ML
2 INJECTION INTRAMUSCULAR; INTRAVENOUS
Qty: 20 | Refills: 0 | Status: DISCONTINUED | OUTPATIENT
Start: 2020-04-10 | End: 2020-04-12

## 2020-04-10 RX ORDER — CALCIUM ACETATE 667 MG
667 TABLET ORAL
Refills: 0 | Status: DISCONTINUED | OUTPATIENT
Start: 2020-04-10 | End: 2020-04-10

## 2020-04-10 RX ORDER — CALCIUM ACETATE 667 MG
1334 TABLET ORAL
Refills: 0 | Status: DISCONTINUED | OUTPATIENT
Start: 2020-04-10 | End: 2020-04-14

## 2020-04-10 RX ORDER — ANAKINRA 100MG/0.67
100 SYRINGE (ML) SUBCUTANEOUS
Refills: 0 | Status: COMPLETED | OUTPATIENT
Start: 2020-04-10 | End: 2020-04-13

## 2020-04-10 RX ORDER — HYDRALAZINE HCL 50 MG
10 TABLET ORAL EVERY 6 HOURS
Refills: 0 | Status: DISCONTINUED | OUTPATIENT
Start: 2020-04-10 | End: 2020-04-12

## 2020-04-10 RX ORDER — ANAKINRA 100MG/0.67
100 SYRINGE (ML) SUBCUTANEOUS DAILY
Refills: 0 | Status: DISCONTINUED | OUTPATIENT
Start: 2020-04-10 | End: 2020-04-10

## 2020-04-10 RX ORDER — HYDRALAZINE HCL 50 MG
50 TABLET ORAL THREE TIMES A DAY
Refills: 0 | Status: DISCONTINUED | OUTPATIENT
Start: 2020-04-10 | End: 2020-04-12

## 2020-04-10 RX ADMIN — Medication 1 DROP(S): at 05:34

## 2020-04-10 RX ADMIN — Medication 50 MILLIGRAM(S): at 12:16

## 2020-04-10 RX ADMIN — LACTULOSE 20 GRAM(S): 10 SOLUTION ORAL at 10:12

## 2020-04-10 RX ADMIN — Medication 100 MILLIGRAM(S): at 18:10

## 2020-04-10 RX ADMIN — Medication 81 MILLIGRAM(S): at 10:11

## 2020-04-10 RX ADMIN — Medication 1300 MILLIGRAM(S): at 06:49

## 2020-04-10 RX ADMIN — Medication 50 MILLIGRAM(S): at 06:49

## 2020-04-10 RX ADMIN — Medication 50 MILLIGRAM(S): at 12:17

## 2020-04-10 RX ADMIN — Medication 10 MILLIGRAM(S): at 00:16

## 2020-04-10 RX ADMIN — AMLODIPINE BESYLATE 10 MILLIGRAM(S): 2.5 TABLET ORAL at 05:33

## 2020-04-10 RX ADMIN — Medication 1334 MILLIGRAM(S): at 18:01

## 2020-04-10 RX ADMIN — FAMOTIDINE 20 MILLIGRAM(S): 10 INJECTION INTRAVENOUS at 10:11

## 2020-04-10 RX ADMIN — SEVELAMER CARBONATE 1600 MILLIGRAM(S): 2400 POWDER, FOR SUSPENSION ORAL at 11:00

## 2020-04-10 RX ADMIN — Medication 10 MILLIGRAM(S): at 01:48

## 2020-04-10 RX ADMIN — SEVELAMER CARBONATE 1600 MILLIGRAM(S): 2400 POWDER, FOR SUSPENSION ORAL at 09:12

## 2020-04-10 RX ADMIN — Medication 1 DROP(S): at 18:10

## 2020-04-10 RX ADMIN — CHLORHEXIDINE GLUCONATE 15 MILLILITER(S): 213 SOLUTION TOPICAL at 18:10

## 2020-04-10 RX ADMIN — Medication 1300 MILLIGRAM(S): at 12:16

## 2020-04-10 RX ADMIN — CHLORHEXIDINE GLUCONATE 15 MILLILITER(S): 213 SOLUTION TOPICAL at 05:34

## 2020-04-10 RX ADMIN — HEPARIN SODIUM 7500 UNIT(S): 5000 INJECTION INTRAVENOUS; SUBCUTANEOUS at 18:10

## 2020-04-10 RX ADMIN — ATORVASTATIN CALCIUM 20 MILLIGRAM(S): 80 TABLET, FILM COATED ORAL at 00:11

## 2020-04-10 RX ADMIN — HEPARIN SODIUM 7500 UNIT(S): 5000 INJECTION INTRAVENOUS; SUBCUTANEOUS at 05:34

## 2020-04-10 NOTE — PROGRESS NOTE ADULT - SUBJECTIVE AND OBJECTIVE BOX
[facility] DIVISION OF KIDNEY DISEASES AND HYPERTENSION -- PROGRESS NOTE    Chief complaint: MARIO    24 hour events/subjective:  s/p HD yesterday      PAST HISTORY  --------------------------------------------------------------------------------  No significant changes to PMH, PSH, FHx, SHx, unless otherwise noted    ALLERGIES & MEDICATIONS  --------------------------------------------------------------------------------  Allergies    No Known Allergies    Intolerances      Standing Inpatient Medications  amLODIPine   Tablet 10 milliGRAM(s) Oral daily  artificial  tears Solution 1 Drop(s) Both EYES two times a day  aspirin  chewable 81 milliGRAM(s) Enteral Tube daily  atorvastatin 20 milliGRAM(s) Oral at bedtime  buMETAnide Infusion 2 mG/Hr IV Continuous <Continuous>  chlorhexidine 0.12% Liquid 15 milliLiter(s) Oral Mucosa every 12 hours  dexMEDEtomidine Infusion 0.5 MICROgram(s)/kG/Hr IV Continuous <Continuous>  famotidine Injectable 20 milliGRAM(s) IV Push daily  heparin  Injectable 7500 Unit(s) SubCutaneous every 12 hours  insulin regular Infusion 2 Unit(s)/Hr IV Continuous <Continuous>  lactulose Syrup 20 Gram(s) Oral daily  levothyroxine Injectable 100 MICROGram(s) IV Push at bedtime  methylPREDNISolone sodium succinate Injectable 50 milliGRAM(s) IV Push every 8 hours  metolazone 10 milliGRAM(s) Oral daily  metoprolol tartrate Injectable 5 milliGRAM(s) IV Push every 6 hours  propofol Infusion 45 MICROgram(s)/kG/Min IV Continuous <Continuous>  senna 2 Tablet(s) Oral at bedtime  sevelamer carbonate Powder 1600 milliGRAM(s) Oral three times a day with meals  sodium bicarbonate 1300 milliGRAM(s) Oral three times a day    PRN Inpatient Medications  acetaminophen    Suspension .. 650 milliGRAM(s) Enteral Tube every 6 hours PRN  hydrALAZINE Injectable 10 milliGRAM(s) IV Push every 6 hours PRN      REVIEW OF SYSTEMS  --------------------------------------------------------------------------------  Constitutional: [ ] Fever [ ] Chills [ ] Fatigue [ ] Weight change   HEENT: [ ] Blurred vision [ ] Eye Pain [ ] Headache [ ] Runny nose [ ] Sore Throat   Respiratory: [ ] Cough [ ] Wheezing [ ] Shortness of breath  Cardiovascular: [ ] Chest Pain [ ] Palpitations [ ] LOPEZ [ ] PND [ ] Orthopnea  Gastrointestinal: [ ] Abdominal Pain [ ] Diarrhea [ ] Constipation [ ] Hemorrhoids [ ] Nausea [ ] Vomiting  Genitourinary: [ ] Nocturia [ ] Dysuria [ ] Incontinence  Extremities: [ ] Swelling [ ] Joint Pain  Neurologic: [ ] Focal deficit [ ] Paresthesias [ ] Syncope  Lymphatic: [ ] Swelling [ ] Lymphadenopathy   Skin: [ ] Rash [ ] Ecchymoses [ ] Wounds [ ] Lesions  Psychiatry: [ ] Depression [ ] Suicidal/Homicidal Ideation [ ] Anxiety [ ] Sleep Disturbances  [ ] 10 point review of systems is otherwise negative except as mentioned above              [x ]Unable to obtain due to patient intubated  All other systems were reviewed and are negative, except as noted.    VITALS/PHYSICAL EXAM  --------------------------------------------------------------------------------  T(C): 35.9 (04-10-20 @ 00:00), Max: 40 (04-10-20 @ 00:00)  HR: 52 (04-10-20 @ 06:00) (51 - 89)  BP: --  RR: 31 (04-10-20 @ 00:00) (20 - 34)  SpO2: 97% (04-10-20 @ 06:00) (94% - 97%)  Wt(kg): --        04-08-20 @ 07:01  -  04-09-20 @ 07:00  --------------------------------------------------------  IN: 1923.5 mL / OUT: 2380 mL / NET: -456.5 mL    04-09-20 @ 07:01  -  04-10-20 @ 06:55  --------------------------------------------------------  IN: 2773.5 mL / OUT: 4495 mL / NET: -1721.5 mL      LABS/STUDIES  --------------------------------------------------------------------------------              8.1    6.81  >-----------<  282      [04-10-20 @ 02:24]              25.3     132  |  95  |  78  ----------------------------<  183      [04-10-20 @ 02:24]  4.5   |  17  |  4.46        Ca     8.3     [04-10-20 @ 02:24]      Mg     2.1     [04-10-20 @ 02:24]      Phos  7.9     [04-10-20 @ 02:24]    TPro  6.0  /  Alb  1.9  /  TBili  0.2  /  DBili  0.2  /  AST  43  /  ALT  40  /  AlkPhos  615  [04-10-20 @ 02:24]    PT/INR: PT 12.3 , INR 1.08       [04-10-20 @ 02:24]  PTT: 33.9       [04-10-20 @ 02:24]    CK 50      [04-10-20 @ 02:24]        [04-10-20 @ 02:24]    Creatinine Trend:  SCr 4.46 [04-10 @ 02:24]  SCr 5.85 [04-09 @ 17:32]  SCr 6.02 [04-09 @ 04:59]  SCr 5.96 [04-08 @ 03:40]  SCr 6.05 [04-07 @ 01:25]        Ferritin 318      [04-08-20 @ 07:44]  Lipid: chol --, , HDL --, LDL --      [04-10-20 @ 02:24]    HBsAb <3.0      [04-10-20 @ 00:19]  HBsAg Nonreact      [04-10-20 @ 00:19]  HBcAb Nonreact      [04-10-20 @ 00:19]  HCV 0.14, Nonreact      [04-10-20 @ 00:19]

## 2020-04-10 NOTE — CHART NOTE - NSCHARTNOTEFT_GEN_A_CORE
Nutrition Follow Up Note   Patient seen for: nutrition follow up on COVID ICU     Source: medical record, communication with team. Unable to speak to pt due to current airborne isolation contact precautions related to COVID-19. Pt remains intubated.     Chart reviewed, events noted. 64 yo female with HTN, DM, HLD, who presented with SOB and was found to be COVID+, required intubation and mechanical ventilation    Diet Order: Diet, NPO with Tube Feed:   Tube Feeding Modality: Orogastric  Nepro with Carb Steady (NEPRORTH)  Total Volume for 24 Hours (mL): 720  Continuous  Starting Tube Feed Rate {mL per Hour}: 10  Increase Tube Feed Rate by (mL): 10     Every 4 hours  Until Goal Tube Feed Rate (mL per Hour): 30  Tube Feed Duration (in Hours): 24  Tube Feed Start Time: 21:00 (04-09-20 @ 20:53)    CURRENT EN ORDER PROVIDES: 720 ml formula, 1296 calories (13 marisol/Kg), 58 grams protein (1.3 Gm/Kg) - meets calorie needs (with calories from Propofol); does not meet increased protein needs for HD    Nutrition Events:   - EN: tube feeding changed (4/9) to Nepro @ 30ml/hr x 24 hours with initiation of HD on 4/9  - Propofol: current rate 24ml/hr will provide 634 calories in 24-hours  - Oliguric stage 3 MARIO on CKD  - HD initiated 4/9 (-1300ml)  - Hyperphosphatemia noted; addressed with Sevelamer Rx and renal EN formula  - Hyperglycemia being addressed with insulin gtt; plan to start Lantus, per chart    GI: last BM 4/9. Bowel regimen: lactulose, senna.    Anthrpometric Measurements:   Height (cm): 152.4 (04-04-20 @ 04:42)  Weight (kg): 100 (04-03-20 @ 14:20)  BMI (kg/m2): 43.1 (04-04-20 @ 04:42)  IBW: 45.5Kg    Medications: MEDICATIONS  (STANDING):  amLODIPine   Tablet 10 milliGRAM(s) Oral daily  artificial  tears Solution 1 Drop(s) Both EYES two times a day  aspirin  chewable 81 milliGRAM(s) Enteral Tube daily  atorvastatin 20 milliGRAM(s) Oral at bedtime  buMETAnide Infusion 2 mG/Hr (10 mL/Hr) IV Continuous <Continuous>  chlorhexidine 0.12% Liquid 15 milliLiter(s) Oral Mucosa every 12 hours  dexMEDEtomidine Infusion 0.5 MICROgram(s)/kG/Hr (12.5 mL/Hr) IV Continuous <Continuous>  famotidine Injectable 20 milliGRAM(s) IV Push daily  heparin  Injectable 7500 Unit(s) SubCutaneous every 12 hours  insulin regular Infusion 2 Unit(s)/Hr (2 mL/Hr) IV Continuous <Continuous>  lactulose Syrup 20 Gram(s) Oral daily  levothyroxine Injectable 100 MICROGram(s) IV Push at bedtime  methylPREDNISolone sodium succinate Injectable 50 milliGRAM(s) IV Push every 8 hours  metolazone 10 milliGRAM(s) Oral daily  metoprolol tartrate Injectable 5 milliGRAM(s) IV Push every 6 hours  propofol Infusion 45 MICROgram(s)/kG/Min (27 mL/Hr) IV Continuous <Continuous>  senna 2 Tablet(s) Oral at bedtime  sevelamer carbonate Powder 1600 milliGRAM(s) Oral three times a day with meals  sodium bicarbonate 1300 milliGRAM(s) Oral three times a day    MEDICATIONS  (PRN):  acetaminophen    Suspension .. 650 milliGRAM(s) Enteral Tube every 6 hours PRN Temp greater or equal to 38C (100.4F)  hydrALAZINE Injectable 10 milliGRAM(s) IV Push every 6 hours PRN MAP > 100    Labs: 04-10 @ 02:24: Sodium 132<L>, Potassium 4.5, Calcium 8.3<L>, Magnesium 2.1, Phosphorus 7.9<H>, BUN 78<H>, Creatinine 4.46<H>, Glucose 183<H>, Alk Phos 615<H>, ALT/SGPT 40, AST/SGOT 43<H>, Albumin 1.9<L>, Total Bilirubin 0.2, Hemoglobin 8.1<L>, Hematocrit 25.3<L>, Creatine Kinase <<27>    Triglycerides, Serum: 157 mg/dL (04-10-20 @ 02:24)  Triglycerides, Serum: 142 mg/dL (04-08-20 @ 03:40)    POCT Blood Glucose.: 177 mg/dL (04-10-20 @ 06:12)  POCT Blood Glucose.: 185 mg/dL (04-10-20 @ 04:15)  POCT Blood Glucose.: 182 mg/dL (04-10-20 @ 01:55)  POCT Blood Glucose.: 181 mg/dL (04-09-20 @ 23:56)  POCT Blood Glucose.: 154 mg/dL (04-09-20 @ 22:15)  POCT Blood Glucose.: 255 mg/dL (04-09-20 @ 20:21)  POCT Blood Glucose.: 255 mg/dL (04-09-20 @ 17:54)  POCT Blood Glucose.: 264 mg/dL (04-09-20 @ 16:15)  POCT Blood Glucose.: 273 mg/dL (04-09-20 @ 13:46)  POCT Blood Glucose.: 306 mg/dL (04-09-20 @ 11:28)    Skin: no pressure injuries documented  Edema: 3+ dependent    Estimated Needs: with consideration for BMI>40, intubation, and HD  Energy: 7259-9479 marisol/day (11-14cal/Kg per dosing wt 100Kg)  Protein: 73-82 Gm/day (1.6-1.8 Gm/Kg per IBW 45.5Kg)  [place] Equation (REE): 1668 calories (17 marisol/Kg)    Previous Nutrition Diagnosis: Overweight/Obesity  Nutrition Diagnosis is: ongoing, being addressed with hypocaloric EN regimen    New Nutrition Diagnosis:  none    Recommended Interventions:   1. Continue Nepro @ 30 mL/hr x 24 hrs to provide 720 ml formula, 1296 calories (13 marisol/Kg), 58 grams protein (1.3 Gm/Kg).  2. If Bolus EN is required: Increase Bolus EN rate by 50ml/day to goal: Nepro @ 180ml q6 hrs  3. If HD to continue, add 2 Prosource (120cal, 30Gm protein) for a total of 88 grams protein (1.9 Gm/Kg)     Monitoring and Evaluation:   Continue to monitor nutrition provision and tolerance, weights, labs, skin integrity.   RD remains available upon request and will follow up per protocol.    Wendy Belcher, MS RD CDN [facility]; Pager # [phone] Nutrition Follow Up Note   Patient seen for: nutrition follow up on COVID ICU     Source: medical record, communication with team. Unable to speak to pt due to current airborne isolation contact precautions related to COVID-19. Pt remains intubated.     Chart reviewed, events noted. 66 yo female with HTN, DM, HLD, who presented with SOB and was found to be COVID+, required intubation and mechanical ventilation    Diet Order: Diet, NPO with Tube Feed:   Tube Feeding Modality: Orogastric  Nepro with Carb Steady (NEPRORTH)  Total Volume for 24 Hours (mL): 720  Continuous  Starting Tube Feed Rate {mL per Hour}: 10  Increase Tube Feed Rate by (mL): 10     Every 4 hours  Until Goal Tube Feed Rate (mL per Hour): 30  Tube Feed Duration (in Hours): 24  Tube Feed Start Time: 21:00 (04-09-20 @ 20:53)    CURRENT EN ORDER PROVIDES: 720 ml formula, 1296 calories (13 marisol/Kg dosing wt 100Kg), 58 grams protein (1.3 Gm/Kg IBW 45.5Kg) - meets calorie needs (with calories from Propofol); does not meet increased protein needs for HD    Nutrition Events:   - EN: tube feeding changed (4/9) to Nepro @ 30ml/hr x 24 hours with initiation of HD on 4/9  - Propofol: current rate 24ml/hr will provide 634 calories in 24-hours  - Oliguric stage 3 MARIO on CKD  - HD initiated 4/9 (-1300ml)  - Hyperphosphatemia noted; addressed with Sevelamer Rx and renal EN formula  - Hyperglycemia being addressed with insulin gtt; plan to start Lantus, per chart    GI: last BM 4/9. Bowel regimen: lactulose, senna.    Anthrpometric Measurements:   Height (cm): 152.4 (04-04-20 @ 04:42)  Weight (kg): 100 (04-03-20 @ 14:20)  BMI (kg/m2): 43.1 (04-04-20 @ 04:42)  IBW: 45.5Kg    Medications: MEDICATIONS  (STANDING):  amLODIPine   Tablet 10 milliGRAM(s) Oral daily  artificial  tears Solution 1 Drop(s) Both EYES two times a day  aspirin  chewable 81 milliGRAM(s) Enteral Tube daily  atorvastatin 20 milliGRAM(s) Oral at bedtime  buMETAnide Infusion 2 mG/Hr (10 mL/Hr) IV Continuous <Continuous>  chlorhexidine 0.12% Liquid 15 milliLiter(s) Oral Mucosa every 12 hours  dexMEDEtomidine Infusion 0.5 MICROgram(s)/kG/Hr (12.5 mL/Hr) IV Continuous <Continuous>  famotidine Injectable 20 milliGRAM(s) IV Push daily  heparin  Injectable 7500 Unit(s) SubCutaneous every 12 hours  insulin regular Infusion 2 Unit(s)/Hr (2 mL/Hr) IV Continuous <Continuous>  lactulose Syrup 20 Gram(s) Oral daily  levothyroxine Injectable 100 MICROGram(s) IV Push at bedtime  methylPREDNISolone sodium succinate Injectable 50 milliGRAM(s) IV Push every 8 hours  metolazone 10 milliGRAM(s) Oral daily  metoprolol tartrate Injectable 5 milliGRAM(s) IV Push every 6 hours  propofol Infusion 45 MICROgram(s)/kG/Min (27 mL/Hr) IV Continuous <Continuous>  senna 2 Tablet(s) Oral at bedtime  sevelamer carbonate Powder 1600 milliGRAM(s) Oral three times a day with meals  sodium bicarbonate 1300 milliGRAM(s) Oral three times a day    MEDICATIONS  (PRN):  acetaminophen    Suspension .. 650 milliGRAM(s) Enteral Tube every 6 hours PRN Temp greater or equal to 38C (100.4F)  hydrALAZINE Injectable 10 milliGRAM(s) IV Push every 6 hours PRN MAP > 100    Labs: 04-10 @ 02:24: Sodium 132<L>, Potassium 4.5, Calcium 8.3<L>, Magnesium 2.1, Phosphorus 7.9<H>, BUN 78<H>, Creatinine 4.46<H>, Glucose 183<H>, Alk Phos 615<H>, ALT/SGPT 40, AST/SGOT 43<H>, Albumin 1.9<L>, Total Bilirubin 0.2, Hemoglobin 8.1<L>, Hematocrit 25.3<L>, Creatine Kinase <<27>    Triglycerides, Serum: 157 mg/dL (04-10-20 @ 02:24)  Triglycerides, Serum: 142 mg/dL (04-08-20 @ 03:40)    POCT Blood Glucose.: 177 mg/dL (04-10-20 @ 06:12)  POCT Blood Glucose.: 185 mg/dL (04-10-20 @ 04:15)  POCT Blood Glucose.: 182 mg/dL (04-10-20 @ 01:55)  POCT Blood Glucose.: 181 mg/dL (04-09-20 @ 23:56)  POCT Blood Glucose.: 154 mg/dL (04-09-20 @ 22:15)  POCT Blood Glucose.: 255 mg/dL (04-09-20 @ 20:21)  POCT Blood Glucose.: 255 mg/dL (04-09-20 @ 17:54)  POCT Blood Glucose.: 264 mg/dL (04-09-20 @ 16:15)  POCT Blood Glucose.: 273 mg/dL (04-09-20 @ 13:46)  POCT Blood Glucose.: 306 mg/dL (04-09-20 @ 11:28)    Skin: no pressure injuries documented  Edema: 3+ dependent    Estimated Needs: with consideration for BMI>40, intubation, and HD  Energy: 8449-3445 marisol/day (11-14cal/Kg per dosing wt 100Kg)  Protein: 73-82 Gm/day (1.6-1.8 Gm/Kg per IBW 45.5Kg)  [place] Equation (REE): 1668 calories (17 marisol/Kg)    Previous Nutrition Diagnosis: Overweight/Obesity  Nutrition Diagnosis is: ongoing, being addressed with hypocaloric EN regimen    New Nutrition Diagnosis:  none    Recommended Interventions:   1. Continue Nepro @ 30 mL/hr x 24 hrs to provide 720 ml formula, 1296 calories (13 marisol/Kg per dosing wt 100Kg), 58 grams protein (1.3 Gm/Kg per IBW 45.5Kg).  2. If Bolus EN is required: Increase Bolus EN rate by 50ml/day to goal: Nepro @ 180ml q6 hrs  3. If HD to continue, add 2 Prosource (120cal, 30Gm protein) for a total of 88 grams protein (1.9 Gm/Kg IBW)     Monitoring and Evaluation:   Continue to monitor nutrition provision and tolerance, weights, labs, skin integrity.   RD remains available upon request and will follow up per protocol.    Wendy Belcher MS RD CDN [facility]; Pager # [phone]

## 2020-04-10 NOTE — PROGRESS NOTE ADULT - SUBJECTIVE AND OBJECTIVE BOX
HISTORY  65y Female with COVID-19(+) pneumonia requiring intubation.    24 HOUR EVENTS: Started on HD yesterday. TFs changed to nepro    SUBJECTIVE/ROS: Due to intubation with sedation, subjective information was not able to be obtained from the patient. History was obtained, to the extent possible, from review of the chart and collateral sources of information.      NEURO  Exam: sedated, no acute distress  Meds: acetaminophen    Suspension .. 650 milliGRAM(s) Enteral Tube every 6 hours PRN Temp greater or equal to 38C (100.4F)  dexMEDEtomidine Infusion 0.5 MICROgram(s)/kG/Hr IV Continuous <Continuous>  propofol Infusion 45 MICROgram(s)/kG/Min IV Continuous <Continuous>    Adequacy of sedation and pain control has been assessed and adjusted.      RESPIRATORY  RR: 31 (20 - 36)  SpO2: 96% (94% - 97%)  Exam: without dissynchrony  Mechanical Ventilation: Mode: AC/ CMV (Assist Control/ Continuous Mandatory Ventilation), RR (machine): 32, TV (machine): 300, FiO2: 30, PEEP: 5  Predicted Body Weight: 49.4  Tidal Volume/PBW ratio: 6.07  Ppeak: 23 // Pplat: 21  PaO2 to FiO2 ratio:  Extubation readiness assessed.  ABG - ( 09 Apr 2020 04:57 )  pH: 7.23  /  pCO2: 32    /  pO2: 108   / HCO3: 13    / Base Excess: -13.2 /  SaO2: 98      Lactate: x          CARDIOVASCULAR  HR: 60 (57 - 89)  ABP: 171/66 (117/69 - 171/71)  ABP(mean): 108 (89 - 113)  Cardiac Rhythm: sinus  Meds: amLODIPine   Tablet  buMETAnide Infusion (10 mL/Hr)  hydrALAZINE Injectable PRN  metolazone  metoprolol tartrate Injectable        GI/NUTRITION  Diet: Diet, NPO with Tube Feed:   Tube Feeding Modality: Orogastric  Nepro with Carb Steady (NEPRORTH)  Total Volume for 24 Hours (mL): 720  Continuous  Starting Tube Feed Rate mL per Hour: 10  Increase Tube Feed Rate by (mL): 10     Every 4 hours  Until Goal Tube Feed Rate (mL per Hour): 30  Tube Feed Duration (in Hours): 24  Tube Feed Start Time: 21:00 (04-09-20 @ 20:53)    Most recent bowel movement:  Meds/stress ulcer prophylaxis: famotidine Injectable 20 milliGRAM(s) IV Push daily  lactulose Syrup 20 Gram(s) Oral daily  senna 2 Tablet(s) Oral at bedtime  sodium bicarbonate 1300 milliGRAM(s) Oral three times a day    GENITOURINARY  I&O's Detail    04-08 @ 07:01  -  04-09 @ 07:00  --------------------------------------------------------  IN:    bumetanide Infusion: 230 mL    dexmedetomidine Infusion: 517.5 mL    Enteral Tube Flush: 120 mL    ns in tub fed  [id number]: 570 mL    propofol Infusion: 486 mL  Total IN: 1923.5 mL    OUT:    Indwelling Catheter - Urethral: 2380 mL  Total OUT: 2380 mL    Total NET: -456.5 mL      04-09 @ 07:01  -  04-10 @ 01:35  --------------------------------------------------------  IN:    bumetanide Infusion: 170 mL    dexmedetomidine Infusion: 422.5 mL    insulin regular Infusion: 20 mL    Nepro with Carb Steady: 90 mL    ns in tub fed  [id number]: 420 mL    Other: 700 mL    propofol Infusion: 399 mL  Total IN: 2221.5 mL    OUT:    Indwelling Catheter - Urethral: 2090 mL    Other: 2000 mL  Total OUT: 4090 mL    Total NET: -1868.5 mL    04-09    131<L>  |  94<L>  |  104<H>  ----------------------------<  269<H>  5.7<H>   |  13<L>  |  5.85<H>    Ca    8.1<L>      09 Apr 2020 17:32  Phos  10.4     04-09  Mg     2.2     04-09    TPro  6.0  /  Alb  2.1<L>  /  TBili  0.2  /  DBili  x   /  AST  39  /  ALT  37  /  AlkPhos  595<H>  04-09    [x] Glass catheter, indication: urine output monitoring in critically ill patient.  Meds: sodium bicarbonate 1300 milliGRAM(s) Oral three times a day    Creatine Kinase, Serum: 36 (04-08-20 @ 03:40)    HEMATOLOGIC  Meds/VTE prophylaxis: aspirin  chewable 81 milliGRAM(s) Enteral Tube daily  heparin  Injectable 7500 Unit(s) SubCutaneous every 12 hours                          7.8    8.69  )-----------( 235      ( 09 Apr 2020 04:59 )             25.1     PT/INR - ( 08 Apr 2020 03:40 )   PT: 17.4 sec;   INR: 1.51 ratio         PTT - ( 08 Apr 2020 03:40 )  PTT:72.5 sec    INFECTIOUS DISEASES  T(C): 36, Max: 36.6 (04-09-20 @ 03:00)  WBC Count: 8.69 (04-09-20 @ 04:59)  WBC Count: 8.65 (04-08-20 @ 03:40)    Recent Cultures:  Specimen Source: .Blood Blood-Peripheral, 04-03 @ 17:12; Results   No Growth Final; Gram Stain: --; Organism: --  Specimen Source: .Blood Blood-Peripheral, 04-03 @ 17:11; Results   No Growth Final; Gram Stain: --; Organism: --    Meds:   Procalcitonin, Serum: 0.75 ng/mL (04-09-20 @ 04:59)    ENDOCRINE  Fingersticks: 181, 154, 255, 255, 264  Meds: atorvastatin 20 milliGRAM(s) Oral at bedtime  insulin regular Infusion 2 Unit(s)/Hr IV Continuous <Continuous>  levothyroxine Injectable 100 MICROGram(s) IV Push at bedtime  methylPREDNISolone sodium succinate Injectable 50 milliGRAM(s) IV Push every 8 hours      ACCESS DEVICES:  [x] Central Venous Line	[ ] R	[x ] L	[ x] IJ	[ ] Fem	[ ] SC  [x] Arterial Line		[ ] R	[x ] L	[ ] Fem	[ x] Rad	[ ] Ax  [x ] [facility] HD Access             [x ] R [ ] L [ x] IJ  [ ] Fem     Placed: 4/8  [x] Urinary Catheter  Necessity of urinary, arterial, and venous catheters discussed.      CODE STATUS: full code HISTORY  65y Female with COVID-19(+) pneumonia requiring intubation.    24 HOUR EVENTS: Started on HD yesterday. TFs changed to nepro    SUBJECTIVE/ROS: Due to intubation with sedation, subjective information was not able to be obtained from the patient. History was obtained, to the extent possible, from review of the chart and collateral sources of information.      NEURO  Exam: sedated, no acute distress  Meds: acetaminophen    Suspension .. 650 milliGRAM(s) Enteral Tube every 6 hours PRN Temp greater or equal to 38C (100.4F)  dexMEDEtomidine Infusion 0.5 MICROgram(s)/kG/Hr IV Continuous <Continuous>  propofol Infusion 45 MICROgram(s)/kG/Min IV Continuous <Continuous>    Adequacy of sedation and pain control has been assessed and adjusted.      RESPIRATORY  RR: 31 (20 - 36)  SpO2: 96% (94% - 97%)  Exam: without dissynchrony  Mechanical Ventilation: Mode: AC/ CMV (Assist Control/ Continuous Mandatory Ventilation), RR (machine): 32, TV (machine): 300, FiO2: 30, PEEP: 5  Predicted Body Weight: 49.4  Tidal Volume/PBW ratio: 6.07  Ppeak: 23 // Pplat: 21  PaO2 to FiO2 ratio: 463  Extubation readiness assessed.  ABG - ( 09 Apr 2020 04:57 )  pH: 7.23  /  pCO2: 32    /  pO2: 108   / HCO3: 13    / Base Excess: -13.2 /  SaO2: 98      Lactate: x          CARDIOVASCULAR  HR: 60 (57 - 89)  ABP: 171/66 (117/69 - 171/71)  ABP(mean): 108 (89 - 113)  Cardiac Rhythm: sinus  Meds: amLODIPine   Tablet  buMETAnide Infusion (10 mL/Hr)  hydrALAZINE Injectable PRN  metolazone  metoprolol tartrate Injectable        GI/NUTRITION  Diet: Diet, NPO with Tube Feed:   Tube Feeding Modality: Orogastric  Nepro with Carb Steady (NEPRORTH)  Total Volume for 24 Hours (mL): 720  Continuous  Starting Tube Feed Rate mL per Hour: 10  Increase Tube Feed Rate by (mL): 10     Every 4 hours  Until Goal Tube Feed Rate (mL per Hour): 30  Tube Feed Duration (in Hours): 24  Tube Feed Start Time: 21:00 (04-09-20 @ 20:53)    Most recent bowel movement:  Meds/stress ulcer prophylaxis: famotidine Injectable 20 milliGRAM(s) IV Push daily  lactulose Syrup 20 Gram(s) Oral daily  senna 2 Tablet(s) Oral at bedtime  sodium bicarbonate 1300 milliGRAM(s) Oral three times a day    GENITOURINARY  I&O's Detail    04-08 @ 07:01  -  04-09 @ 07:00  --------------------------------------------------------  IN:    bumetanide Infusion: 230 mL    dexmedetomidine Infusion: 517.5 mL    Enteral Tube Flush: 120 mL    ns in tub fed  [id number]: 570 mL    propofol Infusion: 486 mL  Total IN: 1923.5 mL    OUT:    Indwelling Catheter - Urethral: 2380 mL  Total OUT: 2380 mL    Total NET: -456.5 mL      04-09 @ 07:01  -  04-10 @ 01:35  --------------------------------------------------------  IN:    bumetanide Infusion: 170 mL    dexmedetomidine Infusion: 422.5 mL    insulin regular Infusion: 20 mL    Nepro with Carb Steady: 90 mL    ns in tub fed  [id number]: 420 mL    Other: 700 mL    propofol Infusion: 399 mL  Total IN: 2221.5 mL    OUT:    Indwelling Catheter - Urethral: 2090 mL    Other: 2000 mL  Total OUT: 4090 mL    Total NET: -1868.5 mL    04-09    131<L>  |  94<L>  |  104<H>  ----------------------------<  269<H>  5.7<H>   |  13<L>  |  5.85<H>    Ca    8.1<L>      09 Apr 2020 17:32  Phos  10.4     04-09  Mg     2.2     04-09    TPro  6.0  /  Alb  2.1<L>  /  TBili  0.2  /  DBili  x   /  AST  39  /  ALT  37  /  AlkPhos  595<H>  04-09    [x] Glass catheter, indication: urine output monitoring in critically ill patient.  Meds: sodium bicarbonate 1300 milliGRAM(s) Oral three times a day    Creatine Kinase, Serum: 36 (04-08-20 @ 03:40)    HEMATOLOGIC  Meds/VTE prophylaxis: aspirin  chewable 81 milliGRAM(s) Enteral Tube daily  heparin  Injectable 7500 Unit(s) SubCutaneous every 12 hours                          7.8    8.69  )-----------( 235      ( 09 Apr 2020 04:59 )             25.1     PT/INR - ( 08 Apr 2020 03:40 )   PT: 17.4 sec;   INR: 1.51 ratio         PTT - ( 08 Apr 2020 03:40 )  PTT:72.5 sec    INFECTIOUS DISEASES  T(C): 36, Max: 36.6 (04-09-20 @ 03:00)  WBC Count: 8.69 (04-09-20 @ 04:59)  WBC Count: 8.65 (04-08-20 @ 03:40)    Recent Cultures:  Specimen Source: .Blood Blood-Peripheral, 04-03 @ 17:12; Results   No Growth Final; Gram Stain: --; Organism: --  Specimen Source: .Blood Blood-Peripheral, 04-03 @ 17:11; Results   No Growth Final; Gram Stain: --; Organism: --    Meds:   Procalcitonin, Serum: 0.75 ng/mL (04-09-20 @ 04:59)    ENDOCRINE  Fingersticks: 181, 154, 255, 255, 264  Meds: atorvastatin 20 milliGRAM(s) Oral at bedtime  insulin regular Infusion 2 Unit(s)/Hr IV Continuous <Continuous>  levothyroxine Injectable 100 MICROGram(s) IV Push at bedtime  methylPREDNISolone sodium succinate Injectable 50 milliGRAM(s) IV Push every 8 hours      ACCESS DEVICES:  [x] Central Venous Line	[ ] R	[x ] L	[ x] IJ	[ ] Fem	[ ] SC  [x] Arterial Line		[ ] R	[x ] L	[ ] Fem	[ x] Rad	[ ] Ax  [x ] [facility] HD Access             [x ] R [ ] L [ x] IJ  [ ] Fem     Placed: 4/8  [x] Urinary Catheter  Necessity of urinary, arterial, and venous catheters discussed.      CODE STATUS: full code

## 2020-04-10 NOTE — PROGRESS NOTE ADULT - ASSESSMENT
65F PMHx DM2, HTN, HLD who present to ED for fever/dyspnea x 1 week.  Per HPI, pt desaturated at home O2 sat 70% which improved nasal cannula (another family member).  In ED hypoxia 70s on nasal cannula s/p intubation.  Covid positive with CXR b/l opacities. Given ceftriaxone, azithromycin, hydroxychlorquine.     Nephrology consulted for MARIO or MARIO on CKD.       MARIO    likely 2/2 ATN in setting septic shock Covid19 infection, low BP  on admission sCr 3.93 on 4/3/2020, has been uptrending since.  Exact duration of elevated Scr however unknown, no prior labs available for review  - maintaining UO, however with worsened BUN today, s/p HD yesterday. No plan for RRT today  - Send UA, urine electrolytes, spot urine TP/CR. Agree with Glass catheter placement.  obtain renal U/S when feasible.  - Monitor labs and urine output. Avoid NSAIDs, ACEI/ARBS, RCA and nephrotoxins. Dose medications as per eGFR.        Metabolic acidosis   likely 2/2 MARIO ATN in setting septic shock covid19  s/p HD yesterday  continue sodium bicarb 1300 PO TID  plan for HD today  monitor daily    Hyperphosphatemia  in the setting of MARIO   switch renvela to calcium acetate 2 tabs TID with meals  Check intact PTH level. Monitor serum phosphorus    Hypocalcemia   in the setting of hyperphosphatemia and hypoalbuminemia  - monitor ionized calcium, replete to keep >1.1 65F PMHx DM2, HTN, HLD who present to ED for fever/dyspnea x 1 week.  Per HPI, pt desaturated at home O2 sat 70% which improved nasal cannula (another family member).  In ED hypoxia 70s on nasal cannula s/p intubation.  Covid positive with CXR b/l opacities. Given ceftriaxone, azithromycin, hydroxychlorquine.     Nephrology consulted for MARIO or MARIO on CKD.       MARIO    likely 2/2 ATN in setting septic shock Covid19 infection, low BP  on admission sCr 3.93 on 4/3/2020, has been uptrending since.  Exact duration of elevated Scr however unknown, no prior labs available for review  - maintaining UO, however with worsened BUN today, s/p HD yesterday. No plan for RRT today, will likely need HD tomorrow.  - Send UA, urine electrolytes, spot urine TP/CR. Agree with Glass catheter placement.  obtain renal U/S when feasible.  - Monitor labs and urine output. Avoid NSAIDs, ACEI/ARBS, RCA and nephrotoxins. Dose medications as per eGFR.        Metabolic acidosis   likely 2/2 MARIO ATN in setting septic shock covid19  s/p HD yesterday  continue sodium bicarb 1300 PO TID  monitor daily    Hyperphosphatemia  in the setting of MARIO   switch renvela to calcium acetate 2 tabs TID with meals  Check intact PTH level. Monitor serum phosphorus    Hypocalcemia   in the setting of hyperphosphatemia and hypoalbuminemia  - monitor ionized calcium, replete to keep >1.1

## 2020-04-10 NOTE — PROGRESS NOTE ADULT - ASSESSMENT
64 yo female with HTN, DM, HLD, who presented with SOB and was found to be COVID+, required intubation and mechanical ventilation    Neuro:   Agitation  -Propofol @45, precedex@1.5  - Wean sedation as tolerated    Pulm:   Hypoxic respiratory failure 2/2 COVID  - Mechanical ventilation: 32/300/30%/5  -Will attempt SBT once acidosis improved    CV:  HTN  -Amlodipine 10 mg daily  -Metoprolol 5 mg Q6H    GI:   Malnutrition  - TFs changed to Nepro with CS @ 30 due to MARIO  - Pepcid for ulcer prophylaxis  - Senna and lactulose for bowel regimen    :   Oliguric stage 3 MARIO on CKD  Hyperphosphatemia  -[facility] nephrology recs; started on HD yesterday  - Bumex @ 2  - Sevelamer 1600 TID  - Sodium bicarb 1300 TID    ID:   COVID PNA  - Currently on Solumedrol 50 mg for 3 day course  -Plaquenil dosing per current guideline (400 daily) completed  - Dose of Actemra given 4/7  - Will check tracheal aspirate (sputum) for COVID    Heme: no active issues   -Continue HSQ 7500 q12 per new system guidelines for chemical VTE prophylaxis in COVID patients    Endo:  DM2  -Continue insulin drip  -Will calculate requirement and start on Lantus 64 yo female with HTN, DM, HLD, who presented with SOB and was found to be COVID+, required intubation and mechanical ventilation    Neuro   - propofol @ 40 mcg/kg/min wean as kenya  - precedex 1 mcg/ kg/ hr   - Daily sedation vacation for weaning trail    CV     - Add hydralazine 50mg Q0kkwel OGT  - hydralazine 10 mg IV q6 PRN MAP>100  - norvasc 5mg        Pulmonary  - AC/VC 30/350/30/5 4/10  - ABG @13:14 pm 7.34/36/110/19/93  - pPeak 23 (4/10), pPlat 20 (4/9)  - P/F =463  -TV/IBW = 6.12    GI  - Nepro change to bolus feed 180ml Q 6 hours    - pepcid 20mg IVP x1   - senna       - UO= 2.7L + 1.3 L form HD  - HD 4/9- 1.3 L removed   -bumex gtt at 2mg /hr  and metolazone 10mg OGT QD 4/10  - phos 10.3 change sevelemer to phoslo BID    Heme   - HSQ daily 7500u q12hrs   - DDimer 1859   - Ferritin-506 (4/10)--318 (4/8)  - CRP 7.92 (4/10)--34.73 (4/8)      ENDO  -  insulin gtt at 4u/hr (check FS q 2hrs)  started 4/9  - FS  278-300'S     ID  - Start anakinra x3 d 4/10  -check inflammatory markers in am   -4/8 solumedrol 50mg IV q 8hrsuntil 4/11   -4/7 ACTEMRA  400mg IVPB x 1 given (d/w Dr Foote ID)   - completed plaquenil   - procalcitonin 0.75  - -264      Lines   - R IJ nhanley (4/8), L  IJ  triple lumen,   L radial A-line

## 2020-04-10 NOTE — PROGRESS NOTE ADULT - ATTENDING COMMENTS
Pt seen and discussed with ICU staff, agree with above.    Neuro: propofol 40/ precedex 1  - Withdraws to pain but does not follow commands  - Wean sedation as long as does not cause vent dyssynchrony    Pulm: 300/ 32/5/30%, O2 sat 96%  P/F 463 from 360 from 386 from 340 from 416; Pplat 18 from 19 from 19 from 8 from 16  - ARDS improving overall  - SBT today    CV: off pressors; home norvasc and hydralazine restarted. Holding home labetalol because HR <70    GI: tolerating tube feeds  - On Nepro now that she is on HD; transition to bolus feeds  - Pepcid  - Liquid BMs, bowel regimen held    : oliguric stage 3 MARIO on CKD unknown baseline Cr (although pt's family says she has kidney problems)  - HD yesterday, removal of 1.3L; overall approx -2L  - On bumex 2mg/hr, with good urine output  - Phosphate binder for hyperphosphatemia (switched sevelamer to phoslo due to sevelamer shortage  - Metolazone 10mg x1  - D/w Nephro    ID: Plaquenil dosing per current guideline completed; dose of Actemra given 4/8  - PCT 0.75  - Inflammatory markers: ferritin 506 from 318; CRP 7.9 from 34; U1whlhl 1859 from 1191  - On solumedrol 3 day course for cytokine storm, since D-dimer still rising, will add Anakinra  - Will check tracheal aspirate (sputum) for COVID     Heme: Continue HSQ 7500 q12 per new system guidelines for chemical VTE prophylaxis in COVID patients (CrCl < 20, BMI >30)    Endo: insulin gtt, Will calculate needs and start long-acting insulin per protocol

## 2020-04-11 LAB
4/8 RATIO: 2.89 RATIO — SIGNIFICANT CHANGE UP (ref 0.9–3.6)
ABS CD8: 132 /UL — LOW (ref 142–740)
ALBUMIN SERPL ELPH-MCNC: 1.9 G/DL — LOW (ref 3.3–5)
ALBUMIN SERPL ELPH-MCNC: 2.3 G/DL — LOW (ref 3.3–5)
ALP SERPL-CCNC: 719 U/L — HIGH (ref 40–120)
ALP SERPL-CCNC: 729 U/L — HIGH (ref 40–120)
ALT FLD-CCNC: 58 U/L — HIGH (ref 10–45)
ALT FLD-CCNC: 64 U/L — HIGH (ref 10–45)
ANION GAP SERPL CALC-SCNC: 23 MMOL/L — HIGH (ref 5–17)
ANION GAP SERPL CALC-SCNC: 23 MMOL/L — HIGH (ref 5–17)
APTT BLD: 34.1 SEC — SIGNIFICANT CHANGE UP (ref 27.5–36.3)
AST SERPL-CCNC: 74 U/L — HIGH (ref 10–40)
AST SERPL-CCNC: 77 U/L — HIGH (ref 10–40)
BASE EXCESS BLDA CALC-SCNC: -4.9 MMOL/L — LOW (ref -2–2)
BASE EXCESS BLDA CALC-SCNC: -6.4 MMOL/L — LOW (ref -2–2)
BASOPHILS # BLD AUTO: 0.01 K/UL — SIGNIFICANT CHANGE UP (ref 0–0.2)
BASOPHILS NFR BLD AUTO: 0.1 % — SIGNIFICANT CHANGE UP (ref 0–2)
BILIRUB SERPL-MCNC: 0.3 MG/DL — SIGNIFICANT CHANGE UP (ref 0.2–1.2)
BILIRUB SERPL-MCNC: 0.3 MG/DL — SIGNIFICANT CHANGE UP (ref 0.2–1.2)
BUN SERPL-MCNC: 93 MG/DL — HIGH (ref 7–23)
BUN SERPL-MCNC: 99 MG/DL — HIGH (ref 7–23)
CALCIUM SERPL-MCNC: 8.3 MG/DL — LOW (ref 8.4–10.5)
CALCIUM SERPL-MCNC: 8.5 MG/DL — SIGNIFICANT CHANGE UP (ref 8.4–10.5)
CD3 BLASTS SPEC-ACNC: 511 /UL — LOW (ref 672–1870)
CD3 BLASTS SPEC-ACNC: 78 % — SIGNIFICANT CHANGE UP (ref 59–83)
CD4 %: 58 % — SIGNIFICANT CHANGE UP (ref 30–62)
CD8 %: 20 % — SIGNIFICANT CHANGE UP (ref 12–36)
CHLORIDE SERPL-SCNC: 94 MMOL/L — LOW (ref 96–108)
CHLORIDE SERPL-SCNC: 94 MMOL/L — LOW (ref 96–108)
CK SERPL-CCNC: 21 U/L — LOW (ref 25–170)
CO2 BLDA-SCNC: 20 MMOL/L — LOW (ref 22–30)
CO2 BLDA-SCNC: 20 MMOL/L — LOW (ref 22–30)
CO2 SERPL-SCNC: 15 MMOL/L — LOW (ref 22–31)
CO2 SERPL-SCNC: 17 MMOL/L — LOW (ref 22–31)
CREAT SERPL-MCNC: 4.78 MG/DL — HIGH (ref 0.5–1.3)
CREAT SERPL-MCNC: 4.84 MG/DL — HIGH (ref 0.5–1.3)
CRP SERPL-MCNC: 4.03 MG/DL — HIGH (ref 0–0.4)
D DIMER BLD IA.RAPID-MCNC: 1901 NG/ML DDU — HIGH
EOSINOPHIL # BLD AUTO: 0.01 K/UL — SIGNIFICANT CHANGE UP (ref 0–0.5)
EOSINOPHIL NFR BLD AUTO: 0.1 % — SIGNIFICANT CHANGE UP (ref 0–6)
FERRITIN SERPL-MCNC: 815 NG/ML — HIGH (ref 15–150)
GAS PNL BLDA: SIGNIFICANT CHANGE UP
GLUCOSE BLDC GLUCOMTR-MCNC: 126 MG/DL — HIGH (ref 70–99)
GLUCOSE BLDC GLUCOMTR-MCNC: 136 MG/DL — HIGH (ref 70–99)
GLUCOSE BLDC GLUCOMTR-MCNC: 138 MG/DL — HIGH (ref 70–99)
GLUCOSE BLDC GLUCOMTR-MCNC: 144 MG/DL — HIGH (ref 70–99)
GLUCOSE BLDC GLUCOMTR-MCNC: 145 MG/DL — HIGH (ref 70–99)
GLUCOSE BLDC GLUCOMTR-MCNC: 167 MG/DL — HIGH (ref 70–99)
GLUCOSE BLDC GLUCOMTR-MCNC: 232 MG/DL — HIGH (ref 70–99)
GLUCOSE SERPL-MCNC: 140 MG/DL — HIGH (ref 70–99)
GLUCOSE SERPL-MCNC: 95 MG/DL — SIGNIFICANT CHANGE UP (ref 70–99)
HCO3 BLDA-SCNC: 18 MMOL/L — LOW (ref 21–29)
HCO3 BLDA-SCNC: 19 MMOL/L — LOW (ref 21–29)
HCT VFR BLD CALC: 25.3 % — LOW (ref 34.5–45)
HGB BLD-MCNC: 8.2 G/DL — LOW (ref 11.5–15.5)
HOROWITZ INDEX BLDA+IHG-RTO: 30 — SIGNIFICANT CHANGE UP
HOROWITZ INDEX BLDA+IHG-RTO: 40 — SIGNIFICANT CHANGE UP
IMM GRANULOCYTES NFR BLD AUTO: 1.1 % — SIGNIFICANT CHANGE UP (ref 0–1.5)
INR BLD: 1.09 RATIO — SIGNIFICANT CHANGE UP (ref 0.88–1.16)
LDH SERPL L TO P-CCNC: 281 U/L — HIGH (ref 50–242)
LYMPHOCYTES # BLD AUTO: 0.69 K/UL — LOW (ref 1–3.3)
LYMPHOCYTES # BLD AUTO: 6.4 % — LOW (ref 13–44)
MAGNESIUM SERPL-MCNC: 2.1 MG/DL — SIGNIFICANT CHANGE UP (ref 1.6–2.6)
MAGNESIUM SERPL-MCNC: 2.1 MG/DL — SIGNIFICANT CHANGE UP (ref 1.6–2.6)
MCHC RBC-ENTMCNC: 25.2 PG — LOW (ref 27–34)
MCHC RBC-ENTMCNC: 32.4 GM/DL — SIGNIFICANT CHANGE UP (ref 32–36)
MCV RBC AUTO: 77.6 FL — LOW (ref 80–100)
MONOCYTES # BLD AUTO: 0.83 K/UL — SIGNIFICANT CHANGE UP (ref 0–0.9)
MONOCYTES NFR BLD AUTO: 7.7 % — SIGNIFICANT CHANGE UP (ref 2–14)
NEUTROPHILS # BLD AUTO: 9.15 K/UL — HIGH (ref 1.8–7.4)
NEUTROPHILS NFR BLD AUTO: 84.6 % — HIGH (ref 43–77)
NRBC # BLD: 0 /100 WBCS — SIGNIFICANT CHANGE UP (ref 0–0)
PCO2 BLDA: 33 MMHG — SIGNIFICANT CHANGE UP (ref 32–46)
PCO2 BLDA: 36 MMHG — SIGNIFICANT CHANGE UP (ref 32–46)
PH BLDA: 7.33 — LOW (ref 7.35–7.45)
PH BLDA: 7.38 — SIGNIFICANT CHANGE UP (ref 7.35–7.45)
PHOSPHATE SERPL-MCNC: 9 MG/DL — HIGH (ref 2.5–4.5)
PHOSPHATE SERPL-MCNC: 9 MG/DL — HIGH (ref 2.5–4.5)
PLATELET # BLD AUTO: 282 K/UL — SIGNIFICANT CHANGE UP (ref 150–400)
PO2 BLDA: 118 MMHG — HIGH (ref 74–108)
PO2 BLDA: 125 MMHG — HIGH (ref 74–108)
POTASSIUM SERPL-MCNC: 3.9 MMOL/L — SIGNIFICANT CHANGE UP (ref 3.5–5.3)
POTASSIUM SERPL-MCNC: 4.2 MMOL/L — SIGNIFICANT CHANGE UP (ref 3.5–5.3)
POTASSIUM SERPL-SCNC: 3.9 MMOL/L — SIGNIFICANT CHANGE UP (ref 3.5–5.3)
POTASSIUM SERPL-SCNC: 4.2 MMOL/L — SIGNIFICANT CHANGE UP (ref 3.5–5.3)
PROCALCITONIN SERPL-MCNC: 0.62 NG/ML — HIGH (ref 0.02–0.1)
PROT SERPL-MCNC: 5.8 G/DL — LOW (ref 6–8.3)
PROT SERPL-MCNC: 6 G/DL — SIGNIFICANT CHANGE UP (ref 6–8.3)
PROTHROM AB SERPL-ACNC: 12.6 SEC — SIGNIFICANT CHANGE UP (ref 10–12.9)
RBC # BLD: 3.26 M/UL — LOW (ref 3.8–5.2)
RBC # FLD: 13.6 % — SIGNIFICANT CHANGE UP (ref 10.3–14.5)
SAO2 % BLDA: 98 % — HIGH (ref 92–96)
SAO2 % BLDA: 99 % — HIGH (ref 92–96)
SODIUM SERPL-SCNC: 132 MMOL/L — LOW (ref 135–145)
SODIUM SERPL-SCNC: 134 MMOL/L — LOW (ref 135–145)
T-CELL CD4 SUBSET PNL BLD: 381 /UL — LOW (ref 489–1457)
TRIGL SERPL-MCNC: 144 MG/DL — SIGNIFICANT CHANGE UP (ref 10–149)
TROPONIN T, HIGH SENSITIVITY RESULT: 107 NG/L — HIGH (ref 0–51)
WBC # BLD: 10.81 K/UL — HIGH (ref 3.8–10.5)
WBC # FLD AUTO: 10.81 K/UL — HIGH (ref 3.8–10.5)

## 2020-04-11 PROCEDURE — 99233 SBSQ HOSP IP/OBS HIGH 50: CPT | Mod: GC

## 2020-04-11 PROCEDURE — 99291 CRITICAL CARE FIRST HOUR: CPT

## 2020-04-11 RX ORDER — INSULIN GLARGINE 100 [IU]/ML
6 INJECTION, SOLUTION SUBCUTANEOUS AT BEDTIME
Refills: 0 | Status: DISCONTINUED | OUTPATIENT
Start: 2020-04-11 | End: 2020-04-13

## 2020-04-11 RX ORDER — INSULIN LISPRO 100/ML
2 VIAL (ML) SUBCUTANEOUS EVERY 6 HOURS
Refills: 0 | Status: DISCONTINUED | OUTPATIENT
Start: 2020-04-11 | End: 2020-04-13

## 2020-04-11 RX ORDER — INSULIN LISPRO 100/ML
VIAL (ML) SUBCUTANEOUS AT BEDTIME
Refills: 0 | Status: DISCONTINUED | OUTPATIENT
Start: 2020-04-11 | End: 2020-04-13

## 2020-04-11 RX ORDER — DEXTROSE 50 % IN WATER 50 %
12.5 SYRINGE (ML) INTRAVENOUS ONCE
Refills: 0 | Status: DISCONTINUED | OUTPATIENT
Start: 2020-04-11 | End: 2020-04-23

## 2020-04-11 RX ORDER — DEXTROSE 50 % IN WATER 50 %
15 SYRINGE (ML) INTRAVENOUS ONCE
Refills: 0 | Status: DISCONTINUED | OUTPATIENT
Start: 2020-04-11 | End: 2020-04-23

## 2020-04-11 RX ORDER — GLUCAGON INJECTION, SOLUTION 0.5 MG/.1ML
1 INJECTION, SOLUTION SUBCUTANEOUS ONCE
Refills: 0 | Status: DISCONTINUED | OUTPATIENT
Start: 2020-04-11 | End: 2020-04-23

## 2020-04-11 RX ORDER — DEXTROSE 50 % IN WATER 50 %
25 SYRINGE (ML) INTRAVENOUS ONCE
Refills: 0 | Status: DISCONTINUED | OUTPATIENT
Start: 2020-04-11 | End: 2020-04-23

## 2020-04-11 RX ORDER — SODIUM CHLORIDE 9 MG/ML
1000 INJECTION, SOLUTION INTRAVENOUS
Refills: 0 | Status: DISCONTINUED | OUTPATIENT
Start: 2020-04-11 | End: 2020-04-23

## 2020-04-11 RX ORDER — INSULIN LISPRO 100/ML
VIAL (ML) SUBCUTANEOUS
Refills: 0 | Status: DISCONTINUED | OUTPATIENT
Start: 2020-04-11 | End: 2020-04-13

## 2020-04-11 RX ADMIN — HEPARIN SODIUM 7500 UNIT(S): 5000 INJECTION INTRAVENOUS; SUBCUTANEOUS at 18:33

## 2020-04-11 RX ADMIN — Medication 50 MILLIGRAM(S): at 14:47

## 2020-04-11 RX ADMIN — Medication 100 MILLIGRAM(S): at 18:03

## 2020-04-11 RX ADMIN — Medication 1 DROP(S): at 05:30

## 2020-04-11 RX ADMIN — Medication 1300 MILLIGRAM(S): at 23:12

## 2020-04-11 RX ADMIN — HEPARIN SODIUM 7500 UNIT(S): 5000 INJECTION INTRAVENOUS; SUBCUTANEOUS at 05:28

## 2020-04-11 RX ADMIN — Medication 50 MILLIGRAM(S): at 23:12

## 2020-04-11 RX ADMIN — Medication 1300 MILLIGRAM(S): at 05:29

## 2020-04-11 RX ADMIN — Medication 100 MILLIGRAM(S): at 05:30

## 2020-04-11 RX ADMIN — Medication 1334 MILLIGRAM(S): at 14:46

## 2020-04-11 RX ADMIN — BUMETANIDE 10 MG/HR: 0.25 INJECTION INTRAMUSCULAR; INTRAVENOUS at 23:17

## 2020-04-11 RX ADMIN — Medication 100 MICROGRAM(S): at 23:12

## 2020-04-11 RX ADMIN — Medication 50 MILLIGRAM(S): at 01:35

## 2020-04-11 RX ADMIN — Medication 1334 MILLIGRAM(S): at 23:13

## 2020-04-11 RX ADMIN — Medication 1 DROP(S): at 18:33

## 2020-04-11 RX ADMIN — PROPOFOL 27 MICROGRAM(S)/KG/MIN: 10 INJECTION, EMULSION INTRAVENOUS at 23:18

## 2020-04-11 RX ADMIN — DEXMEDETOMIDINE HYDROCHLORIDE IN 0.9% SODIUM CHLORIDE 12.5 MICROGRAM(S)/KG/HR: 4 INJECTION INTRAVENOUS at 23:19

## 2020-04-11 RX ADMIN — ATORVASTATIN CALCIUM 20 MILLIGRAM(S): 80 TABLET, FILM COATED ORAL at 02:36

## 2020-04-11 RX ADMIN — Medication 81 MILLIGRAM(S): at 14:47

## 2020-04-11 RX ADMIN — CHLORHEXIDINE GLUCONATE 15 MILLILITER(S): 213 SOLUTION TOPICAL at 18:33

## 2020-04-11 RX ADMIN — Medication 1300 MILLIGRAM(S): at 14:48

## 2020-04-11 RX ADMIN — Medication 100 MICROGRAM(S): at 01:35

## 2020-04-11 RX ADMIN — FAMOTIDINE 20 MILLIGRAM(S): 10 INJECTION INTRAVENOUS at 14:47

## 2020-04-11 RX ADMIN — ATORVASTATIN CALCIUM 20 MILLIGRAM(S): 80 TABLET, FILM COATED ORAL at 23:12

## 2020-04-11 RX ADMIN — Medication 50 MILLIGRAM(S): at 05:31

## 2020-04-11 RX ADMIN — Medication 1300 MILLIGRAM(S): at 01:35

## 2020-04-11 RX ADMIN — CHLORHEXIDINE GLUCONATE 15 MILLILITER(S): 213 SOLUTION TOPICAL at 05:28

## 2020-04-11 RX ADMIN — INSULIN GLARGINE 6 UNIT(S): 100 INJECTION, SOLUTION SUBCUTANEOUS at 23:35

## 2020-04-11 RX ADMIN — Medication 10 MILLIGRAM(S): at 05:31

## 2020-04-11 RX ADMIN — AMLODIPINE BESYLATE 10 MILLIGRAM(S): 2.5 TABLET ORAL at 05:30

## 2020-04-11 RX ADMIN — INSULIN HUMAN 2 UNIT(S)/HR: 100 INJECTION, SOLUTION SUBCUTANEOUS at 23:18

## 2020-04-11 RX ADMIN — Medication 50 MILLIGRAM(S): at 14:33

## 2020-04-11 NOTE — PROGRESS NOTE ADULT - ATTENDING COMMENTS
Pt seen and discussed with ICU staff, agree with above.    Neuro: propofol 40/ precedex 0.8  - Withdraws to pain but does not follow commands  - Wean sedation as long as does not cause vent dyssynchrony    Pulm: 350/ 30/5/30%, O2 sat 96%  P/F 416 from 463 from 360 from 386 from 340 from 416; Pplat 15 from 18 from 19 from 19 from 8 from 16  - ARDS improving overall  - SBT today    CV: off pressors; home norvasc and hydralazine restarted. Holding home labetalol because HR <70  - On home ASA, statin    GI: tolerating tube feeds  - On Nepro now that she is on HD; tolerating bolus feeds  - Pepcid  - Liquid BMs, bowel regimen held    : oliguric stage 3 MARIO on CKD unknown baseline Cr (although pt's family says she has kidney problems)  - HD on 4/9  - On bumex 2mg/hr and one dose of metolazone yesterday, with good urine output (2300cc)  - Phosphate binder for hyperphosphatemia (switched sevelamer to phoslo due to sevelamer shortage)  - Continue Bumex gtt, hold metolazone today    ID: Plaquenil dosing per current guideline completed; dose of Actemra given 4/8  - PCT 0.75  - Inflammatory markers: ferritin 815 from 506 from 318; CRP 4 from 7.9 from 34; D-dimer 1901 from 1859 from 1191  - On solumedrol 3 day course for cytokine storm, added Anakinra as markers D-dimer and ferritin still increasing  - Will check tracheal aspirate (sputum) for COVID     Heme: Continue HSQ 7500 q12 per new system guidelines for chemical VTE prophylaxis in COVID patients (CrCl < 20, BMI >30)    Endo: insulin gtt, Will calculate needs and start long-acting insulin per protocol Pt seen and discussed with ICU staff, agree with above.    Neuro: propofol 40/ precedex 0.8  - Withdraws to pain but does not follow commands  - Wean sedation as long as does not cause vent dyssynchrony    Pulm: 350/ 30/5/30%, O2 sat 96%  P/F 416 from 463 from 360 from 386 from 340 from 416; Pplat 15 from 18 from 19 from 19 from 8 from 16  - ARDS improving overall  - SBT today    CV: off pressors; home norvasc and hydralazine restarted. Holding home labetalol because HR <70  - On home ASA, statin    GI: tolerating tube feeds  - On Nepro now that she is on HD; tolerating bolus feeds  - Pepcid  - Liquid BMs, bowel regimen held  - Alk phos, AST, ALT elevated, will continue to monitor; bilirubin normal    : oliguric stage 3 MARIO on CKD unknown baseline Cr (although pt's family says she has kidney problems)  - HD on 4/9  - On bumex 2mg/hr and one dose of metolazone yesterday, with good urine output (2300cc)  - Phosphate binder for hyperphosphatemia (switched sevelamer to phoslo due to sevelamer shortage)  - Continue Bumex gtt, hold metolazone today    ID: Plaquenil dosing per current guideline completed; dose of Actemra given 4/8  - PCT 0.75  - Inflammatory markers: ferritin 815 from 506 from 318; CRP 4 from 7.9 from 34; D-dimer 1901 from 1859 from 1191  - On solumedrol 3 day course for cytokine storm, added Anakinra as markers D-dimer and ferritin still increasing  - Will check tracheal aspirate (sputum) for COVID     Heme: Continue HSQ 7500 q12 per new system guidelines for chemical VTE prophylaxis in COVID patients (CrCl < 20, BMI >30)    Endo: insulin gtt, Will calculate needs and start long-acting insulin per protocol

## 2020-04-11 NOTE — PROGRESS NOTE ADULT - SUBJECTIVE AND OBJECTIVE BOX
HISTORY  65y Female with COVID-19(+) pneumonia requiring intubation.    24 HOUR EVENTS:  Failed CPAP trial.     SUBJECTIVE/ROS: Due to intubation with sedation, subjective information was not able to be obtained from the patient. History was obtained, to the extent possible, from review of the chart and collateral sources of information.      NEURO  Exam: sedated, no acute distress  Meds: acetaminophen    Suspension .. 650 milliGRAM(s) Enteral Tube every 6 hours PRN Temp greater or equal to 38C (100.4F)  dexMEDEtomidine Infusion 0.5 MICROgram(s)/kG/Hr IV Continuous <Continuous>  propofol Infusion 45 MICROgram(s)/kG/Min IV Continuous <Continuous>    Adequacy of sedation and pain control has been assessed and adjusted.      RESPIRATORY  RR: 31 (31 - 31)  SpO2: 98% (94% - 98%)  Mechanical Ventilation: Mode: AC/ CMV (Assist Control/ Continuous Mandatory Ventilation), RR (machine): 30, TV (machine): 350, FiO2: 30, PEEP: 5  Tidal Volume/PBW ratio: 6.12  PaO2 to FiO2 ratio: 463  Extubation readiness assessed.  ABG - ( 10 Apr 2020 13:41 )  pH: 7.34  /  pCO2: 36    /  pO2: 110   / HCO3: 19    / Base Excess: -5.3  /  SaO2: 98      Lactate: x                CARDIOVASCULAR  HR: 59 (51 - 72)  ABP: 161/66 (146/55 - 168/63)  ABP(mean): 101 (84 - 103)  Cardiac Rhythm: sinus  Most recent QTc:   Meds: amLODIPine   Tablet  buMETAnide Infusion (10 mL/Hr)  hydrALAZINE  hydrALAZINE Injectable PRN  metolazone        GI/NUTRITION  Diet: Diet, NPO with Tube Feed:   Tube Feeding Modality: Orogastric  Nepro with Carb Steady (NEPRORTH)  Total Volume for 24 Hours (mL): 720  Bolus  Total Volume of Bolus (mL):  180  Tube Feed Frequency: Every 6 hours   Tube Feed Start Time: 12:00  Bolus Feed Rate (mL per Hour): 180   Bolus Feed Duration (in Hours): 1 (04-10-20 @ 14:30)    Most recent bowel movement:  Meds/stress ulcer prophylaxis: calcium acetate 1334 milliGRAM(s) Oral two times a day with meals  famotidine Injectable 20 milliGRAM(s) IV Push daily  sodium bicarbonate 1300 milliGRAM(s) Oral three times a day        GENITOURINARY  I&O's Detail    04-09 @ 07:01  -  04-10 @ 07:00  --------------------------------------------------------  IN:    bumetanide Infusion: 210 mL    bumetanide Infusion: 30 mL    dexmedetomidine Infusion: 597.5 mL    insulin regular Infusion: 44 mL    Nepro with Carb Steady: 300 mL    ns in tub fed  [id number]: 420 mL    Other: 700 mL    propofol Infusion: 567 mL  Total IN: 2868.5 mL    OUT:    Indwelling Catheter - Urethral: 2720 mL    Other: 2000 mL    Rectal Tube: 50 mL  Total OUT: 4770 mL    Total NET: -1901.5 mL      04-10 @ 07:01 - 04-11 @ 01:16  --------------------------------------------------------  IN:    bumetanide Infusion: 100 mL    dexmedetomidine Infusion: 250 mL    insulin regular Infusion: 10 mL    Nepro with Carb Steady: 120 mL    propofol Infusion: 282 mL  Total IN: 762 mL    OUT:    Indwelling Catheter - Urethral: 1275 mL  Total OUT: 1275 mL    Total NET: -513 mL          04-10    132<L>  |  94<L>  |  86<H>  ----------------------------<  129<H>  4.6   |  17<L>  |  4.63<H>    Ca    8.2<L>      10 Apr 2020 13:43  Phos  8.6     04-10  Mg     2.1     04-10    TPro  5.7<L>  /  Alb  2.1<L>  /  TBili  0.2  /  DBili  x   /  AST  67<H>  /  ALT  49<H>  /  AlkPhos  675<H>  04-10    [x] Glass catheter, indication: urine output monitoring in critically ill patient.  Meds: calcium acetate 1334 milliGRAM(s) Oral two times a day with meals  sodium bicarbonate 1300 milliGRAM(s) Oral three times a day    Creatine Kinase, Serum: 50 (04-10-20 @ 02:24)        HEMATOLOGIC  Meds: aspirin  chewable 81 milliGRAM(s) Enteral Tube daily  heparin  Injectable 7500 Unit(s) SubCutaneous every 12 hours    VTE Prophylaxis:                         8.1    6.81  )-----------( 282      ( 10 Apr 2020 02:24 )             25.3     PT/INR - ( 10 Apr 2020 02:24 )   PT: 12.3 sec;   INR: 1.08 ratio         PTT - ( 10 Apr 2020 02:24 )  PTT:33.9 sec  50604-10-20 @ 07:51        INFECTIOUS DISEASES  T(C): 36.8, Max: 37.3 (04-10-20 @ 12:00)  WBC Count: 6.81 (04-10-20 @ 02:24)  WBC Count: 8.69 (04-09-20 @ 04:59)    Recent Cultures:    Meds:   Procalcitonin, Serum: 0.71 ng/mL (04-10-20 @ 02:24)        ENDOCRINE  112  111  161  159  119  150  149  128  177  185  182    Meds: atorvastatin 20 milliGRAM(s) Oral at bedtime  insulin regular Infusion 2 Unit(s)/Hr IV Continuous <Continuous>  levothyroxine Injectable 100 MICROGram(s) IV Push at bedtime  methylPREDNISolone sodium succinate Injectable 50 milliGRAM(s) IV Push every 8 hours        ACCESS DEVICES:  [ ] Peripheral IV  [x] Central Venous Line	[ ] R	[x ] L	[x ] IJ	[ ] Fem	[ ] SC	Placed:   [x] Arterial Line		[ ] R	[x] L	[ ] Fem	 [x] Rad	[ ] Ax	Placed: 4/8  [x] [facility] HD Access             [x] R [ ] L [x] IJ  [ ] Fem     Placed: 4/8  [x] Urinary Catheter, Date Placed:   Necessity of urinary, arterial, and venous catheters discussed.      CODE STATUS:     IMAGING:

## 2020-04-11 NOTE — PROGRESS NOTE ADULT - ASSESSMENT
64 yo female with HTN, DM, HLD, who presented with SOB and was found to be COVID+, required intubation and mechanical ventilation    Neuro   - propofol @ 40 mcg/kg/min wean as kenya  - precedex 1 mcg/ kg/ hr   - Daily sedation vacation for weaning trail    CV     - Add hydralazine 50mg [id number] OGT  - hydralazine 10 mg IV q6 PRN MAP>100  - norvasc 5mg        Pulmonary  - AC/VC 30/350/30/5 4/10  - ABG     GI  - Nepro change to bolus feed 180ml Q 6 hours    - pepcid 20mg IVP x1   - senna       - UO= 2.7L + 1.3 L form HD  - HD 4/9- 1.3 L removed   -bumex gtt at 2mg /hr  and metolazone 10mg OGT QD 4/10  - phos 10.3 change sevelemer to phoslo BID    Heme   - HSQ daily 7500u q12hrs   - DDimer 1859   - Ferritin-506 (4/10)--318 (4/8)  - CRP 7.92 (4/10)--34.73 (4/8)      ENDO  -  insulin gtt at 4u/hr (check FS q 2hrs)  started 4/9  - FS  278-300'S     ID  - Start anakinra x3 d 4/10  -check inflammatory markers in am   -4/8 solumedrol 50mg IV q 8hrsuntil 4/11   -4/7 ACTEMRA  400mg IVPB x 1 given (d/w Dr Foote ID)   - completed plaquenil   - procalcitonin 0.75  - -264      Lines   - R CHELLY montenegro (4/8), L  IJ  triple lumen,   L radial A-line 64 yo female with HTN, DM, HLD, who presented with SOB and was found to be COVID+, required intubation and mechanical ventilation    Neuro   - propofol @ 40 mcg/kg/min wean as tolerated  - precedex 0.8 mcg/ kg/ hr     CV  - hydralazine 50mg [id number] OGT  - hydralazine 10 mg IV q6 PRN MAP>100  - norvasc 10mg   - asa  - atorvastatin    Pulmonary  - AC/VC 26/350/30/5 4/10, rate decreased to 26  - ABG (4/11 afternoon pending after rate decreased)  - pPeak , pPlat  - P/F = 416  -TV/IBW = 6.12    GI  - Nepro bolus feed 180ml Q 6 hours    - pepcid 20mg IV QD  - senna       - UOP 24hrs 2275  - last HD 4/9  - phos 9, phoslo BID  - bumex gtt at 2mg /hr, (metolazone 10mg given 4/10, held 4/11)    Heme   - HSQ daily 7500u q12hrs     ID  - Start anakinra x3 d 4/10  - check inflammatory markers in am   - 4/8 solumedrol 50mg IV q 8hrsuntil 4/11   - 4/7 ACTEMRA  400mg IVPB x 1 given (d/w Dr Foote ID)   - completed plaquenil   - procalcitonin 0.75  - -264    ENDO  - insulin gtt started 4/9 transition to 6u lantus and 2u humalog q6 w/ feeds on 4/11  - IV synthroid    - solumedrol to finish 4/11    Lines   - R IJ moni (4/8), L  IJ  triple lumen,   L radial A-line 66 yo female with HTN, DM, HLD, who presented with SOB and was found to be COVID+, required intubation and mechanical ventilation    Neuro   - propofol @ 40 mcg/kg/min wean as tolerated  - precedex 0.8 mcg/ kg/ hr     CV  - hydralazine 50mg [id number] OGT  - hydralazine 10 mg IV q6 PRN MAP>100  - norvasc 10mg   - asa  - atorvastatin    Pulmonary  - AC/VC 26/350/30/5 4/10, rate decreased to 26  - ABG (4/11 afternoon pending after rate decreased)  - pPeak , pPlat  - P/F = 416  -TV/IBW = 6.12    GI  - Nepro bolus feed 180ml Q 6 hours    - pepcid 20mg IV QD  - senna       - UOP 24hrs 2275  - last HD 4/9  - phos 9, phoslo BID  - bumex gtt at 2mg /hr, (metolazone 10mg given 4/10, held 4/11)    Heme   - HSQ daily 7500u q12hrs     ID  - Start anakinra x3 d 4/10  - check inflammatory markers in am   - 4/8 solumedrol 50mg IV q 8hrsuntil 4/11   - 4/7 ACTEMRA  400mg IVPB x 1 given (d/w Dr Foote ID)   - completed plaquenil   - procalcitonin 0.75  - -264    ENDO  - insulin gtt started 4/9 transition to 6u lantus and 2u humalog q6 w/ feeds on 4/11  - IV synthroid    - solumedrol to finish 4/11    Lines   - R CHELLY montenegro (4/8), L  IJ  triple lumen,   L radial A-line    Spoke to son-in-law and gave him updates on pt. All questions were answered.

## 2020-04-11 NOTE — PROGRESS NOTE ADULT - ASSESSMENT
65F PMHx DM2, HTN, HLD who present to ED for fever/dyspnea x 1 week.  Per HPI, pt desaturated at home O2 sat 70% which improved nasal cannula (another family member).  In ED hypoxia 70s on nasal cannula s/p intubation.  Covid positive with CXR b/l opacities. Given ceftriaxone, azithromycin, hydroxychlorquine.     Nephrology consulted for MARIO or MARIO on CKD.       MARIO    likely 2/2 ATN in setting septic shock Covid19 infection, low BP  on admission sCr 3.93 on 4/3/2020, has been uptrending since.  Exact duration of elevated Scr however unknown, no prior labs available for review  - maintaining UO (~2.3L in the last 24 hrs), however with worsened BUN today, s/p HD 4/9. No indication for HD today, will continue to monitor.  - Agree with Glass catheter placement.  obtain renal U/S when feasible.  - Monitor labs and urine output. Avoid NSAIDs, ACEI/ARBS, RCA and nephrotoxins. Dose medications as per eGFR.        Metabolic acidosis   likely 2/2 MARIO ATN in setting septic shock covid19  s/p HD 4/9  continue sodium bicarb 1300 PO TID  monitor daily    Hyperphosphatemia  in the setting of MARIO   Calcium acetate 2 tabs TID with meals  Check intact PTH level. Monitor serum phosphorus    Hypocalcemia   in the setting of hyperphosphatemia and hypoalbuminemia  - monitor ionized calcium, replete to keep >1.1

## 2020-04-11 NOTE — PROGRESS NOTE ADULT - SUBJECTIVE AND OBJECTIVE BOX
[facility] Division of Kidney Diseases & Hypertension  FOLLOW UP NOTE  [phone]--------------------------------------------------------------------------------  Chief Complaint:Viral pneumonia      24 hour events/subjective: No acute events overnight. Labs, vitals, and medications reviewed. Vital signs significant for bradycardia however patient afebrile. 30%/PEEP 5. Labs grossly stable, BUN noted 99.         PAST HISTORY  --------------------------------------------------------------------------------  No significant changes to PMH, PSH, FHx, SHx, unless otherwise noted    ALLERGIES & MEDICATIONS  --------------------------------------------------------------------------------  Allergies    No Known Allergies    Intolerances      Standing Inpatient Medications  amLODIPine   Tablet 10 milliGRAM(s) Oral daily  anakinra Injectable 100 milliGRAM(s) SubCutaneous two times a day  artificial  tears Solution 1 Drop(s) Both EYES two times a day  aspirin  chewable 81 milliGRAM(s) Enteral Tube daily  atorvastatin 20 milliGRAM(s) Oral at bedtime  buMETAnide Infusion 2 mG/Hr IV Continuous <Continuous>  calcium acetate 1334 milliGRAM(s) Oral two times a day with meals  chlorhexidine 0.12% Liquid 15 milliLiter(s) Oral Mucosa every 12 hours  dexMEDEtomidine Infusion 0.5 MICROgram(s)/kG/Hr IV Continuous <Continuous>  famotidine Injectable 20 milliGRAM(s) IV Push daily  heparin  Injectable 7500 Unit(s) SubCutaneous every 12 hours  hydrALAZINE 50 milliGRAM(s) Oral three times a day  insulin glargine Injectable (LANTUS) 6 Unit(s) SubCutaneous at bedtime  insulin lispro (HumaLOG) corrective regimen sliding scale   SubCutaneous three times a day before meals  insulin lispro (HumaLOG) corrective regimen sliding scale   SubCutaneous at bedtime  insulin lispro Injectable (HumaLOG) 2 Unit(s) SubCutaneous every 6 hours  insulin regular Infusion 2 Unit(s)/Hr IV Continuous <Continuous>  levothyroxine Injectable 100 MICROGram(s) IV Push at bedtime  methylPREDNISolone sodium succinate Injectable 50 milliGRAM(s) IV Push every 8 hours  propofol Infusion 45 MICROgram(s)/kG/Min IV Continuous <Continuous>  sodium bicarbonate 1300 milliGRAM(s) Oral three times a day    PRN Inpatient Medications  acetaminophen    Suspension .. 650 milliGRAM(s) Enteral Tube every 6 hours PRN  dextrose 40% Gel 15 Gram(s) Oral once PRN  glucagon  Injectable 1 milliGRAM(s) IntraMuscular once PRN  hydrALAZINE Injectable 10 milliGRAM(s) IV Push every 6 hours PRN      REVIEW OF SYSTEMS: Unable to obtain 2/2 clinical condition.      VITALS/PHYSICAL EXAM  --------------------------------------------------------------------------------  T(C): 37 (04-11-20 @ 04:00), Max: 37 (04-11-20 @ 04:00)  HR: 43 (04-11-20 @ 12:00) (43 - 72)  SBP: 150-170  RR: 30 (04-11-20 @ 08:00) (30 - 30)  SpO2: 93% (04-11-20 @ 12:00) (93% - 98%)  Wt(kg): --        04-10-20 @ 07:01  -  04-11-20 @ 07:00  --------------------------------------------------------  IN: 949.5 mL / OUT: 2275 mL / NET: -1325.5 mL    04-11-20 @ 07:01  -  04-11-20 @ 15:22  --------------------------------------------------------  IN: 776.5 mL / OUT: 755 mL / NET: 21.5 mL    Physical Exam: Deferred due to COVID-19 positivity, and preservation of PPE.    LABS/STUDIES  --------------------------------------------------------------------------------              8.2    10.81 >-----------<  282      [04-11-20 @ 01:40]              25.3     132  |  94  |  99  ----------------------------<  140      [04-11-20 @ 11:57]  4.2   |  15  |  4.84        Ca     8.3     [04-11-20 @ 11:57]      Mg     2.1     [04-11-20 @ 11:57]      Phos  9.0     [04-11-20 @ 11:57]    TPro  5.8  /  Alb  1.9  /  TBili  0.3  /  DBili  x   /  AST  77  /  ALT  64  /  AlkPhos  719  [04-11-20 @ 11:57]    PT/INR: PT 12.6 , INR 1.09       [04-11-20 @ 01:40]  PTT: 34.1       [04-11-20 @ 01:40]    CK 21      [04-11-20 @ 01:40]        [04-11-20 @ 01:40]    Creatinine Trend:  SCr 4.84 [04-11 @ 11:57]  SCr 4.78 [04-11 @ 01:40]  SCr 4.63 [04-10 @ 13:43]  SCr 4.46 [04-10 @ 02:24]  SCr 5.85 [04-09 @ 17:32]        Ferritin 815      [04-11-20 @ 04:13]  Lipid: chol --, , HDL --, LDL --      [04-11-20 @ 01:40]    HBsAb <3.0      [04-10-20 @ 00:19]  HBsAg Nonreact      [04-10-20 @ 00:19]  HBcAb Nonreact      [04-10-20 @ 00:19]  HCV 0.14, Nonreact      [04-10-20 @ 00:19]

## 2020-04-11 NOTE — PROGRESS NOTE ADULT - ATTENDING COMMENTS
Intubated, sedated  1.  ARF--required HD but now diuresing with impressive results.  Trend but no RRT requirement at preent  2.  HyperPO4--Ca containing binder  3.  hypoCa--bind PO4, keep>1.1 for cardiovascular optimization

## 2020-04-12 LAB
ALBUMIN SERPL ELPH-MCNC: 2 G/DL — LOW (ref 3.3–5)
ALBUMIN SERPL ELPH-MCNC: 2.2 G/DL — LOW (ref 3.3–5)
ALP SERPL-CCNC: 776 U/L — HIGH (ref 40–120)
ALP SERPL-CCNC: 778 U/L — HIGH (ref 40–120)
ALT FLD-CCNC: 103 U/L — HIGH (ref 10–45)
ALT FLD-CCNC: 95 U/L — HIGH (ref 10–45)
ANION GAP SERPL CALC-SCNC: 22 MMOL/L — HIGH (ref 5–17)
ANION GAP SERPL CALC-SCNC: 23 MMOL/L — HIGH (ref 5–17)
AST SERPL-CCNC: 120 U/L — HIGH (ref 10–40)
AST SERPL-CCNC: 122 U/L — HIGH (ref 10–40)
BASE EXCESS BLDA CALC-SCNC: -5.5 MMOL/L — LOW (ref -2–2)
BILIRUB SERPL-MCNC: 0.3 MG/DL — SIGNIFICANT CHANGE UP (ref 0.2–1.2)
BILIRUB SERPL-MCNC: 0.3 MG/DL — SIGNIFICANT CHANGE UP (ref 0.2–1.2)
BUN SERPL-MCNC: 105 MG/DL — HIGH (ref 7–23)
BUN SERPL-MCNC: 107 MG/DL — HIGH (ref 7–23)
CALCIUM SERPL-MCNC: 7.9 MG/DL — LOW (ref 8.4–10.5)
CALCIUM SERPL-MCNC: 8.1 MG/DL — LOW (ref 8.4–10.5)
CHLORIDE SERPL-SCNC: 92 MMOL/L — LOW (ref 96–108)
CHLORIDE SERPL-SCNC: 95 MMOL/L — LOW (ref 96–108)
CO2 BLDA-SCNC: 20 MMOL/L — LOW (ref 22–30)
CO2 SERPL-SCNC: 17 MMOL/L — LOW (ref 22–31)
CO2 SERPL-SCNC: 17 MMOL/L — LOW (ref 22–31)
CREAT SERPL-MCNC: 4.75 MG/DL — HIGH (ref 0.5–1.3)
CREAT SERPL-MCNC: 5.02 MG/DL — HIGH (ref 0.5–1.3)
CRP SERPL-MCNC: 2.06 MG/DL — HIGH (ref 0–0.4)
D DIMER BLD IA.RAPID-MCNC: 1764 NG/ML DDU — HIGH
FERRITIN SERPL-MCNC: 1183 NG/ML — HIGH (ref 15–150)
GAS PNL BLDA: SIGNIFICANT CHANGE UP
GLUCOSE BLDC GLUCOMTR-MCNC: 162 MG/DL — HIGH (ref 70–99)
GLUCOSE BLDC GLUCOMTR-MCNC: 178 MG/DL — HIGH (ref 70–99)
GLUCOSE BLDC GLUCOMTR-MCNC: 191 MG/DL — HIGH (ref 70–99)
GLUCOSE BLDC GLUCOMTR-MCNC: 194 MG/DL — HIGH (ref 70–99)
GLUCOSE BLDC GLUCOMTR-MCNC: 214 MG/DL — HIGH (ref 70–99)
GLUCOSE SERPL-MCNC: 121 MG/DL — HIGH (ref 70–99)
GLUCOSE SERPL-MCNC: 194 MG/DL — HIGH (ref 70–99)
HCO3 BLDA-SCNC: 19 MMOL/L — LOW (ref 21–29)
HCT VFR BLD CALC: 25.9 % — LOW (ref 34.5–45)
HGB BLD-MCNC: 8.4 G/DL — LOW (ref 11.5–15.5)
MAGNESIUM SERPL-MCNC: 2.2 MG/DL — SIGNIFICANT CHANGE UP (ref 1.6–2.6)
MAGNESIUM SERPL-MCNC: 2.2 MG/DL — SIGNIFICANT CHANGE UP (ref 1.6–2.6)
MCHC RBC-ENTMCNC: 25.3 PG — LOW (ref 27–34)
MCHC RBC-ENTMCNC: 32.4 GM/DL — SIGNIFICANT CHANGE UP (ref 32–36)
MCV RBC AUTO: 78 FL — LOW (ref 80–100)
NRBC # BLD: 0 /100 WBCS — SIGNIFICANT CHANGE UP (ref 0–0)
PCO2 BLDA: 37 MMHG — SIGNIFICANT CHANGE UP (ref 32–46)
PH BLDA: 7.34 — LOW (ref 7.35–7.45)
PHOSPHATE SERPL-MCNC: 10.1 MG/DL — HIGH (ref 2.5–4.5)
PHOSPHATE SERPL-MCNC: 9.6 MG/DL — HIGH (ref 2.5–4.5)
PLATELET # BLD AUTO: 262 K/UL — SIGNIFICANT CHANGE UP (ref 150–400)
PO2 BLDA: 97 MMHG — SIGNIFICANT CHANGE UP (ref 74–108)
POTASSIUM SERPL-MCNC: 4.3 MMOL/L — SIGNIFICANT CHANGE UP (ref 3.5–5.3)
POTASSIUM SERPL-MCNC: 4.7 MMOL/L — SIGNIFICANT CHANGE UP (ref 3.5–5.3)
POTASSIUM SERPL-SCNC: 4.3 MMOL/L — SIGNIFICANT CHANGE UP (ref 3.5–5.3)
POTASSIUM SERPL-SCNC: 4.7 MMOL/L — SIGNIFICANT CHANGE UP (ref 3.5–5.3)
PROCALCITONIN SERPL-MCNC: 0.6 NG/ML — HIGH (ref 0.02–0.1)
PROT SERPL-MCNC: 5.4 G/DL — LOW (ref 6–8.3)
PROT SERPL-MCNC: 5.5 G/DL — LOW (ref 6–8.3)
RBC # BLD: 3.32 M/UL — LOW (ref 3.8–5.2)
RBC # FLD: 13.8 % — SIGNIFICANT CHANGE UP (ref 10.3–14.5)
SAO2 % BLDA: 97 % — HIGH (ref 92–96)
SODIUM SERPL-SCNC: 132 MMOL/L — LOW (ref 135–145)
SODIUM SERPL-SCNC: 134 MMOL/L — LOW (ref 135–145)
VANCOMYCIN FLD-MCNC: <4 UG/ML — SIGNIFICANT CHANGE UP
WBC # BLD: 9.55 K/UL — SIGNIFICANT CHANGE UP (ref 3.8–10.5)
WBC # FLD AUTO: 9.55 K/UL — SIGNIFICANT CHANGE UP (ref 3.8–10.5)

## 2020-04-12 PROCEDURE — 99291 CRITICAL CARE FIRST HOUR: CPT

## 2020-04-12 RX ORDER — DEXMEDETOMIDINE HYDROCHLORIDE IN 0.9% SODIUM CHLORIDE 4 UG/ML
0.7 INJECTION INTRAVENOUS
Qty: 200 | Refills: 0 | Status: DISCONTINUED | OUTPATIENT
Start: 2020-04-12 | End: 2020-04-14

## 2020-04-12 RX ORDER — HYDRALAZINE HCL 50 MG
50 TABLET ORAL EVERY 8 HOURS
Refills: 0 | Status: DISCONTINUED | OUTPATIENT
Start: 2020-04-12 | End: 2020-04-15

## 2020-04-12 RX ORDER — HYDRALAZINE HCL 50 MG
10 TABLET ORAL EVERY 6 HOURS
Refills: 0 | Status: DISCONTINUED | OUTPATIENT
Start: 2020-04-12 | End: 2020-04-19

## 2020-04-12 RX ORDER — BUMETANIDE 0.25 MG/ML
2 INJECTION INTRAMUSCULAR; INTRAVENOUS ONCE
Refills: 0 | Status: COMPLETED | OUTPATIENT
Start: 2020-04-12 | End: 2020-04-12

## 2020-04-12 RX ORDER — HYDROMORPHONE HYDROCHLORIDE 2 MG/ML
2 INJECTION INTRAMUSCULAR; INTRAVENOUS; SUBCUTANEOUS EVERY 6 HOURS
Refills: 0 | Status: DISCONTINUED | OUTPATIENT
Start: 2020-04-12 | End: 2020-04-12

## 2020-04-12 RX ORDER — ACETAMINOPHEN 500 MG
975 TABLET ORAL EVERY 6 HOURS
Refills: 0 | Status: DISCONTINUED | OUTPATIENT
Start: 2020-04-12 | End: 2020-04-19

## 2020-04-12 RX ADMIN — CHLORHEXIDINE GLUCONATE 15 MILLILITER(S): 213 SOLUTION TOPICAL at 17:12

## 2020-04-12 RX ADMIN — Medication 100 MILLIGRAM(S): at 17:13

## 2020-04-12 RX ADMIN — Medication 1300 MILLIGRAM(S): at 05:24

## 2020-04-12 RX ADMIN — HEPARIN SODIUM 7500 UNIT(S): 5000 INJECTION INTRAVENOUS; SUBCUTANEOUS at 05:24

## 2020-04-12 RX ADMIN — Medication 0: at 21:34

## 2020-04-12 RX ADMIN — Medication 100 MICROGRAM(S): at 21:33

## 2020-04-12 RX ADMIN — Medication 1300 MILLIGRAM(S): at 21:33

## 2020-04-12 RX ADMIN — Medication 50 MILLIGRAM(S): at 21:37

## 2020-04-12 RX ADMIN — Medication 2: at 17:57

## 2020-04-12 RX ADMIN — CHLORHEXIDINE GLUCONATE 15 MILLILITER(S): 213 SOLUTION TOPICAL at 05:25

## 2020-04-12 RX ADMIN — Medication 1 DROP(S): at 17:13

## 2020-04-12 RX ADMIN — Medication 100 MILLIGRAM(S): at 05:30

## 2020-04-12 RX ADMIN — Medication 1334 MILLIGRAM(S): at 11:42

## 2020-04-12 RX ADMIN — Medication 2 UNIT(S): at 11:42

## 2020-04-12 RX ADMIN — BUMETANIDE 2 MILLIGRAM(S): 0.25 INJECTION INTRAMUSCULAR; INTRAVENOUS at 18:15

## 2020-04-12 RX ADMIN — Medication 1 DROP(S): at 05:25

## 2020-04-12 RX ADMIN — Medication 2: at 11:43

## 2020-04-12 RX ADMIN — Medication 50 MILLIGRAM(S): at 05:24

## 2020-04-12 RX ADMIN — Medication 81 MILLIGRAM(S): at 11:42

## 2020-04-12 RX ADMIN — FAMOTIDINE 20 MILLIGRAM(S): 10 INJECTION INTRAVENOUS at 11:42

## 2020-04-12 RX ADMIN — Medication 2 UNIT(S): at 05:25

## 2020-04-12 RX ADMIN — Medication 2: at 07:42

## 2020-04-12 RX ADMIN — Medication 2 UNIT(S): at 17:57

## 2020-04-12 RX ADMIN — ATORVASTATIN CALCIUM 20 MILLIGRAM(S): 80 TABLET, FILM COATED ORAL at 21:33

## 2020-04-12 RX ADMIN — HEPARIN SODIUM 7500 UNIT(S): 5000 INJECTION INTRAVENOUS; SUBCUTANEOUS at 17:12

## 2020-04-12 RX ADMIN — Medication 1300 MILLIGRAM(S): at 14:03

## 2020-04-12 RX ADMIN — INSULIN GLARGINE 6 UNIT(S): 100 INJECTION, SOLUTION SUBCUTANEOUS at 21:34

## 2020-04-12 NOTE — PROGRESS NOTE ADULT - ASSESSMENT
Assessment and Plan:   · Assessment      66 yo female with HTN, DM, HLD, who presented with SOB and was found to be COVID+, required intubation and mechanical ventilation    Neuro   - propofol @ 40 mcg/kg/min wean as tolerated  - precedex 0.8 mcg/ kg/ hr     CV  - hydralazine 50mg [id number] OGT  - hydralazine 10 mg IV q6 PRN MAP>100  - norvasc 10mg   - asa  - atorvastatin    Pulmonary  - AC/VC 26/350/30/5 4/10, rate decreased to 26  - ABG (4/11 afternoon pending after rate decreased)  - pPeak , pPlat  - P/F = 416  -TV/IBW = 6.12    GI  - Nepro bolus feed 180ml Q 6 hours    - pepcid 20mg IV QD  - senna       - UOP 24hrs 2275  - last HD 4/9  - phos 9, phoslo BID  - bumex gtt at 2mg /hr, (metolazone 10mg given 4/10, held 4/11)    Heme   - HSQ daily 7500u q12hrs     ID  - Start anakinra x3 d 4/10  - check inflammatory markers in am   - 4/8 solumedrol 50mg IV q 8hrsuntil 4/11   - 4/7 ACTEMRA  400mg IVPB x 1 given (d/w Dr Foote ID)   - completed plaquenil   - procalcitonin 0.75  - -264    ENDO  - insulin gtt started 4/9 transition to 6u lantus and 2u humalog q6 w/ feeds on 4/11  - IV synthroid    - solumedrol to finish 4/11    Lines   - R IJ moni (4/8), L  IJ  triple lumen,   L radial A-line    Spoke to son-in-law and gave him updates on pt. All questions were answered.    Critical Care Attestation:   Critical Care Attestation:  Attending with resident/fellow:  I have personally provided 35 minutes of critical care time concurrently with the resident/fellow. This time excludes time spent on separate procedures and time spent teaching. I have reviewed the resident/fellow’s documentation and I agree with the assessment and plan of care.    Attending Attestation:   Pt seen and discussed with ICU staff, agree with above.    Neuro: propofol 40/ precedex 0.8  - Withdraws to pain but does not follow commands  - Wean sedation as long as does not cause vent dyssynchrony    Pulm: 350/ 30/5/30%, O2 sat 96%  P/F 416 from 463 from 360 from 386 from 340 from 416; Pplat 15 from 18 from 19 from 19 from 8 from 16  - ARDS improving overall  - SBT today    CV: off pressors; home norvasc and hydralazine restarted. Holding home labetalol because HR <70  - On home ASA, statin    GI: tolerating tube feeds  - On Nepro now that she is on HD; tolerating bolus feeds  - Pepcid  - Liquid BMs, bowel regimen held  - Alk phos, AST, ALT elevated, will continue to monitor; bilirubin normal    : oliguric stage 3 MARIO on CKD unknown baseline Cr (although pt's family says she has kidney problems)  - HD on 4/9  - On bumex 2mg/hr and one dose of metolazone yesterday, with good urine output (2300cc)  - Phosphate binder for hyperphosphatemia (switched sevelamer to phoslo due to sevelamer shortage)  - Continue Bumex gtt, hold metolazone today    ID: Plaquenil dosing per current guideline completed; dose of Actemra given 4/8  - PCT 0.75  - Inflammatory markers: ferritin 815 from 506 from 318; CRP 4 from 7.9 from 34; D-dimer 1901 from 1859 from 1191  - On solumedrol 3 day course for cytokine storm, added Anakinra as markers D-dimer and ferritin still increasing  - Will check tracheal aspirate (sputum) for COVID     Heme: Continue HSQ 7500 q12 per new system guidelines for chemical VTE prophylaxis in COVID patients (CrCl < 20, BMI >30)    Endo: insulin gtt, Will calculate needs and start long-acting insulin per protocol .      Electronic Signatures:  Tiny Brown)  (Signed 11-Apr-2020 13:20)  	Authored: Assessment and Plan  Jack Pina)  (Signed 11-Apr-2020 21:22)  	Authored: Critical Care Attestation, Attending Attestation  	Co-Signer: Assessment and Plan  Spring Jamison)  (Signed 11-Apr-2020 01:38)  	Authored: Progress Note, Reason for Admission, Subjective and Objective, Assessment and Plan  Assessment and Plan:   · Assessment      66 yo female with HTN, DM, HLD, who presented with SOB and was found to be COVID+, required intubation and mechanical ventilation    Neuro   - propofol @ 40 mcg/kg/min wean as tolerated  - precedex 0.8 mcg/ kg/ hr     CV  - hydralazine 50mg [id number] OGT  - hydralazine 10 mg IV q6 PRN MAP>100  - norvasc 10mg   - asa  - atorvastatin    Pulmonary  - AC/VC 26/350/30/5 4/10, rate decreased to 26  - ABG (4/11 afternoon pending after rate decreased)  - pPeak , pPlat  - P/F = 393  -TV/IBW = 6.12    GI  - Nepro bolus feed 180ml Q 6 hours    - pepcid 20mg IV QD  - senna       - UOP 24hrs 2275  - last HD 4/9  - phos 9, phoslo BID  - bumex gtt at 2mg /hr, (metolazone 10mg given 4/10, held 4/11)    Heme   - HSQ daily 7500u q12hrs     ID  - Start anakinra x3 d 4/10  - check inflammatory markers in am   - 4/8 solumedrol 50mg IV q 8hrsuntil 4/11   - 4/7 ACTEMRA  400mg IVPB x 1 given (d/w Dr Foote ID)   - completed plaquenil   - procalcitonin 0.75  - -264    ENDO  - insulin gtt started 4/9 transition to 6u lantus and 2u humalog q6 w/ feeds on 4/11  - IV synthroid    - solumedrol to finish 4/11    Lines   - R IJ moni (4/8), L  IJ  triple lumen,   L radial A-line    Spoke to son-in-law and gave him updates on pt. All questions were answered.    Critical Care Attestation:   Critical Care Attestation:  Attending with resident/fellow:  I have personally provided 35 minutes of critical care time concurrently with the resident/fellow. This time excludes time spent on separate procedures and time spent teaching. I have reviewed the resident/fellow’s documentation and I agree with the assessment and plan of care.    Attending Attestation:   Pt seen and discussed with ICU staff, agree with above.    Neuro: propofol 40/ precedex 0.8  - Withdraws to pain but does not follow commands  - Wean sedation as long as does not cause vent dyssynchrony    Pulm: 350/ 30/5/30%, O2 sat 96%  P/F 416 from 463 from 360 from 386 from 340 from 416; Pplat 15 from 18 from 19 from 19 from 8 from 16  - ARDS improving overall  - SBT today    CV: off pressors; home norvasc and hydralazine restarted. Holding home labetalol because HR <70  - On home ASA, statin    GI: tolerating tube feeds  - On Nepro now that she is on HD; tolerating bolus feeds  - Pepcid  - Liquid BMs, bowel regimen held  - Alk phos, AST, ALT elevated, will continue to monitor; bilirubin normal    : oliguric stage 3 MARIO on CKD unknown baseline Cr (although pt's family says she has kidney problems)  - HD on 4/9  - On bumex 2mg/hr and one dose of metolazone yesterday, with good urine output (2300cc)  - Phosphate binder for hyperphosphatemia (switched sevelamer to phoslo due to sevelamer shortage)  - Continue Bumex gtt, hold metolazone today    ID: Plaquenil dosing per current guideline completed; dose of Actemra given 4/8  - PCT 0.75  - Inflammatory markers: ferritin 815 from 506 from 318; CRP 4 from 7.9 from 34; D-dimer 1901 from 1859 from 1191  - On solumedrol 3 day course for cytokine storm, added Anakinra as markers D-dimer and ferritin still increasing  - Will check tracheal aspirate (sputum) for COVID     Heme: Continue HSQ 7500 q12 per new system guidelines for chemical VTE prophylaxis in COVID patients (CrCl < 20, BMI >30)    Endo: insulin gtt, Will calculate needs and start long-acting insulin per protocol .      Electronic Signatures:  Tiny Brown)  (Signed 11-Apr-2020 13:20)  	Authored: Assessment and Plan  Jack Pina)  (Signed 11-Apr-2020 21:22)  	Authored: Critical Care Attestation, Attending Attestation  	Co-Signer: Assessment and Plan  Spring Jamison)  (Signed 11-Apr-2020 01:38)  	Authored: Progress Note, Reason for Admission, Subjective and Objective, Assessment and Plan Assessment and Plan:   · Assessment		  64 yo female with HTN, DM, HLD, who presented with SOB and was found to be COVID+, required intubation and mechanical ventilation    Neuro   - propofol @ 10 mcg/kg/min wean off today  - precedex 1 mcg/ kg/ hr     CV  - soft MAP today around 65, PO BP meds on hold  - hydralazine 50mg [id number] OGT  - hydralazine 10 mg IV q6 PRN MAP>100  - norvasc 10mg   - asa  - atorvastatin    Pulmonary  - AC/VC 26/350/30/5  - ABG 7.34/37/97/19  - pPeak , pPlat  -TV/IBW = 6.12    GI  - Nepro bolus feed 180ml Q 6 hours    - d/c rectal tube today,   - pepcid 20mg IV QD  - senna       - last HD 4/9  - phos 10.1, phoslo BID  - bumex gtt at 2mg /hr, (metolazone 10mg given 4/10, held 4/11&4/12)    Heme   - HSQ daily 7500u q12hrs     ID  - Start anakinra 100 BID x3 d 4/10, order 100 daily x3d on 4/13  - check inflammatory markers daily  - 4/8 solumedrol 50mg IV q 8hrs (until 4/11)   - 4/7 ACTEMRA  400mg IVPB x 1 given (d/w Dr Foote ID)   - completed plaquenil   - procalcitonin 0.60    ENDO  -  insulin gtt started 4/9 transition to 6u lantus and 2u humalog q6 w/ feeds on 4/11  - IV synthroid    - solumedrol finished on 4/11  - (04/11) off Insulin gtt; lantus started 6units hs   - (04/11) -140's    Lines   - R IJ shiley (4/8), L  IJ  triple lumen (4/3),   L radial A-line Assessment and Plan:   · Assessment		  64 yo female with HTN, DM, HLD, who presented with SOB and was found to be COVID+, required intubation and mechanical ventilation    Neuro   - propofol @ 10 mcg/kg/min wean off today  - precedex 1 mcg/ kg/ hr     CV  - soft MAP today around 65  - hydralazine and Norvasc PO with holding parameters  - hydralazine 10 mg IV q6 PRN MAP>100  - asa  - atorvastatin    Pulmonary  - AC/VC 26/350/30/5  - ABG 7.34/37/97/19  - pPeak 25, pPlat  -TV/IBW = 6.12    GI  - Nepro bolus feed 180ml Q 6 hours    - d/c rectal tube today,   - pepcid 20mg IV QD  - senna       - last HD 4/9, no HD today per renal  - phos 10.1, phoslo BID  - bumex gtt d/c'ed (metolazone 10mg given 4/10, held 4/11&4/12)  - will continue monitoring UO and bumex IVP prn    Heme   - HSQ daily 7500u q12hrs     ID  - Start anakinra 100 BID x3 d 4/10, order 100 daily x3d on 4/13  - check inflammatory markers daily  - 4/8 solumedrol 50mg IV q 8hrs (until 4/11)   - 4/7 ACTEMRA  400mg IVPB x 1 given (d/w Dr Foote ID)   - completed plaquenil   - procalcitonin 0.60    ENDO  -  insulin gtt started 4/9 transition to 6u lantus and 2u humalog q6 w/ feeds on 4/11  - IV synthroid    - solumedrol finished on 4/11  - (04/11) off Insulin gtt; lantus started 6units hs   - (04/11) -140's    Lines   - R IJ shiley (4/8), L  IJ  triple lumen (4/3),   L radial A-line

## 2020-04-12 NOTE — PROGRESS NOTE ADULT - ATTENDING COMMENTS
Pt seen and discussed with ICU staff, agree with above.    Neuro: propofol 30/ precedex 1  - Withdraws to pain but does not follow commands  - Started oxycodone to prevent narcotic withdrawal  - Seroquel for agitation  - Wean sedation as long as does not cause vent dyssynchrony    Pulm: 350/ 26/5/30%, O2 sat 96%  P/F 323 from 416 from 463 from 360 from 386 from 340 from 416  Pplat 15 from 18 from 19 from 19 from 8 from 16  - ARDS improving overall  - SBT today    CV: off pressors; home norvasc and hydralazine restarted. Holding home labetalol because HR <70  - On home ASA, statin    GI: tolerating tube feeds  - On Nepro now that she is on HD; tolerating bolus feeds  - Pepcid  - Liquid BMs, bowel regimen held  - Alk phos, AST, ALT elevated, will continue to monitor; bilirubin normal    : oliguric stage 3 MARIO on CKD unknown baseline Cr (although pt's family says she has kidney problems)  - HD on 4/9  - approximately net even for 24 hours on bumex 2mg/hr  - Will d/c Bumex today and if urine output decreases, will try lasix or metolazone  - Phosphate binder for hyperphosphatemia     ID: Plaquenil dosing per current guideline completed; dose of Actemra given 4/8  - WBC 9, PCT 0.6 from 0.75; mildly febrile  - If febrile, will send blood cultures  - Place peripheral IVs and remove TLC  - Inflammatory markers: ferritin 1183 from 815 from 506 from 318; CRP 2 from 4 from 7.9 from 34; D-dimer 1764 from 1901 from 1859 from 1191  - Completed solumedrol 3 day course for cytokine storm,   - Anakinra to continue for full course given some improvement in inflammatory markers  - Tracheal aspirate (sputum) for COVID pending    Heme: Continue HSQ 7500 q12 per new system guidelines for chemical VTE prophylaxis in COVID patients (CrCl < 20, BMI >30)    Endo: off insulin gtt, on lantus 6units QHS and SSI

## 2020-04-12 NOTE — PROGRESS NOTE ADULT - SUBJECTIVE AND OBJECTIVE BOX
HISTORY  65y Female with COVID-19(+) pneumonia requiring intubation.    24 HOUR EVENTS:    SUBJECTIVE/ROS: Due to intubation with sedation, subjective information was not able to be obtained from the patient. History was obtained, to the extent possible, from review of the chart and collateral sources of information.      NEURO  Exam: sedated, no acute distress  Meds: acetaminophen    Suspension .. 650 milliGRAM(s) Enteral Tube every 6 hours PRN Temp greater or equal to 38C (100.4F)  dexMEDEtomidine Infusion 0.5 MICROgram(s)/kG/Hr IV Continuous <Continuous>  propofol Infusion 45 MICROgram(s)/kG/Min IV Continuous <Continuous>    Adequacy of sedation and pain control has been assessed and adjusted.      RESPIRATORY  RR: 30 (30 - 30)  SpO2: 98% (93% - 98%)  Exam: without dissynchrony  Mechanical Ventilation: Mode: AC/ CMV (Assist Control/ Continuous Mandatory Ventilation), RR (machine): 26, TV (machine): 350, FiO2: 40, PEEP: 5  Predicted Body Weight:  Tidal Volume/PBW ratio:  PaO2 to FiO2 ratio: 393  Extubation readiness assessed.  ABG - ( 11 Apr 2020 15:38 )  pH: 7.33  /  pCO2: 36    /  pO2: 118   / HCO3: 18    / Base Excess: -6.4  /  SaO2: 98      Lactate: x                CARDIOVASCULAR  HR: 69 (43 - 69)  ABP: 151/63 (115/46 - 163/61)  ABP(mean): 92 (67 - 99)  Cardiac Rhythm: sinus  Most recent QTc:   Meds: amLODIPine   Tablet  buMETAnide Infusion (10 mL/Hr)  hydrALAZINE  hydrALAZINE Injectable PRN        GI/NUTRITION  Diet: Diet, NPO with Tube Feed:   Tube Feeding Modality: Orogastric  Nepro with Carb Steady (NEPRORTH)  Total Volume for 24 Hours (mL): 720  Bolus  Total Volume of Bolus (mL):  180  Tube Feed Frequency: Every 6 hours   Tube Feed Start Time: 12:00  Bolus Feed Rate (mL per Hour): 180   Bolus Feed Duration (in Hours): 1 (04-10-20 @ 14:30)    Most recent bowel movement:  Meds/stress ulcer prophylaxis: calcium acetate 1334 milliGRAM(s) Oral two times a day with meals  dextrose 5%. 1000 milliLiter(s) IV Continuous <Continuous>  famotidine Injectable 20 milliGRAM(s) IV Push daily  sodium bicarbonate 1300 milliGRAM(s) Oral three times a day        GENITOURINARY  I&O's Detail    04-10 @ 07:01 - 04-11 @ 07:00  --------------------------------------------------------  IN:    bumetanide Infusion: 110 mL    dexmedetomidine Infusion: 275 mL    Enteral Tube Flush: 120 mL    insulin regular Infusion: 12.5 mL    Nepro with Carb Steady: 120 mL    propofol Infusion: 312 mL  Total IN: 949.5 mL    OUT:    Indwelling Catheter - Urethral: 2275 mL  Total OUT: 2275 mL    Total NET: -1325.5 mL      04-11 @ 07:01 - 04-12 @ 00:22  --------------------------------------------------------  IN:    bumetanide Infusion: 160 mL    dexmedetomidine Infusion: 380 mL    Enteral Tube Flush: 60 mL    insulin regular Infusion: 34.5 mL    Nepro with Carb Steady: 180 mL    propofol Infusion: 408 mL    Solution: 50 mL  Total IN: 1272.5 mL    OUT:    Indwelling Catheter - Urethral: 1370 mL  Total OUT: 1370 mL    Total NET: -97.5 mL          04-11    132<L>  |  94<L>  |  99<H>  ----------------------------<  140<H>  4.2   |  15<L>  |  4.84<H>    Ca    8.3<L>      11 Apr 2020 11:57  Phos  9.0     04-11  Mg     2.1     04-11    TPro  5.8<L>  /  Alb  1.9<L>  /  TBili  0.3  /  DBili  x   /  AST  77<H>  /  ALT  64<H>  /  AlkPhos  719<H>  04-11    [x] Glass catheter, indication: urine output monitoring in critically ill patient.  Meds: calcium acetate 1334 milliGRAM(s) Oral two times a day with meals  dextrose 5%. 1000 milliLiter(s) IV Continuous <Continuous>  sodium bicarbonate 1300 milliGRAM(s) Oral three times a day    Creatine Kinase, Serum: 21 (04-11-20 @ 01:40)        HEMATOLOGIC  Meds/VTE prophylaxis: aspirin  chewable 81 milliGRAM(s) Enteral Tube daily  heparin  Injectable 7500 Unit(s) SubCutaneous every 12 hours                          8.2    10.81 )-----------( 282      ( 11 Apr 2020 01:40 )             25.3     PT/INR - ( 11 Apr 2020 01:40 )   PT: 12.6 sec;   INR: 1.09 ratio         PTT - ( 11 Apr 2020 01:40 )  PTT:34.1 sec  38016-81-39 @ 04:13        INFECTIOUS DISEASES  T(C): 33.9, Max: 37 (04-11-20 @ 04:00)  WBC Count: 10.81 (04-11-20 @ 01:40)  WBC Count: 6.81 (04-10-20 @ 02:24)    Recent Cultures:    Meds:   Procalcitonin, Serum: 0.62 ng/mL (04-11-20 @ 01:40)        ENDOCRINE  Fingersticks: 126, 145, 232, 138, 136  Meds: atorvastatin 20 milliGRAM(s) Oral at bedtime  dextrose 40% Gel 15 Gram(s) Oral once PRN  dextrose 50% Injectable 12.5 Gram(s) IV Push once  dextrose 50% Injectable 25 Gram(s) IV Push once  dextrose 50% Injectable 25 Gram(s) IV Push once  glucagon  Injectable 1 milliGRAM(s) IntraMuscular once PRN  insulin glargine Injectable (LANTUS) 6 Unit(s) SubCutaneous at bedtime  insulin lispro (HumaLOG) corrective regimen sliding scale   SubCutaneous three times a day before meals  insulin lispro (HumaLOG) corrective regimen sliding scale   SubCutaneous at bedtime  insulin lispro Injectable (HumaLOG) 2 Unit(s) SubCutaneous every 6 hours  insulin regular Infusion 2 Unit(s)/Hr IV Continuous <Continuous>  levothyroxine Injectable 100 MICROGram(s) IV Push at bedtime        ACCESS DEVICES:  [ ] Peripheral IV  [x] Central Venous Line	[ ] R	[ ] L	[ ] IJ	[ ] Fem	[ ] SC	Placed:   [x] Arterial Line		[ ] R	[ ] L	[ ] Fem	[ ] Rad	[ ] Ax	Placed:   [ ] Sae HD Access             [ ] R [ ] L [ ] IJ  [ ] Fem     Placed:  [x] Urinary Catheter, Date Placed:   Necessity of urinary, arterial, and venous catheters discussed.      CODE STATUS:     IMAGING:

## 2020-04-13 LAB
4/8 RATIO: 2.47 RATIO — SIGNIFICANT CHANGE UP (ref 0.9–3.6)
ABS CD8: 130 /UL — LOW (ref 142–740)
ALBUMIN SERPL ELPH-MCNC: 1.9 G/DL — LOW (ref 3.3–5)
ALBUMIN SERPL ELPH-MCNC: 2 G/DL — LOW (ref 3.3–5)
ALP SERPL-CCNC: 815 U/L — HIGH (ref 40–120)
ALP SERPL-CCNC: 843 U/L — HIGH (ref 40–120)
ALT FLD-CCNC: 135 U/L — HIGH (ref 10–45)
ALT FLD-CCNC: 150 U/L — HIGH (ref 10–45)
ANION GAP SERPL CALC-SCNC: 22 MMOL/L — HIGH (ref 5–17)
ANION GAP SERPL CALC-SCNC: 24 MMOL/L — HIGH (ref 5–17)
APPEARANCE UR: ABNORMAL
APTT BLD: 50.5 SEC — HIGH (ref 27.5–36.3)
AST SERPL-CCNC: 175 U/L — HIGH (ref 10–40)
AST SERPL-CCNC: 188 U/L — HIGH (ref 10–40)
BACTERIA # UR AUTO: NEGATIVE — SIGNIFICANT CHANGE UP
BASOPHILS # BLD AUTO: 0.02 K/UL — SIGNIFICANT CHANGE UP (ref 0–0.2)
BASOPHILS NFR BLD AUTO: 0.2 % — SIGNIFICANT CHANGE UP (ref 0–2)
BILIRUB SERPL-MCNC: 0.3 MG/DL — SIGNIFICANT CHANGE UP (ref 0.2–1.2)
BILIRUB SERPL-MCNC: 0.4 MG/DL — SIGNIFICANT CHANGE UP (ref 0.2–1.2)
BILIRUB UR-MCNC: NEGATIVE — SIGNIFICANT CHANGE UP
BUN SERPL-MCNC: 113 MG/DL — HIGH (ref 7–23)
BUN SERPL-MCNC: 114 MG/DL — HIGH (ref 7–23)
CALCIUM SERPL-MCNC: 8 MG/DL — LOW (ref 8.4–10.5)
CALCIUM SERPL-MCNC: 8.3 MG/DL — LOW (ref 8.4–10.5)
CD3 BLASTS SPEC-ACNC: 453 /UL — LOW (ref 672–1870)
CD3 BLASTS SPEC-ACNC: 77 % — SIGNIFICANT CHANGE UP (ref 59–83)
CD4 %: 54 % — SIGNIFICANT CHANGE UP (ref 30–62)
CD8 %: 22 % — SIGNIFICANT CHANGE UP (ref 12–36)
CHLORIDE SERPL-SCNC: 93 MMOL/L — LOW (ref 96–108)
CHLORIDE SERPL-SCNC: 94 MMOL/L — LOW (ref 96–108)
CK SERPL-CCNC: 123 U/L — SIGNIFICANT CHANGE UP (ref 25–170)
CO2 SERPL-SCNC: 17 MMOL/L — LOW (ref 22–31)
CO2 SERPL-SCNC: 17 MMOL/L — LOW (ref 22–31)
COLOR SPEC: YELLOW — SIGNIFICANT CHANGE UP
COMMENT - URINE: SIGNIFICANT CHANGE UP
CREAT SERPL-MCNC: 5.13 MG/DL — HIGH (ref 0.5–1.3)
CREAT SERPL-MCNC: 5.31 MG/DL — HIGH (ref 0.5–1.3)
CRP SERPL-MCNC: 2.28 MG/DL — HIGH (ref 0–0.4)
D DIMER BLD IA.RAPID-MCNC: 1582 NG/ML DDU — HIGH
D DIMER BLD IA.RAPID-MCNC: 1706 NG/ML DDU — HIGH
DIFF PNL FLD: NEGATIVE — SIGNIFICANT CHANGE UP
EOSINOPHIL # BLD AUTO: 0.03 K/UL — SIGNIFICANT CHANGE UP (ref 0–0.5)
EOSINOPHIL NFR BLD AUTO: 0.2 % — SIGNIFICANT CHANGE UP (ref 0–6)
EPI CELLS # UR: 3 /HPF — SIGNIFICANT CHANGE UP
FERRITIN SERPL-MCNC: 1516 NG/ML — HIGH (ref 15–150)
GAS PNL BLDA: SIGNIFICANT CHANGE UP
GLUCOSE BLDC GLUCOMTR-MCNC: 162 MG/DL — HIGH (ref 70–99)
GLUCOSE BLDC GLUCOMTR-MCNC: 185 MG/DL — HIGH (ref 70–99)
GLUCOSE BLDC GLUCOMTR-MCNC: 244 MG/DL — HIGH (ref 70–99)
GLUCOSE SERPL-MCNC: 209 MG/DL — HIGH (ref 70–99)
GLUCOSE SERPL-MCNC: 210 MG/DL — HIGH (ref 70–99)
GLUCOSE UR QL: ABNORMAL
HCT VFR BLD CALC: 23.7 % — LOW (ref 34.5–45)
HGB BLD-MCNC: 7.3 G/DL — LOW (ref 11.5–15.5)
HYALINE CASTS # UR AUTO: 10 /LPF — HIGH (ref 0–2)
IMM GRANULOCYTES NFR BLD AUTO: 1.3 % — SIGNIFICANT CHANGE UP (ref 0–1.5)
INR BLD: 1.08 RATIO — SIGNIFICANT CHANGE UP (ref 0.88–1.16)
KETONES UR-MCNC: NEGATIVE — SIGNIFICANT CHANGE UP
LDH SERPL L TO P-CCNC: 318 U/L — HIGH (ref 50–242)
LEUKOCYTE ESTERASE UR-ACNC: ABNORMAL
LYMPHOCYTES # BLD AUTO: 0.71 K/UL — LOW (ref 1–3.3)
LYMPHOCYTES # BLD AUTO: 5.6 % — LOW (ref 13–44)
MAGNESIUM SERPL-MCNC: 2.3 MG/DL — SIGNIFICANT CHANGE UP (ref 1.6–2.6)
MAGNESIUM SERPL-MCNC: 2.3 MG/DL — SIGNIFICANT CHANGE UP (ref 1.6–2.6)
MCHC RBC-ENTMCNC: 24.9 PG — LOW (ref 27–34)
MCHC RBC-ENTMCNC: 30.8 GM/DL — LOW (ref 32–36)
MCV RBC AUTO: 80.9 FL — SIGNIFICANT CHANGE UP (ref 80–100)
MONOCYTES # BLD AUTO: 0.93 K/UL — HIGH (ref 0–0.9)
MONOCYTES NFR BLD AUTO: 7.3 % — SIGNIFICANT CHANGE UP (ref 2–14)
NEUTROPHILS # BLD AUTO: 10.9 K/UL — HIGH (ref 1.8–7.4)
NEUTROPHILS NFR BLD AUTO: 85.4 % — HIGH (ref 43–77)
NITRITE UR-MCNC: NEGATIVE — SIGNIFICANT CHANGE UP
NRBC # BLD: 0 /100 WBCS — SIGNIFICANT CHANGE UP (ref 0–0)
PH UR: 5.5 — SIGNIFICANT CHANGE UP (ref 5–8)
PHOSPHATE SERPL-MCNC: 8.3 MG/DL — HIGH (ref 2.5–4.5)
PHOSPHATE SERPL-MCNC: 9 MG/DL — HIGH (ref 2.5–4.5)
PLATELET # BLD AUTO: 216 K/UL — SIGNIFICANT CHANGE UP (ref 150–400)
POTASSIUM SERPL-MCNC: 3.5 MMOL/L — SIGNIFICANT CHANGE UP (ref 3.5–5.3)
POTASSIUM SERPL-MCNC: 3.7 MMOL/L — SIGNIFICANT CHANGE UP (ref 3.5–5.3)
POTASSIUM SERPL-SCNC: 3.5 MMOL/L — SIGNIFICANT CHANGE UP (ref 3.5–5.3)
POTASSIUM SERPL-SCNC: 3.7 MMOL/L — SIGNIFICANT CHANGE UP (ref 3.5–5.3)
PROCALCITONIN SERPL-MCNC: 0.84 NG/ML — HIGH (ref 0.02–0.1)
PROT SERPL-MCNC: 5.2 G/DL — LOW (ref 6–8.3)
PROT SERPL-MCNC: 5.2 G/DL — LOW (ref 6–8.3)
PROT UR-MCNC: ABNORMAL
PROTHROM AB SERPL-ACNC: 12.5 SEC — SIGNIFICANT CHANGE UP (ref 10–12.9)
RBC # BLD: 2.93 M/UL — LOW (ref 3.8–5.2)
RBC # FLD: 14.3 % — SIGNIFICANT CHANGE UP (ref 10.3–14.5)
RBC CASTS # UR COMP ASSIST: 2 /HPF — SIGNIFICANT CHANGE UP (ref 0–4)
SARS-COV-2 RNA SPEC QL NAA+PROBE: DETECTED
SODIUM SERPL-SCNC: 132 MMOL/L — LOW (ref 135–145)
SODIUM SERPL-SCNC: 135 MMOL/L — SIGNIFICANT CHANGE UP (ref 135–145)
SP GR SPEC: 1.01 — SIGNIFICANT CHANGE UP (ref 1.01–1.02)
T-CELL CD4 SUBSET PNL BLD: 321 /UL — LOW (ref 489–1457)
TRIGL SERPL-MCNC: 180 MG/DL — HIGH (ref 10–149)
TROPONIN T, HIGH SENSITIVITY RESULT: 78 NG/L — HIGH (ref 0–51)
UROBILINOGEN FLD QL: NEGATIVE — SIGNIFICANT CHANGE UP
WBC # BLD: 12.76 K/UL — HIGH (ref 3.8–10.5)
WBC # FLD AUTO: 12.76 K/UL — HIGH (ref 3.8–10.5)
WBC UR QL: 49 /HPF — HIGH (ref 0–5)

## 2020-04-13 PROCEDURE — 99233 SBSQ HOSP IP/OBS HIGH 50: CPT

## 2020-04-13 PROCEDURE — 71045 X-RAY EXAM CHEST 1 VIEW: CPT | Mod: 26

## 2020-04-13 PROCEDURE — 99291 CRITICAL CARE FIRST HOUR: CPT

## 2020-04-13 RX ORDER — SENNA PLUS 8.6 MG/1
10 TABLET ORAL AT BEDTIME
Refills: 0 | Status: DISCONTINUED | OUTPATIENT
Start: 2020-04-13 | End: 2020-04-19

## 2020-04-13 RX ORDER — POLYETHYLENE GLYCOL 3350 17 G/17G
17 POWDER, FOR SOLUTION ORAL
Refills: 0 | Status: DISCONTINUED | OUTPATIENT
Start: 2020-04-13 | End: 2020-04-16

## 2020-04-13 RX ORDER — INSULIN GLARGINE 100 [IU]/ML
8 INJECTION, SOLUTION SUBCUTANEOUS AT BEDTIME
Refills: 0 | Status: DISCONTINUED | OUTPATIENT
Start: 2020-04-13 | End: 2020-04-16

## 2020-04-13 RX ORDER — VANCOMYCIN HCL 1 G
1000 VIAL (EA) INTRAVENOUS ONCE
Refills: 0 | Status: COMPLETED | OUTPATIENT
Start: 2020-04-13 | End: 2020-04-13

## 2020-04-13 RX ORDER — INSULIN LISPRO 100/ML
VIAL (ML) SUBCUTANEOUS EVERY 6 HOURS
Refills: 0 | Status: DISCONTINUED | OUTPATIENT
Start: 2020-04-13 | End: 2020-04-19

## 2020-04-13 RX ORDER — HYDROMORPHONE HYDROCHLORIDE 2 MG/ML
0.5 INJECTION INTRAMUSCULAR; INTRAVENOUS; SUBCUTANEOUS EVERY 4 HOURS
Refills: 0 | Status: DISCONTINUED | OUTPATIENT
Start: 2020-04-13 | End: 2020-04-15

## 2020-04-13 RX ORDER — MEROPENEM 1 G/30ML
500 INJECTION INTRAVENOUS EVERY 24 HOURS
Refills: 0 | Status: COMPLETED | OUTPATIENT
Start: 2020-04-13 | End: 2020-04-19

## 2020-04-13 RX ORDER — CALCIUM GLUCONATE 100 MG/ML
2 VIAL (ML) INTRAVENOUS ONCE
Refills: 0 | Status: COMPLETED | OUTPATIENT
Start: 2020-04-13 | End: 2020-04-13

## 2020-04-13 RX ADMIN — Medication 1 DROP(S): at 17:06

## 2020-04-13 RX ADMIN — HEPARIN SODIUM 7500 UNIT(S): 5000 INJECTION INTRAVENOUS; SUBCUTANEOUS at 06:35

## 2020-04-13 RX ADMIN — CHLORHEXIDINE GLUCONATE 15 MILLILITER(S): 213 SOLUTION TOPICAL at 17:14

## 2020-04-13 RX ADMIN — HEPARIN SODIUM 7500 UNIT(S): 5000 INJECTION INTRAVENOUS; SUBCUTANEOUS at 17:07

## 2020-04-13 RX ADMIN — Medication 81 MILLIGRAM(S): at 10:54

## 2020-04-13 RX ADMIN — Medication 50 MILLIGRAM(S): at 06:37

## 2020-04-13 RX ADMIN — POLYETHYLENE GLYCOL 3350 17 GRAM(S): 17 POWDER, FOR SOLUTION ORAL at 17:10

## 2020-04-13 RX ADMIN — CHLORHEXIDINE GLUCONATE 15 MILLILITER(S): 213 SOLUTION TOPICAL at 06:37

## 2020-04-13 RX ADMIN — FAMOTIDINE 20 MILLIGRAM(S): 10 INJECTION INTRAVENOUS at 10:54

## 2020-04-13 RX ADMIN — Medication 1334 MILLIGRAM(S): at 11:43

## 2020-04-13 RX ADMIN — MEROPENEM 100 MILLIGRAM(S): 1 INJECTION INTRAVENOUS at 10:03

## 2020-04-13 RX ADMIN — Medication 50 MILLIGRAM(S): at 13:41

## 2020-04-13 RX ADMIN — Medication 1334 MILLIGRAM(S): at 00:38

## 2020-04-13 RX ADMIN — AMLODIPINE BESYLATE 10 MILLIGRAM(S): 2.5 TABLET ORAL at 06:36

## 2020-04-13 RX ADMIN — Medication 4: at 10:56

## 2020-04-13 RX ADMIN — Medication 1300 MILLIGRAM(S): at 13:41

## 2020-04-13 RX ADMIN — Medication 100 MILLIGRAM(S): at 06:36

## 2020-04-13 RX ADMIN — Medication 1 DROP(S): at 06:36

## 2020-04-13 RX ADMIN — Medication 1300 MILLIGRAM(S): at 06:35

## 2020-04-13 RX ADMIN — Medication 2: at 06:37

## 2020-04-13 RX ADMIN — Medication 100 MILLIGRAM(S): at 17:06

## 2020-04-13 RX ADMIN — Medication 250 MILLIGRAM(S): at 09:34

## 2020-04-13 RX ADMIN — Medication 200 GRAM(S): at 09:34

## 2020-04-13 RX ADMIN — Medication 2: at 17:09

## 2020-04-13 NOTE — PROGRESS NOTE ADULT - ASSESSMENT
Neuro   - precedex 0.5 mcg/ kg/ hr     CV  - MAP 88  - hydralazine 50mg [id number] and norvasc 10mg OGT with holding parameter  - hydralazine 10 mg IV q6 PRN MAP>100    Pulmonary  - AC/VC 26/350/30/5  - ABG 7.33/37/585212  - pPeak , pPlat 15  -TV/IBW = 6.12    GI  - Nepro bolus feed 180ml Q 6 hours    - pepcid 20mg IV QD  - senna       - HD 4/13 per renal  - phos 9.6, phoslo BID  - bumex gtt d/c'ed, (metolazone 10mg given 4/10, held 4/11&4/12)    Heme   - HSQ daily 7500u q12hrs     ID  - Start anakinra 100 BID x3 d 4/10, order 100 daily x3d on 4/13  - check inflammatory markers daily  - 4/8 solumedrol 50mg IV q 8hrs (until 4/11)   - 4/7 ACTEMRA  400mg IVPB x 1 given (d/w Dr Foote ID)   - completed plaquenil   - procalcitonin 0.60 to 0.84 today    ENDO  -  6u lantus q6 w/ feeds on 4/11 and SS  - IV synthroid    - (04/13) -214    Lines   - R IJ shiley (4/8), L  IJ  triple lumen (4/3),   L radial A-line Neuro   - precedex 0.5 mcg/ kg/ hr     CV  - MAP 88  - hydralazine 50mg [id number] and norvasc 10mg OGT with holding parameter  - hydralazine 10 mg IV q6 PRN MAP>100    Pulmonary  - AC/VC 26/350/30/5  - ABG 7.33/37/128/19  - pPlat 15  -TV/IBW = 6.12  - will trial of CPAP today    GI  - Nepro bolus feed 180ml Q 6 hours    - pepcid 20mg IV QD  - senna       - net positive 702 ml for 24 hours since bumex gtt d/c'ed  - HD 4/13 per renal  - phos 9.6, phoslo BID    Heme   - HSQ daily 7500u q12hrs     ID  - slight increase in procalcitonin (0.60 to 0.84 today) and increase WBC, concerns for possible line sepsis, BCx from central line sent, Vanco renal dosing daily by level and meropenum started today  - anakinra stopped due to line sepsis  - f/u UA  - will get peripheral IVs and d/c central line  - check inflammatory markers daily  - 4/8 solumedrol 50mg IV q 8hrs (until 4/11)   - 4/7 ACTEMRA  400mg IVPB x 1 given (d/w Dr Foote ID)   - completed plaquenil   - repeat COVID test sent yesterday, will f/u results    ENDO  - elevated BG, lantus increased from 6u QHS to 8u QHS  - continue HISS  - IV synthroid      Lines   - R IJ shiley (4/8), L  IJ  triple lumen (4/3-4/13),   L radial A-line

## 2020-04-13 NOTE — CHART NOTE - NSCHARTNOTEFT_GEN_A_CORE
Patient has increasing procalcitonin and WBC and Lt IJ TLC had been placed for 10 days. There is concern for line sepsis. Lt IJ central line was removed under standard precaution. Patient tolerated procedure well. No complication noted. Tegaderm dressing was applied.

## 2020-04-13 NOTE — PROGRESS NOTE ADULT - ASSESSMENT
65F PMHx DM2, HTN, HLD who present to ED for fever/dyspnea x 1 week.  Per HPI, pt desaturated at home O2 sat 70% which improved nasal cannula (another family member).  In ED hypoxia 70s on nasal cannula s/p intubation.  Covid positive with CXR b/l opacities. Given ceftriaxone, azithromycin, hydroxychlorquine.     Nephrology consulted for MARIO or MARIO on CKD.       MARIO    likely 2/2 ATN in setting septic shock Covid19 infection, low BP  on admission sCr 3.93 on 4/3/2020, has been uptrending since.  Exact duration of elevated Scr however unknown, no prior labs available for review  - Plan for HD today  - Send UA, urine electrolytes, spot urine TP/CR. Agree with Glass catheter placement.  obtain renal U/S when feasible.  - Monitor labs and urine output. Avoid NSAIDs, ACEI/ARBS, RCA and nephrotoxins. Dose medications as per eGFR.        Metabolic acidosis   likely 2/2 MARIO ATN in setting septic shock covid19  HD today  continue sodium bicarb 1300 PO TID  monitor daily    Hyperphosphatemia  in the setting of MARIO   continue calcium acetate 2 tabs TID with meals  Check intact PTH level. Monitor serum phosphorus    Hypocalcemia   in the setting of hyperphosphatemia and hypoalbuminemia  - monitor ionized calcium, replete to keep >1.1

## 2020-04-13 NOTE — PROGRESS NOTE ADULT - ATTENDING COMMENTS
Pt seen and examined. Chart reviewed. Resident note confirmed. Pt is a 65 year old female with a medical history significant for CAD/HTN/CKD/DM2 who presented to [facility] with covid-19 infection. She clinically deteriortated and was intubated. Pt was admitted to the PICU for mgmt.    Pt seen and discussed with ICU staff, agree with above.    Neuro: 	Altered mental status  Pt weaned off propofol 30  Continue precedex 1  Continue oxycodone to prevent narcotic withdrawal  Continue Seroquel for agitation  Wean sedation as long as does not cause vent dyssynchrony    Pulm: Covid 19 PNA   350/ 26/5/30%  P/F 426  Pplat 15 f  ARDS improving overall  Failed SBT today    CV: 	S/p sepsis  Remains off pressors; home norvasc and hydralazine restarted.   Holding home labetalol because HR <70  On home ASA, statin    GI: 	tolerating tube feeds  Remains on Nepro  Continue Pepcid  Alk phos, AST, ALT elevated, will continue to monitor; bilirubin normal    :	oliguric stage 3 MARIO on CKD unknown baseline Cr (although pt's family says she has kidney problems)  Planning for HD today  Phosphate binder for hyperphosphatemia     ID: 	Plaquenil dosing per current guideline completed; dose of Actemra given 4/8  Procalcitonin and WBC rising  Cultures sent  Will start vanc and meropenem  Line change today  Completed solumedrol 3 day course for cytokine storm,   Anakinra to continue for full course given some improvement in inflammatory markers  Tracheal aspirate (sputum) for COVID pending    Heme: 	Continue HSQ 7500 q12 per new system guidelines for chemical VTE prophylaxis in COVID patients (CrCl < 20, BMI >30)    Endo: 	off insulin gtt, on lantus 6units QHS and SSI .

## 2020-04-13 NOTE — PROGRESS NOTE ADULT - SUBJECTIVE AND OBJECTIVE BOX
HISTORY  65y Female with COVID-19(+) pneumonia requiring intubation.    24 HOUR EVENTS:    SUBJECTIVE/ROS: Due to intubation with sedation, subjective information was not able to be obtained from the patient. History was obtained, to the extent possible, from review of the chart and collateral sources of information.      NEURO  Exam: sedated, no acute distress  Meds: acetaminophen    Suspension .. 975 milliGRAM(s) Enteral Tube every 6 hours PRN Temp greater or equal to 38C (100.4F)  dexMEDEtomidine Infusion 0.7 MICROgram(s)/kG/Hr IV Continuous <Continuous>  propofol Infusion 45 MICROgram(s)/kG/Min IV Continuous <Continuous>    Adequacy of sedation and pain control has been assessed and adjusted.      RESPIRATORY  RR: 27 (27 - 27)  SpO2: 97% (95% - 100%)  Exam: without dissynchrony  Mechanical Ventilation: Mode: AC/ CMV (Assist Control/ Continuous Mandatory Ventilation), RR (machine): 18, TV (machine): 440, FiO2: 30, PEEP: 5  Predicted Body Weight:  Tidal Volume/PBW ratio:  PaO2 to FiO2 ratio:  Extubation readiness assessed.  ABG - ( 13 Apr 2020 03:04 )  pH: 7.33  /  pCO2: 37    /  pO2: 128   / HCO3: 19    / Base Excess: -5.6  /  SaO2: 98      Lactate: x                CARDIOVASCULAR  HR: 63 (56 - 69)  ABP: 158/56 (106/42 - 158/56)  ABP(mean): 88 (60 - 88)  Cardiac Rhythm: sinus  Most recent QTc:   Meds: amLODIPine   Tablet  hydrALAZINE  hydrALAZINE Injectable PRN        GI/NUTRITION  Diet: Diet, NPO with Tube Feed:   Tube Feeding Modality: Orogastric  Nepro with Carb Steady (NEPRORTH)  Total Volume for 24 Hours (mL): 720  Bolus  Total Volume of Bolus (mL):  180  Tube Feed Frequency: Every 6 hours   Tube Feed Start Time: 12:00  Bolus Feed Rate (mL per Hour): 180   Bolus Feed Duration (in Hours): 1 (04-10-20 @ 14:30)    Most recent bowel movement:  Meds/stress ulcer prophylaxis: calcium acetate 1334 milliGRAM(s) Oral two times a day with meals  dextrose 5%. 1000 milliLiter(s) IV Continuous <Continuous>  famotidine Injectable 20 milliGRAM(s) IV Push daily  sodium bicarbonate 1300 milliGRAM(s) Oral three times a day        GENITOURINARY  I&O's Detail    04-11 @ 07:01 - 04-12 @ 07:00  --------------------------------------------------------  IN:    bumetanide Infusion: 160 mL    dexmedetomidine Infusion: 502.5 mL    Enteral Tube Flush: 60 mL    insulin regular Infusion: 36.5 mL    Nepro with Carb Steady: 540 mL    propofol Infusion: 552 mL    Solution: 50 mL  Total IN: 1901 mL    OUT:    Indwelling Catheter - Urethral: 1570 mL  Total OUT: 1570 mL    Total NET: 331 mL      04-12 @ 07:01 - 04-13 @ 06:57  --------------------------------------------------------  IN:    bumetanide Infusion: 70 mL    dexmedetomidine Infusion: 265 mL    dexmedetomidine Infusion: 150 mL    Enteral Tube Flush: 230 mL    Nepro with Carb Steady: 720 mL    propofol Infusion: 42 mL  Total IN: 1477 mL    OUT:    Indwelling Catheter - Urethral: 775 mL  Total OUT: 775 mL    Total NET: 702 mL          04-13    135  |  94<L>  |  114<H>  ----------------------------<  209<H>  3.7   |  17<L>  |  5.31<H>    Ca    8.0<L>      13 Apr 2020 03:12  Phos  9.0     04-13  Mg     2.3     04-13    TPro  5.2<L>  /  Alb  2.0<L>  /  TBili  0.4  /  DBili  0.3<H>  /  AST  175<H>  /  ALT  135<H>  /  AlkPhos  815<H>  04-13    [x] Glass catheter, indication: urine output monitoring in critically ill patient.  Meds: calcium acetate 1334 milliGRAM(s) Oral two times a day with meals  dextrose 5%. 1000 milliLiter(s) IV Continuous <Continuous>  sodium bicarbonate 1300 milliGRAM(s) Oral three times a day    Creatine Kinase, Serum: 123 (04-13-20 @ 03:12)        HEMATOLOGIC  Meds/VTE prophylaxis: aspirin  chewable 81 milliGRAM(s) Enteral Tube daily  heparin  Injectable 7500 Unit(s) SubCutaneous every 12 hours                          7.3    12.76 )-----------( 216      ( 13 Apr 2020 03:12 )             23.7     PT/INR - ( 13 Apr 2020 03:12 )   PT: 12.5 sec;   INR: 1.08 ratio         PTT - ( 13 Apr 2020 03:12 )  PTT:50.5 sec  [id number] @ 03:12        INFECTIOUS DISEASES  T(C): 36.3, Max: 36.8 (04-13-20 @ 00:00)  WBC Count: 12.76 (04-13-20 @ 03:12)  WBC Count: 9.55 (04-12-20 @ 01:11)    Recent Cultures:    Meds: vancomycin  IVPB 1000 milliGRAM(s) IV Intermittent once    Procalcitonin, Serum: 0.84 ng/mL (04-13-20 @ 03:12)        ENDOCRINE  Fingersticks: 185, 214, 194, 191, 162  Meds: atorvastatin 20 milliGRAM(s) Oral at bedtime  dextrose 40% Gel 15 Gram(s) Oral once PRN  dextrose 50% Injectable 12.5 Gram(s) IV Push once  dextrose 50% Injectable 25 Gram(s) IV Push once  dextrose 50% Injectable 25 Gram(s) IV Push once  glucagon  Injectable 1 milliGRAM(s) IntraMuscular once PRN  insulin glargine Injectable (LANTUS) 6 Unit(s) SubCutaneous at bedtime  insulin lispro (HumaLOG) corrective regimen sliding scale   SubCutaneous three times a day before meals  insulin lispro (HumaLOG) corrective regimen sliding scale   SubCutaneous at bedtime  levothyroxine Injectable 100 MICROGram(s) IV Push at bedtime        ACCESS DEVICES:  [ ] Peripheral IV  [x] Central Venous Line	[ ] R	[ ] L	[ ] IJ	[ ] Fem	[ ] SC	Placed:   [x] Arterial Line		[ ] R	[ ] L	[ ] Fem	[ ] Rad	[ ] Ax	Placed:   [ ] Shiley HD Access             [ ] R [ ] L [ ] IJ  [ ] Fem     Placed:  [x] Urinary Catheter, Date Placed:   Necessity of urinary, arterial, and venous catheters discussed.      CODE STATUS:     IMAGING:

## 2020-04-13 NOTE — PROGRESS NOTE ADULT - SUBJECTIVE AND OBJECTIVE BOX
[facility] DIVISION OF KIDNEY DISEASES AND HYPERTENSION -- PROGRESS NOTE    Chief complaint: MARIO    24 hour events/subjective:  remains intubated        PAST HISTORY  --------------------------------------------------------------------------------  No significant changes to PMH, PSH, FHx, SHx, unless otherwise noted    ALLERGIES & MEDICATIONS  --------------------------------------------------------------------------------  Allergies    No Known Allergies    Intolerances      Standing Inpatient Medications  amLODIPine   Tablet 10 milliGRAM(s) Oral daily  anakinra Injectable 100 milliGRAM(s) SubCutaneous two times a day  artificial  tears Solution 1 Drop(s) Both EYES two times a day  aspirin  chewable 81 milliGRAM(s) Enteral Tube daily  atorvastatin 20 milliGRAM(s) Oral at bedtime  calcium acetate 1334 milliGRAM(s) Oral two times a day with meals  chlorhexidine 0.12% Liquid 15 milliLiter(s) Oral Mucosa every 12 hours  dexMEDEtomidine Infusion 0.7 MICROgram(s)/kG/Hr IV Continuous <Continuous>  dextrose 5%. 1000 milliLiter(s) IV Continuous <Continuous>  dextrose 50% Injectable 12.5 Gram(s) IV Push once  dextrose 50% Injectable 25 Gram(s) IV Push once  dextrose 50% Injectable 25 Gram(s) IV Push once  famotidine Injectable 20 milliGRAM(s) IV Push daily  heparin  Injectable 7500 Unit(s) SubCutaneous every 12 hours  hydrALAZINE 50 milliGRAM(s) Oral every 8 hours  insulin glargine Injectable (LANTUS) 8 Unit(s) SubCutaneous at bedtime  insulin lispro (HumaLOG) corrective regimen sliding scale   SubCutaneous every 6 hours  levothyroxine Injectable 100 MICROGram(s) IV Push at bedtime  meropenem  IVPB 500 milliGRAM(s) IV Intermittent every 24 hours  polyethylene glycol 3350 17 Gram(s) Oral two times a day  senna Syrup 10 milliLiter(s) Oral at bedtime  sodium bicarbonate 1300 milliGRAM(s) Oral three times a day    PRN Inpatient Medications  acetaminophen    Suspension .. 975 milliGRAM(s) Enteral Tube every 6 hours PRN  dextrose 40% Gel 15 Gram(s) Oral once PRN  glucagon  Injectable 1 milliGRAM(s) IntraMuscular once PRN  hydrALAZINE Injectable 10 milliGRAM(s) IV Push every 6 hours PRN  HYDROmorphone  Injectable 0.5 milliGRAM(s) IV Push every 4 hours PRN      REVIEW OF SYSTEMS  --------------------------------------------------------------------------------  Constitutional: [ ] Fever [ ] Chills [ ] Fatigue [ ] Weight change   HEENT: [ ] Blurred vision [ ] Eye Pain [ ] Headache [ ] Runny nose [ ] Sore Throat   Respiratory: [ ] Cough [ ] Wheezing [ ] Shortness of breath  Cardiovascular: [ ] Chest Pain [ ] Palpitations [ ] LOPEZ [ ] PND [ ] Orthopnea  Gastrointestinal: [ ] Abdominal Pain [ ] Diarrhea [ ] Constipation [ ] Hemorrhoids [ ] Nausea [ ] Vomiting  Genitourinary: [ ] Nocturia [ ] Dysuria [ ] Incontinence  Extremities: [ ] Swelling [ ] Joint Pain  Neurologic: [ ] Focal deficit [ ] Paresthesias [ ] Syncope  Lymphatic: [ ] Swelling [ ] Lymphadenopathy   Skin: [ ] Rash [ ] Ecchymoses [ ] Wounds [ ] Lesions  Psychiatry: [ ] Depression [ ] Suicidal/Homicidal Ideation [ ] Anxiety [ ] Sleep Disturbances  [ ] 10 point review of systems is otherwise negative except as mentioned above              [x ]Unable to obtain due to patient intubated  All other systems were reviewed and are negative, except as noted.    VITALS/PHYSICAL EXAM  --------------------------------------------------------------------------------  T(C): 36.3 (04-13-20 @ 04:00), Max: 36.8 (04-13-20 @ 00:00)  HR: 65 (04-13-20 @ 12:14) (56 - 67)  BP: --  RR: 27 (04-13-20 @ 04:00) (27 - 27)  SpO2: 97% (04-13-20 @ 12:14) (96% - 100%)  Wt(kg): --        04-12-20 @ 07:01  -  04-13-20 @ 07:00  --------------------------------------------------------  IN: 1477 mL / OUT: 775 mL / NET: 702 mL    04-13-20 @ 07:01  -  04-13-20 @ 13:16  --------------------------------------------------------  IN: 0 mL / OUT: 125 mL / NET: -125 mL      LABS/STUDIES  --------------------------------------------------------------------------------              7.3    12.76 >-----------<  216      [04-13-20 @ 03:12]              23.7     135  |  94  |  114  ----------------------------<  209      [04-13-20 @ 03:12]  3.7   |  17  |  5.31        Ca     8.0     [04-13-20 @ 03:12]      Mg     2.3     [04-13-20 @ 03:12]      Phos  9.0     [04-13-20 @ 03:12]    TPro  5.2  /  Alb  2.0  /  TBili  0.4  /  DBili  0.3  /  AST  175  /  ALT  135  /  AlkPhos  815  [04-13-20 @ 03:12]    PT/INR: PT 12.5 , INR 1.08       [04-13-20 @ 03:12]  PTT: 50.5       [04-13-20 @ 03:12]          [04-13-20 @ 03:12]        [04-13-20 @ 03:12]    Creatinine Trend:  SCr 5.31 [04-13 @ 03:12]  SCr 5.02 [04-12 @ 12:20]  SCr 4.75 [04-12 @ 01:11]  SCr 4.84 [04-11 @ 11:57]  SCr 4.78 [04-11 @ 01:40]        Ferritin 1516      [04-13-20 @ 08:36]  Lipid: chol --, , HDL --, LDL --      [04-13-20 @ 03:12]    HBsAb <3.0      [04-10-20 @ 00:19]  HBsAg Nonreact      [04-10-20 @ 00:19]  HBcAb Nonreact      [04-10-20 @ 00:19]  HCV 0.14, Nonreact      [04-10-20 @ 00:19]

## 2020-04-14 LAB
ALBUMIN SERPL ELPH-MCNC: 2.1 G/DL — LOW (ref 3.3–5)
ALBUMIN SERPL ELPH-MCNC: 2.2 G/DL — LOW (ref 3.3–5)
ALP SERPL-CCNC: 1066 U/L — HIGH (ref 40–120)
ALP SERPL-CCNC: 856 U/L — HIGH (ref 40–120)
ALT FLD-CCNC: 159 U/L — HIGH (ref 10–45)
ALT FLD-CCNC: 181 U/L — HIGH (ref 10–45)
ANION GAP SERPL CALC-SCNC: 16 MMOL/L — SIGNIFICANT CHANGE UP (ref 5–17)
ANION GAP SERPL CALC-SCNC: 19 MMOL/L — HIGH (ref 5–17)
APPEARANCE UR: ABNORMAL
AST SERPL-CCNC: 163 U/L — HIGH (ref 10–40)
AST SERPL-CCNC: 209 U/L — HIGH (ref 10–40)
BASOPHILS # BLD AUTO: 0.01 K/UL — SIGNIFICANT CHANGE UP (ref 0–0.2)
BASOPHILS NFR BLD AUTO: 0.1 % — SIGNIFICANT CHANGE UP (ref 0–2)
BILIRUB SERPL-MCNC: 0.5 MG/DL — SIGNIFICANT CHANGE UP (ref 0.2–1.2)
BILIRUB SERPL-MCNC: 0.5 MG/DL — SIGNIFICANT CHANGE UP (ref 0.2–1.2)
BILIRUB UR-MCNC: NEGATIVE — SIGNIFICANT CHANGE UP
BUN SERPL-MCNC: 72 MG/DL — HIGH (ref 7–23)
BUN SERPL-MCNC: 82 MG/DL — HIGH (ref 7–23)
C3 SERPL-MCNC: 104 MG/DL — SIGNIFICANT CHANGE UP (ref 81–157)
C4 SERPL-MCNC: 15 MG/DL — SIGNIFICANT CHANGE UP (ref 13–39)
CALCIUM SERPL-MCNC: 8.4 MG/DL — SIGNIFICANT CHANGE UP (ref 8.4–10.5)
CALCIUM SERPL-MCNC: 8.4 MG/DL — SIGNIFICANT CHANGE UP (ref 8.4–10.5)
CHLORIDE SERPL-SCNC: 96 MMOL/L — SIGNIFICANT CHANGE UP (ref 96–108)
CHLORIDE SERPL-SCNC: 97 MMOL/L — SIGNIFICANT CHANGE UP (ref 96–108)
CO2 SERPL-SCNC: 21 MMOL/L — LOW (ref 22–31)
CO2 SERPL-SCNC: 24 MMOL/L — SIGNIFICANT CHANGE UP (ref 22–31)
COLOR SPEC: SIGNIFICANT CHANGE UP
CREAT ?TM UR-MCNC: 36 MG/DL — SIGNIFICANT CHANGE UP
CREAT SERPL-MCNC: 3.6 MG/DL — HIGH (ref 0.5–1.3)
CREAT SERPL-MCNC: 3.71 MG/DL — HIGH (ref 0.5–1.3)
CULTURE RESULTS: SIGNIFICANT CHANGE UP
DIFF PNL FLD: NEGATIVE — SIGNIFICANT CHANGE UP
EOSINOPHIL # BLD AUTO: 0.05 K/UL — SIGNIFICANT CHANGE UP (ref 0–0.5)
EOSINOPHIL NFR BLD AUTO: 0.4 % — SIGNIFICANT CHANGE UP (ref 0–6)
GAS PNL BLDA: SIGNIFICANT CHANGE UP
GLUCOSE BLDC GLUCOMTR-MCNC: 153 MG/DL — HIGH (ref 70–99)
GLUCOSE BLDC GLUCOMTR-MCNC: 155 MG/DL — HIGH (ref 70–99)
GLUCOSE BLDC GLUCOMTR-MCNC: 160 MG/DL — HIGH (ref 70–99)
GLUCOSE BLDC GLUCOMTR-MCNC: 175 MG/DL — HIGH (ref 70–99)
GLUCOSE BLDC GLUCOMTR-MCNC: 192 MG/DL — HIGH (ref 70–99)
GLUCOSE SERPL-MCNC: 151 MG/DL — HIGH (ref 70–99)
GLUCOSE SERPL-MCNC: 154 MG/DL — HIGH (ref 70–99)
GLUCOSE UR QL: ABNORMAL
HCT VFR BLD CALC: 22 % — LOW (ref 34.5–45)
HGB BLD-MCNC: 7.1 G/DL — LOW (ref 11.5–15.5)
IMM GRANULOCYTES NFR BLD AUTO: 1.1 % — SIGNIFICANT CHANGE UP (ref 0–1.5)
KETONES UR-MCNC: NEGATIVE — SIGNIFICANT CHANGE UP
LEUKOCYTE ESTERASE UR-ACNC: ABNORMAL
LYMPHOCYTES # BLD AUTO: 0.54 K/UL — LOW (ref 1–3.3)
LYMPHOCYTES # BLD AUTO: 3.9 % — LOW (ref 13–44)
MAGNESIUM SERPL-MCNC: 2.1 MG/DL — SIGNIFICANT CHANGE UP (ref 1.6–2.6)
MAGNESIUM SERPL-MCNC: 2.1 MG/DL — SIGNIFICANT CHANGE UP (ref 1.6–2.6)
MCHC RBC-ENTMCNC: 25.8 PG — LOW (ref 27–34)
MCHC RBC-ENTMCNC: 32.3 GM/DL — SIGNIFICANT CHANGE UP (ref 32–36)
MCV RBC AUTO: 80 FL — SIGNIFICANT CHANGE UP (ref 80–100)
MONOCYTES # BLD AUTO: 0.7 K/UL — SIGNIFICANT CHANGE UP (ref 0–0.9)
MONOCYTES NFR BLD AUTO: 5 % — SIGNIFICANT CHANGE UP (ref 2–14)
NEUTROPHILS # BLD AUTO: 12.5 K/UL — HIGH (ref 1.8–7.4)
NEUTROPHILS NFR BLD AUTO: 89.5 % — HIGH (ref 43–77)
NITRITE UR-MCNC: NEGATIVE — SIGNIFICANT CHANGE UP
NRBC # BLD: 0 /100 WBCS — SIGNIFICANT CHANGE UP (ref 0–0)
PH UR: 5.5 — SIGNIFICANT CHANGE UP (ref 5–8)
PHOSPHATE SERPL-MCNC: 5.2 MG/DL — HIGH (ref 2.5–4.5)
PHOSPHATE SERPL-MCNC: 5.3 MG/DL — HIGH (ref 2.5–4.5)
PLATELET # BLD AUTO: 199 K/UL — SIGNIFICANT CHANGE UP (ref 150–400)
POTASSIUM SERPL-MCNC: 3.4 MMOL/L — LOW (ref 3.5–5.3)
POTASSIUM SERPL-MCNC: 3.4 MMOL/L — LOW (ref 3.5–5.3)
POTASSIUM SERPL-SCNC: 3.4 MMOL/L — LOW (ref 3.5–5.3)
POTASSIUM SERPL-SCNC: 3.4 MMOL/L — LOW (ref 3.5–5.3)
PROCALCITONIN SERPL-MCNC: 0.77 NG/ML — HIGH (ref 0.02–0.1)
PROT ?TM UR-MCNC: 354 MG/DL — HIGH (ref 0–12)
PROT SERPL-MCNC: 5.6 G/DL — LOW (ref 6–8.3)
PROT SERPL-MCNC: 5.6 G/DL — LOW (ref 6–8.3)
PROT UR-MCNC: ABNORMAL
PROT/CREAT UR-RTO: 9.8 RATIO — HIGH (ref 0–0.2)
RBC # BLD: 2.75 M/UL — LOW (ref 3.8–5.2)
RBC # FLD: 14.2 % — SIGNIFICANT CHANGE UP (ref 10.3–14.5)
SODIUM SERPL-SCNC: 136 MMOL/L — SIGNIFICANT CHANGE UP (ref 135–145)
SODIUM SERPL-SCNC: 137 MMOL/L — SIGNIFICANT CHANGE UP (ref 135–145)
SP GR SPEC: 1.01 — SIGNIFICANT CHANGE UP (ref 1.01–1.02)
SPECIMEN SOURCE: SIGNIFICANT CHANGE UP
T3 SERPL-MCNC: 42 NG/DL — LOW (ref 80–200)
T4 AB SER-ACNC: 4.4 UG/DL — LOW (ref 4.6–12)
TSH SERPL-MCNC: 4.06 UIU/ML — SIGNIFICANT CHANGE UP (ref 0.27–4.2)
UROBILINOGEN FLD QL: NEGATIVE — SIGNIFICANT CHANGE UP
VANCOMYCIN FLD-MCNC: 7.5 UG/ML — SIGNIFICANT CHANGE UP
WBC # BLD: 13.95 K/UL — HIGH (ref 3.8–10.5)
WBC # FLD AUTO: 13.95 K/UL — HIGH (ref 3.8–10.5)

## 2020-04-14 PROCEDURE — 99291 CRITICAL CARE FIRST HOUR: CPT

## 2020-04-14 PROCEDURE — 99233 SBSQ HOSP IP/OBS HIGH 50: CPT

## 2020-04-14 RX ORDER — LACTULOSE 10 G/15ML
10 SOLUTION ORAL EVERY 6 HOURS
Refills: 0 | Status: DISCONTINUED | OUTPATIENT
Start: 2020-04-14 | End: 2020-04-15

## 2020-04-14 RX ORDER — FENTANYL CITRATE 50 UG/ML
50 INJECTION INTRAVENOUS ONCE
Refills: 0 | Status: DISCONTINUED | OUTPATIENT
Start: 2020-04-14 | End: 2020-04-15

## 2020-04-14 RX ORDER — CALCIUM ACETATE 667 MG
667 TABLET ORAL
Refills: 0 | Status: DISCONTINUED | OUTPATIENT
Start: 2020-04-14 | End: 2020-04-17

## 2020-04-14 RX ORDER — VANCOMYCIN HCL 1 G
500 VIAL (EA) INTRAVENOUS ONCE
Refills: 0 | Status: COMPLETED | OUTPATIENT
Start: 2020-04-14 | End: 2020-04-14

## 2020-04-14 RX ADMIN — CHLORHEXIDINE GLUCONATE 15 MILLILITER(S): 213 SOLUTION TOPICAL at 06:10

## 2020-04-14 RX ADMIN — Medication 1 DROP(S): at 16:52

## 2020-04-14 RX ADMIN — Medication 1300 MILLIGRAM(S): at 13:23

## 2020-04-14 RX ADMIN — ATORVASTATIN CALCIUM 20 MILLIGRAM(S): 80 TABLET, FILM COATED ORAL at 23:15

## 2020-04-14 RX ADMIN — AMLODIPINE BESYLATE 10 MILLIGRAM(S): 2.5 TABLET ORAL at 06:10

## 2020-04-14 RX ADMIN — POLYETHYLENE GLYCOL 3350 17 GRAM(S): 17 POWDER, FOR SOLUTION ORAL at 16:54

## 2020-04-14 RX ADMIN — Medication 100 MILLIGRAM(S): at 13:31

## 2020-04-14 RX ADMIN — Medication 10 MILLIGRAM(S): at 09:09

## 2020-04-14 RX ADMIN — POLYETHYLENE GLYCOL 3350 17 GRAM(S): 17 POWDER, FOR SOLUTION ORAL at 06:11

## 2020-04-14 RX ADMIN — Medication 50 MILLIGRAM(S): at 00:00

## 2020-04-14 RX ADMIN — LACTULOSE 10 GRAM(S): 10 SOLUTION ORAL at 13:22

## 2020-04-14 RX ADMIN — Medication 100 MICROGRAM(S): at 23:14

## 2020-04-14 RX ADMIN — SENNA PLUS 10 MILLILITER(S): 8.6 TABLET ORAL at 00:00

## 2020-04-14 RX ADMIN — Medication 1300 MILLIGRAM(S): at 00:00

## 2020-04-14 RX ADMIN — Medication 50 MILLIGRAM(S): at 13:23

## 2020-04-14 RX ADMIN — Medication 2: at 05:00

## 2020-04-14 RX ADMIN — Medication 1 DROP(S): at 06:10

## 2020-04-14 RX ADMIN — FAMOTIDINE 20 MILLIGRAM(S): 10 INJECTION INTRAVENOUS at 13:22

## 2020-04-14 RX ADMIN — CHLORHEXIDINE GLUCONATE 15 MILLILITER(S): 213 SOLUTION TOPICAL at 16:53

## 2020-04-14 RX ADMIN — Medication 1334 MILLIGRAM(S): at 00:00

## 2020-04-14 RX ADMIN — Medication 2: at 17:01

## 2020-04-14 RX ADMIN — MEROPENEM 100 MILLIGRAM(S): 1 INJECTION INTRAVENOUS at 10:28

## 2020-04-14 RX ADMIN — LACTULOSE 10 GRAM(S): 10 SOLUTION ORAL at 23:14

## 2020-04-14 RX ADMIN — Medication 81 MILLIGRAM(S): at 13:24

## 2020-04-14 RX ADMIN — Medication 100 MILLIGRAM(S): at 10:28

## 2020-04-14 RX ADMIN — Medication 2: at 00:40

## 2020-04-14 RX ADMIN — HEPARIN SODIUM 7500 UNIT(S): 5000 INJECTION INTRAVENOUS; SUBCUTANEOUS at 16:58

## 2020-04-14 RX ADMIN — Medication 50 MILLIGRAM(S): at 06:10

## 2020-04-14 RX ADMIN — INSULIN GLARGINE 8 UNIT(S): 100 INJECTION, SOLUTION SUBCUTANEOUS at 00:40

## 2020-04-14 RX ADMIN — LACTULOSE 10 GRAM(S): 10 SOLUTION ORAL at 16:54

## 2020-04-14 RX ADMIN — Medication 1300 MILLIGRAM(S): at 06:11

## 2020-04-14 RX ADMIN — ATORVASTATIN CALCIUM 20 MILLIGRAM(S): 80 TABLET, FILM COATED ORAL at 00:00

## 2020-04-14 RX ADMIN — SENNA PLUS 10 MILLILITER(S): 8.6 TABLET ORAL at 23:14

## 2020-04-14 RX ADMIN — Medication 2: at 23:30

## 2020-04-14 RX ADMIN — Medication 1300 MILLIGRAM(S): at 23:14

## 2020-04-14 RX ADMIN — Medication 2: at 14:16

## 2020-04-14 RX ADMIN — Medication 667 MILLIGRAM(S): at 16:54

## 2020-04-14 RX ADMIN — Medication 50 MILLIGRAM(S): at 23:15

## 2020-04-14 RX ADMIN — HEPARIN SODIUM 7500 UNIT(S): 5000 INJECTION INTRAVENOUS; SUBCUTANEOUS at 06:10

## 2020-04-14 RX ADMIN — Medication 10 MILLIGRAM(S): at 15:49

## 2020-04-14 RX ADMIN — INSULIN GLARGINE 8 UNIT(S): 100 INJECTION, SOLUTION SUBCUTANEOUS at 23:30

## 2020-04-14 RX ADMIN — Medication 100 MICROGRAM(S): at 00:00

## 2020-04-14 NOTE — PROGRESS NOTE ADULT - SUBJECTIVE AND OBJECTIVE BOX
[facility] DIVISION OF KIDNEY DISEASES AND HYPERTENSION -- PROGRESS NOTE    Chief complaint: MARIO    24 hour events/subjective:  received HD yesterday      PAST HISTORY  --------------------------------------------------------------------------------  No significant changes to PMH, PSH, FHx, SHx, unless otherwise noted    ALLERGIES & MEDICATIONS  --------------------------------------------------------------------------------  Allergies    No Known Allergies    Intolerances      Standing Inpatient Medications  amLODIPine   Tablet 10 milliGRAM(s) Oral daily  artificial  tears Solution 1 Drop(s) Both EYES two times a day  aspirin  chewable 81 milliGRAM(s) Enteral Tube daily  atorvastatin 20 milliGRAM(s) Oral at bedtime  calcium acetate 667 milliGRAM(s) Oral two times a day with meals  chlorhexidine 0.12% Liquid 15 milliLiter(s) Oral Mucosa every 12 hours  dexMEDEtomidine Infusion 0.7 MICROgram(s)/kG/Hr IV Continuous <Continuous>  dextrose 5%. 1000 milliLiter(s) IV Continuous <Continuous>  dextrose 50% Injectable 12.5 Gram(s) IV Push once  dextrose 50% Injectable 25 Gram(s) IV Push once  dextrose 50% Injectable 25 Gram(s) IV Push once  famotidine Injectable 20 milliGRAM(s) IV Push daily  fentaNYL    Injectable 50 MICROGram(s) IV Push once  heparin  Injectable 7500 Unit(s) SubCutaneous every 12 hours  hydrALAZINE 50 milliGRAM(s) Oral every 8 hours  insulin glargine Injectable (LANTUS) 8 Unit(s) SubCutaneous at bedtime  insulin lispro (HumaLOG) corrective regimen sliding scale   SubCutaneous every 6 hours  lactulose Syrup 10 Gram(s) Oral every 6 hours  levothyroxine Injectable 100 MICROGram(s) IV Push at bedtime  meropenem  IVPB 500 milliGRAM(s) IV Intermittent every 24 hours  polyethylene glycol 3350 17 Gram(s) Oral two times a day  senna Syrup 10 milliLiter(s) Oral at bedtime  sodium bicarbonate 1300 milliGRAM(s) Oral three times a day    PRN Inpatient Medications  acetaminophen    Suspension .. 975 milliGRAM(s) Enteral Tube every 6 hours PRN  dextrose 40% Gel 15 Gram(s) Oral once PRN  glucagon  Injectable 1 milliGRAM(s) IntraMuscular once PRN  hydrALAZINE Injectable 10 milliGRAM(s) IV Push every 6 hours PRN  HYDROmorphone  Injectable 0.5 milliGRAM(s) IV Push every 4 hours PRN      REVIEW OF SYSTEMS  --------------------------------------------------------------------------------  Constitutional: [ ] Fever [ ] Chills [ ] Fatigue [ ] Weight change   HEENT: [ ] Blurred vision [ ] Eye Pain [ ] Headache [ ] Runny nose [ ] Sore Throat   Respiratory: [ ] Cough [ ] Wheezing [ ] Shortness of breath  Cardiovascular: [ ] Chest Pain [ ] Palpitations [ ] LOPEZ [ ] PND [ ] Orthopnea  Gastrointestinal: [ ] Abdominal Pain [ ] Diarrhea [ ] Constipation [ ] Hemorrhoids [ ] Nausea [ ] Vomiting  Genitourinary: [ ] Nocturia [ ] Dysuria [ ] Incontinence  Extremities: [ ] Swelling [ ] Joint Pain  Neurologic: [ ] Focal deficit [ ] Paresthesias [ ] Syncope  Lymphatic: [ ] Swelling [ ] Lymphadenopathy   Skin: [ ] Rash [ ] Ecchymoses [ ] Wounds [ ] Lesions  Psychiatry: [ ] Depression [ ] Suicidal/Homicidal Ideation [ ] Anxiety [ ] Sleep Disturbances  [ ] 10 point review of systems is otherwise negative except as mentioned above              [x ]Unable to obtain due to patient intubated  All other systems were reviewed and are negative, except as noted.    VITALS/PHYSICAL EXAM  --------------------------------------------------------------------------------  T(C): 35.7 (04-14-20 @ 08:00), Max: 36.1 (04-14-20 @ 04:00)  HR: 79 (04-14-20 @ 10:08) (59 - 79)  BP: --  RR: 28 (04-14-20 @ 08:00) (27 - 28)  SpO2: 100% (04-14-20 @ 10:08) (98% - 100%)  Wt(kg): --        04-13-20 @ 07:01  -  04-14-20 @ 07:00  --------------------------------------------------------  IN: 1464 mL / OUT: 2375 mL / NET: -911 mL    04-14-20 @ 07:01  -  04-14-20 @ 12:24  --------------------------------------------------------  IN: 15.5 mL / OUT: 310 mL / NET: -294.5 mL      LABS/STUDIES  --------------------------------------------------------------------------------              7.1    13.95 >-----------<  199      [04-14-20 @ 01:54]              22.0     136  |  96  |  72  ----------------------------<  151      [04-14-20 @ 01:54]  3.4   |  24  |  3.60        Ca     8.4     [04-14-20 @ 01:54]      Mg     2.1     [04-14-20 @ 01:54]      Phos  5.3     [04-14-20 @ 01:54]    TPro  5.6  /  Alb  2.2  /  TBili  0.5  /  DBili  0.4  /  AST  163  /  ALT  159  /  AlkPhos  856  [04-14-20 @ 01:54]    PT/INR: PT 12.5 , INR 1.08       [04-13-20 @ 03:12]  PTT: 50.5       [04-13-20 @ 03:12]          [04-13-20 @ 03:12]        [04-13-20 @ 03:12]    Creatinine Trend:  SCr 3.60 [04-14 @ 01:54]  SCr 5.13 [04-13 @ 12:56]  SCr 5.31 [04-13 @ 03:12]  SCr 5.02 [04-12 @ 12:20]  SCr 4.75 [04-12 @ 01:11]    Urinalysis - [04-13-20 @ 13:58]      Color Yellow / Appearance Slightly Turbid / SG 1.014 / pH 5.5      Gluc 100 mg/dL / Ketone Negative  / Bili Negative / Urobili Negative       Blood Negative / Protein 100 mg/dL / Leuk Est Large / Nitrite Negative      RBC 2 / WBC 49 / Hyaline 10 / Gran  / Sq Epi  / Non Sq Epi 3 / Bacteria Negative      Ferritin 1516      [04-13-20 @ 08:36]  Lipid: chol --, , HDL --, LDL --      [04-13-20 @ 03:12]    HBsAb <3.0      [04-10-20 @ 00:19]  HBsAg Nonreact      [04-10-20 @ 00:19]  HBcAb Nonreact      [04-10-20 @ 00:19]  HCV 0.14, Nonreact      [04-10-20 @ 00:19] 195.1

## 2020-04-14 NOTE — PROGRESS NOTE ADULT - ASSESSMENT
65F PMHx DM2, HTN, HLD who present to ED for fever/dyspnea x 1 week.  Per HPI, pt desaturated at home O2 sat 70% which improved nasal cannula (another family member).  In ED hypoxia 70s on nasal cannula s/p intubation.  Covid positive with CXR b/l opacities. Given ceftriaxone, azithromycin, hydroxychlorquine.     Nephrology consulted for MARIO or MARIO on CKD.       MARIO    likely 2/2 ATN in setting septic shock Covid19 infection, low BP  on admission sCr 3.93 on 4/3/2020, has been uptrending since.  Exact duration of elevated Scr however unknown, no prior labs available for review  - s/p HD yesterday, no plan for HD today, will continue to reassess the need for RRT on daily basis  - UA with ?UTI, check spot urine TP/CR and C3, C4. Obtain renal U/S when feasible.  - Monitor labs and urine output. Avoid NSAIDs, ACEI/ARBS, RCA and nephrotoxins. Dose vancomycin per level.      Metabolic acidosis   in the setting of MARIO   improved on HD   on sodium bicarb 1300 PO TID, will consider holding PO bicarb if remains HD dependent    Hyperphosphatemia  in the setting of MARIO   continue Ca acetate  Monitor serum phosphorus    Hypocalcemia   in the setting of hyperphosphatemia and hypoalbuminemia  - continue calcium acetate  - monitor ionized calcium, replete to keep >1.1

## 2020-04-14 NOTE — PROGRESS NOTE ADULT - SUBJECTIVE AND OBJECTIVE BOX
HPI:  64 yo female withHTN, DM, HLD,   presenting with SOB for one week. per patient's , patient had one week of SOB, fevers. Was saturating 70% at home, where patient used family member's oxygen, bringing up to 80-90%. today was 60% on RA so came to ED for further evaluation.       Unable to obtain ROS (2020 14:17)    OVERNIGHT EVENTS: Patient had HD overnight.    Vital Signs Last 24 Hrs  T(C): 36 (2020 22:20), Max: 36.3 (2020 04:00)  T(F): 96.8 (2020 22:20), Max: 97.4 (2020 04:00)  HR: 59 (2020 22:20) (59 - 69)  BP: --  BP(mean): --  RR: 27 (2020 20:00) (27 - 27)  SpO2: 100% (2020 22:20) (97% - 100%)    I&O's Detail    2020 07:01  -  2020 07:00  --------------------------------------------------------  IN:    bumetanide Infusion: 70 mL    dexmedetomidine Infusion: 265 mL    dexmedetomidine Infusion: 150 mL    Enteral Tube Flush: 230 mL    Nepro with Carb Steady: 720 mL    propofol Infusion: 42 mL  Total IN: 1477 mL    OUT:    Indwelling Catheter - Urethral: 775 mL  Total OUT: 775 mL    Total NET: 702 mL      2020 07:01  -  2020 00:30  --------------------------------------------------------  IN:    Nepro with Carb Steady: 360 mL    Other: 800 mL  Total IN: 1160 mL    OUT:    Indwelling Catheter - Urethral: 455 mL    Other: 1800 mL  Total OUT: 2255 mL    Total NET: -1095 mL        I&O's Summary    2020 07:01  -  2020 07:00  --------------------------------------------------------  IN: 1477 mL / OUT: 775 mL / NET: 702 mL    2020 07:01  -  2020 00:30  --------------------------------------------------------  IN: 1160 mL / OUT: 2255 mL / NET: -1095 mL      TUBES/LINES:  [x] CVC  [x] A-line  [] Lumbar Drain  [] Ventriculostomy  [] Other    DIET:  [] NPO  [] Mechanical  [x] Tube feeds    LABS:                        7.3    12.76 )-----------( 216      ( 2020 03:12 )             23.7     04-13    132<L>  |  93<L>  |  113<H>  ----------------------------<  210<H>  3.5   |  17<L>  |  5.13<H>    Ca    8.3<L>      2020 12:56  Phos  8.3     04-13  Mg     2.3     04-13    TPro  5.2<L>  /  Alb  1.9<L>  /  TBili  0.3  /  DBili  x   /  AST  188<H>  /  ALT  150<H>  /  AlkPhos  843<H>  04-13    PT/INR - ( 2020 03:12 )   PT: 12.5 sec;   INR: 1.08 ratio         PTT - ( 2020 03:12 )  PTT:50.5 sec  Urinalysis Basic - ( 2020 13:58 )    Color: Yellow / Appearance: Slightly Turbid / S.014 / pH: x  Gluc: x / Ketone: Negative  / Bili: Negative / Urobili: Negative   Blood: x / Protein: 100 mg/dL / Nitrite: Negative   Leuk Esterase: Large / RBC: 2 /hpf / WBC 49 /HPF   Sq Epi: x / Non Sq Epi: 3 /hpf / Bacteria: Negative      CARDIAC MARKERS ( 2020 03:12 )  x     / x     / 123 U/L / x     / x          CAPILLARY BLOOD GLUCOSE      POCT Blood Glucose.: 162 mg/dL (2020 17:08)  POCT Blood Glucose.: 244 mg/dL (2020 10:53)  POCT Blood Glucose.: 185 mg/dL (2020 06:28)      Drug Levels: [] N/A    CSF Analysis: [] N/A      Allergies    No Known Allergies    Intolerances      MEDICATIONS:  Antibiotics:  meropenem  IVPB 500 milliGRAM(s) IV Intermittent every 24 hours    Neuro:  acetaminophen    Suspension .. 975 milliGRAM(s) Enteral Tube every 6 hours PRN  dexMEDEtomidine Infusion 0.7 MICROgram(s)/kG/Hr IV Continuous <Continuous>  HYDROmorphone  Injectable 0.5 milliGRAM(s) IV Push every 4 hours PRN    Anticoagulation:  aspirin  chewable 81 milliGRAM(s) Enteral Tube daily  heparin  Injectable 7500 Unit(s) SubCutaneous every 12 hours    OTHER:  amLODIPine   Tablet 10 milliGRAM(s) Oral daily  artificial  tears Solution 1 Drop(s) Both EYES two times a day  atorvastatin 20 milliGRAM(s) Oral at bedtime  chlorhexidine 0.12% Liquid 15 milliLiter(s) Oral Mucosa every 12 hours  dextrose 40% Gel 15 Gram(s) Oral once PRN  dextrose 50% Injectable 12.5 Gram(s) IV Push once  dextrose 50% Injectable 25 Gram(s) IV Push once  dextrose 50% Injectable 25 Gram(s) IV Push once  famotidine Injectable 20 milliGRAM(s) IV Push daily  glucagon  Injectable 1 milliGRAM(s) IntraMuscular once PRN  hydrALAZINE 50 milliGRAM(s) Oral every 8 hours  hydrALAZINE Injectable 10 milliGRAM(s) IV Push every 6 hours PRN  insulin glargine Injectable (LANTUS) 8 Unit(s) SubCutaneous at bedtime  insulin lispro (HumaLOG) corrective regimen sliding scale   SubCutaneous every 6 hours  levothyroxine Injectable 100 MICROGram(s) IV Push at bedtime  polyethylene glycol 3350 17 Gram(s) Oral two times a day  senna Syrup 10 milliLiter(s) Oral at bedtime    IVF:  calcium acetate 1334 milliGRAM(s) Oral two times a day with meals  dextrose 5%. 1000 milliLiter(s) IV Continuous <Continuous>  sodium bicarbonate 1300 milliGRAM(s) Oral three times a day    CULTURES:  Culture Results:   No Growth Final ( @ 17:12)  Culture Results:   No Growth Final ( @ 17:11)    RADIOLOGY & ADDITIONAL TESTS:  < from: Xray Chest 1 View- PORTABLE-Urgent (20 @ 15:28) >  Impression:    The heartis enlarged. Diffuse airspace opacities are seen throughout both lungs especially in both lower lobes which appears to be slightly worsened when compared to previous study done 2020. Endotracheal tube is in good position. Shiley catheter is seen in the right and the tip is in superior vena cava. No pneumothorax.    TIAGO AWAN M.D., ATTENDING RADIOLOGIST  This document has been electronically signed. 2020  3:41PM    < end of copied text >

## 2020-04-14 NOTE — PROGRESS NOTE ADULT - ASSESSMENT
Neuro   - precedex 0.5 mcg/ kg/ hr     CV  - MAP 88  - hydralazine 50mg [id number] and norvasc 10mg OGT with holding parameter  - hydralazine 10 mg IV q6 PRN MAP>100    Pulmonary  - AC/VC 26/350/30/5  - ABG 7.33/37/128/19  - pPlat 15  -TV/IBW = 6.12  - CPAP today    GI  - Nepro bolus feed 180ml Q 6 hours    - pepcid 20mg IV QD  - senna       - net positive 702 ml for 24 hours since bumex gtt d/c'ed  - HD 4/13 per renal  - phos 9.6, phoslo BID    Heme   - HSQ daily 7500u q12hrs     ID  - slight increase in procalcitonin (0.60 to 0.84 today) and increase WBC, concerns for possible line sepsis, BCx from central line sent, Vanco renal dosing daily by level and meropenum started today  - anakinra stopped due to line sepsis  - f/u UA  - will get peripheral IVs and d/c central line  - check inflammatory markers daily  - 4/8 solumedrol 50mg IV q 8hrs (until 4/11)   - 4/7 ACTEMRA  400mg IVPB x 1 given (d/w Dr Foote ID)   - completed plaquenil   - repeat COVID test sent yesterday, will f/u results    ENDO  - elevated BG, lantus increased from 6u QHS to 8u QHS  - continue HISS  - IV synthroid      Lines   - R IJ shiley (4/8), L  IJ  triple lumen (4/3-4/13),   L radial A-line Neuro   - will wean precedex 0.5 mcg/ kg/ hr off    CV  - hydralazine 50mg [id number] and norvasc 10mg OGT with holding parameter  - hydralazine 10 mg IV q6 PRN MAP>100    Pulmonary  - AC/VC 26/350/30/5  - ABG on CPAP 10/5: 7.4/40/92/24  - pPlat 26  -TV/IBW = 6.12  - continue CPAP today    GI  - Nepro bolus feed 180ml Q 6 hours    - pepcid 20mg IV QD  - Last BM on 4/10, lactulose added      - HD last night, will likely do HD tomorrow per renal  - net negative 847 ml for 24 hours since bumex gtt d/c'ed  - phos 5.3, phoslo decreased to 667mg BID    Heme   - HSQ daily 7500u q12hrs     ID  - vanco level 7.5 this am, got 1g Vanco, will re-check vanco level in am (if vanco level <15, can redose 1g x1)  - continue meropenum for empiric line sepsis  - f/u BCx and Urine culture  - check inflammatory markers daily  - s/p anakinra (4/10-4/13)  - 4/8 solumedrol 50mg IV q 8hrs (until 4/11)   - 4/7 ACTEMRA  400mg IVPB x 1 given (d/w Dr Foote ID)   - completed plaquenil   - repeat COVID test sent 4/13 - still positive    ENDO  - BG improved, will continue lantus 8u QHS  - continue HISS  - IV synthroid      Lines   - R IJ shiley (4/8), L  IJ  triple lumen (4/3-4/13),   L radial A-line Neuro   - will wean precedex 0.5 mcg/ kg/ hr off    CV  - hydralazine 50mg [id number] and norvasc 10mg OGT with holding parameter  - hydralazine 10 mg IV q6 PRN MAP>100    Pulmonary  - AC/VC 26/350/30/5  - ABG on CPAP 10/5: 7.4/40/92/24  - pPlat 26  -TV/IBW = 6.12  - continue CPAP today    GI  - Nepro bolus feed 180ml Q 6 hours    - pepcid 20mg IV QD  - Last BM on 4/10, lactulose added      - HD last night, will likely do HD tomorrow per renal  - net negative 847 ml for 24 hours since bumex gtt d/c'ed  - phos 5.3, phoslo decreased to 667mg BID    Heme   - HSQ daily 7500u q12hrs     ID  - vanco level 7.5 this am, got 1g Vanco, will re-check vanco level in am (if vanco level <15, can redose 1g x1)  - continue meropenum for empiric line sepsis  - f/u BCx and Urine culture  - check inflammatory markers daily  - s/p anakinra (4/10-4/13)  - 4/8 solumedrol 50mg IV q 8hrs (until 4/11)   - 4/7 ACTEMRA  400mg IVPB x 1 given (d/w Dr Foote ID)   - completed plaquenil   - repeat COVID test sent 4/13 - still positive    ENDO  - BG improved, will continue lantus 8u QHS  - continue HISS  - IV synthroid      Lines   - R IJ shiley (4/8), L  IJ  triple lumen (4/3-4/13),   L radial A-line    spoke with son in law, Eliseo, via cellphone regarding updates on patient status and plans.

## 2020-04-14 NOTE — PROGRESS NOTE ADULT - ATTENDING COMMENTS
Hypoxic resp failure, vent dependent  on minimal vent settings peep 5, fi02 30  unclear mental status, with goal to shut off precedex  sbt today  elevated lfts, with slight rise, though have been high  stable anemia  hd per renal   no longer hypotensive, now hypertensive on hydral/norvasc  positive ua on meropenem

## 2020-04-15 LAB
4/8 RATIO: 2.54 RATIO — SIGNIFICANT CHANGE UP (ref 0.9–3.6)
4/8 RATIO: 2.71 RATIO — SIGNIFICANT CHANGE UP (ref 0.9–3.6)
ABS CD8: 169 /UL — SIGNIFICANT CHANGE UP (ref 142–740)
ABS CD8: 198 /UL — SIGNIFICANT CHANGE UP (ref 142–740)
ALBUMIN SERPL ELPH-MCNC: 2.3 G/DL — LOW (ref 3.3–5)
ALBUMIN SERPL ELPH-MCNC: 2.4 G/DL — LOW (ref 3.3–5)
ALP SERPL-CCNC: 1134 U/L — HIGH (ref 40–120)
ALP SERPL-CCNC: 1184 U/L — HIGH (ref 40–120)
ALT FLD-CCNC: 214 U/L — HIGH (ref 10–45)
ALT FLD-CCNC: 226 U/L — HIGH (ref 10–45)
ANION GAP SERPL CALC-SCNC: 17 MMOL/L — SIGNIFICANT CHANGE UP (ref 5–17)
ANION GAP SERPL CALC-SCNC: 19 MMOL/L — HIGH (ref 5–17)
APTT BLD: 156.6 SEC — CRITICAL HIGH (ref 27.5–36.3)
APTT BLD: 194.5 SEC — CRITICAL HIGH (ref 27.5–36.3)
APTT BLD: 56.5 SEC — HIGH (ref 27.5–36.3)
AST SERPL-CCNC: 246 U/L — HIGH (ref 10–40)
AST SERPL-CCNC: 263 U/L — HIGH (ref 10–40)
BASOPHILS # BLD AUTO: 0.03 K/UL — SIGNIFICANT CHANGE UP (ref 0–0.2)
BASOPHILS # BLD AUTO: 0.04 K/UL — SIGNIFICANT CHANGE UP (ref 0–0.2)
BASOPHILS NFR BLD AUTO: 0.2 % — SIGNIFICANT CHANGE UP (ref 0–2)
BASOPHILS NFR BLD AUTO: 0.3 % — SIGNIFICANT CHANGE UP (ref 0–2)
BILIRUB SERPL-MCNC: 0.4 MG/DL — SIGNIFICANT CHANGE UP (ref 0.2–1.2)
BILIRUB SERPL-MCNC: 0.5 MG/DL — SIGNIFICANT CHANGE UP (ref 0.2–1.2)
BUN SERPL-MCNC: 86 MG/DL — HIGH (ref 7–23)
BUN SERPL-MCNC: 88 MG/DL — HIGH (ref 7–23)
CALCIUM SERPL-MCNC: 8.4 MG/DL — SIGNIFICANT CHANGE UP (ref 8.4–10.5)
CALCIUM SERPL-MCNC: 9.1 MG/DL — SIGNIFICANT CHANGE UP (ref 8.4–10.5)
CD3 BLASTS SPEC-ACNC: 631 /UL — LOW (ref 672–1870)
CD3 BLASTS SPEC-ACNC: 710 /UL — SIGNIFICANT CHANGE UP (ref 672–1870)
CD3 BLASTS SPEC-ACNC: 74 % — SIGNIFICANT CHANGE UP (ref 59–83)
CD3 BLASTS SPEC-ACNC: 76 % — SIGNIFICANT CHANGE UP (ref 59–83)
CD4 %: 53 % — SIGNIFICANT CHANGE UP (ref 30–62)
CD4 %: 54 % — SIGNIFICANT CHANGE UP (ref 30–62)
CD8 %: 20 % — SIGNIFICANT CHANGE UP (ref 12–36)
CD8 %: 21 % — SIGNIFICANT CHANGE UP (ref 12–36)
CHLORIDE SERPL-SCNC: 97 MMOL/L — SIGNIFICANT CHANGE UP (ref 96–108)
CHLORIDE SERPL-SCNC: 98 MMOL/L — SIGNIFICANT CHANGE UP (ref 96–108)
CK SERPL-CCNC: 27 U/L — SIGNIFICANT CHANGE UP (ref 25–170)
CK SERPL-CCNC: 32 U/L — SIGNIFICANT CHANGE UP (ref 25–170)
CO2 SERPL-SCNC: 23 MMOL/L — SIGNIFICANT CHANGE UP (ref 22–31)
CO2 SERPL-SCNC: 24 MMOL/L — SIGNIFICANT CHANGE UP (ref 22–31)
CREAT SERPL-MCNC: 3.96 MG/DL — HIGH (ref 0.5–1.3)
CREAT SERPL-MCNC: 4.19 MG/DL — HIGH (ref 0.5–1.3)
D DIMER BLD IA.RAPID-MCNC: 1058 NG/ML DDU — HIGH
D DIMER BLD IA.RAPID-MCNC: 1064 NG/ML DDU — HIGH
EOSINOPHIL # BLD AUTO: 0.12 K/UL — SIGNIFICANT CHANGE UP (ref 0–0.5)
EOSINOPHIL # BLD AUTO: 0.17 K/UL — SIGNIFICANT CHANGE UP (ref 0–0.5)
EOSINOPHIL NFR BLD AUTO: 1 % — SIGNIFICANT CHANGE UP (ref 0–6)
EOSINOPHIL NFR BLD AUTO: 1.2 % — SIGNIFICANT CHANGE UP (ref 0–6)
GAS PNL BLDA: SIGNIFICANT CHANGE UP
GAS PNL BLDA: SIGNIFICANT CHANGE UP
GGT SERPL-CCNC: 1272 U/L — HIGH (ref 8–40)
GLUCOSE BLDC GLUCOMTR-MCNC: 197 MG/DL — HIGH (ref 70–99)
GLUCOSE BLDC GLUCOMTR-MCNC: 209 MG/DL — HIGH (ref 70–99)
GLUCOSE BLDC GLUCOMTR-MCNC: 218 MG/DL — HIGH (ref 70–99)
GLUCOSE BLDC GLUCOMTR-MCNC: 219 MG/DL — HIGH (ref 70–99)
GLUCOSE SERPL-MCNC: 174 MG/DL — HIGH (ref 70–99)
GLUCOSE SERPL-MCNC: 191 MG/DL — HIGH (ref 70–99)
HCT VFR BLD CALC: 23 % — LOW (ref 34.5–45)
HCT VFR BLD CALC: 23.4 % — LOW (ref 34.5–45)
HGB BLD-MCNC: 7.1 G/DL — LOW (ref 11.5–15.5)
HGB BLD-MCNC: 7.2 G/DL — LOW (ref 11.5–15.5)
IMM GRANULOCYTES NFR BLD AUTO: 1.1 % — SIGNIFICANT CHANGE UP (ref 0–1.5)
IMM GRANULOCYTES NFR BLD AUTO: 1.4 % — SIGNIFICANT CHANGE UP (ref 0–1.5)
INR BLD: 1.03 RATIO — SIGNIFICANT CHANGE UP (ref 0.88–1.16)
INR BLD: 1.14 RATIO — SIGNIFICANT CHANGE UP (ref 0.88–1.16)
LDH SERPL L TO P-CCNC: 314 U/L — HIGH (ref 50–242)
LDH SERPL L TO P-CCNC: 329 U/L — HIGH (ref 50–242)
LYMPHOCYTES # BLD AUTO: 0.8 K/UL — LOW (ref 1–3.3)
LYMPHOCYTES # BLD AUTO: 0.97 K/UL — LOW (ref 1–3.3)
LYMPHOCYTES # BLD AUTO: 6.4 % — LOW (ref 13–44)
LYMPHOCYTES # BLD AUTO: 7.1 % — LOW (ref 13–44)
MAGNESIUM SERPL-MCNC: 2.3 MG/DL — SIGNIFICANT CHANGE UP (ref 1.6–2.6)
MAGNESIUM SERPL-MCNC: 2.4 MG/DL — SIGNIFICANT CHANGE UP (ref 1.6–2.6)
MCHC RBC-ENTMCNC: 25.4 PG — LOW (ref 27–34)
MCHC RBC-ENTMCNC: 25.6 PG — LOW (ref 27–34)
MCHC RBC-ENTMCNC: 30.8 GM/DL — LOW (ref 32–36)
MCHC RBC-ENTMCNC: 30.9 GM/DL — LOW (ref 32–36)
MCV RBC AUTO: 82.7 FL — SIGNIFICANT CHANGE UP (ref 80–100)
MCV RBC AUTO: 83 FL — SIGNIFICANT CHANGE UP (ref 80–100)
MONOCYTES # BLD AUTO: 1.05 K/UL — HIGH (ref 0–0.9)
MONOCYTES # BLD AUTO: 1.28 K/UL — HIGH (ref 0–0.9)
MONOCYTES NFR BLD AUTO: 8.4 % — SIGNIFICANT CHANGE UP (ref 2–14)
MONOCYTES NFR BLD AUTO: 9.3 % — SIGNIFICANT CHANGE UP (ref 2–14)
NEUTROPHILS # BLD AUTO: 10.33 K/UL — HIGH (ref 1.8–7.4)
NEUTROPHILS # BLD AUTO: 11.09 K/UL — HIGH (ref 1.8–7.4)
NEUTROPHILS NFR BLD AUTO: 80.7 % — HIGH (ref 43–77)
NEUTROPHILS NFR BLD AUTO: 82.9 % — HIGH (ref 43–77)
NRBC # BLD: 0 /100 WBCS — SIGNIFICANT CHANGE UP (ref 0–0)
NRBC # BLD: 0 /100 WBCS — SIGNIFICANT CHANGE UP (ref 0–0)
PHOSPHATE SERPL-MCNC: 5.5 MG/DL — HIGH (ref 2.5–4.5)
PHOSPHATE SERPL-MCNC: 6 MG/DL — HIGH (ref 2.5–4.5)
PLATELET # BLD AUTO: 261 K/UL — SIGNIFICANT CHANGE UP (ref 150–400)
PLATELET # BLD AUTO: 282 K/UL — SIGNIFICANT CHANGE UP (ref 150–400)
POTASSIUM SERPL-MCNC: 3.3 MMOL/L — LOW (ref 3.5–5.3)
POTASSIUM SERPL-MCNC: 3.3 MMOL/L — LOW (ref 3.5–5.3)
POTASSIUM SERPL-SCNC: 3.3 MMOL/L — LOW (ref 3.5–5.3)
POTASSIUM SERPL-SCNC: 3.3 MMOL/L — LOW (ref 3.5–5.3)
PROCALCITONIN SERPL-MCNC: 0.65 NG/ML — HIGH (ref 0.02–0.1)
PROCALCITONIN SERPL-MCNC: 0.66 NG/ML — HIGH (ref 0.02–0.1)
PROT SERPL-MCNC: 5.6 G/DL — LOW (ref 6–8.3)
PROT SERPL-MCNC: 5.6 G/DL — LOW (ref 6–8.3)
PROTHROM AB SERPL-ACNC: 11.9 SEC — SIGNIFICANT CHANGE UP (ref 10–12.9)
PROTHROM AB SERPL-ACNC: 13.1 SEC — HIGH (ref 10–12.9)
RBC # BLD: 2.77 M/UL — LOW (ref 3.8–5.2)
RBC # BLD: 2.83 M/UL — LOW (ref 3.8–5.2)
RBC # FLD: 14.2 % — SIGNIFICANT CHANGE UP (ref 10.3–14.5)
RBC # FLD: 14.6 % — HIGH (ref 10.3–14.5)
SODIUM SERPL-SCNC: 139 MMOL/L — SIGNIFICANT CHANGE UP (ref 135–145)
SODIUM SERPL-SCNC: 139 MMOL/L — SIGNIFICANT CHANGE UP (ref 135–145)
T-CELL CD4 SUBSET PNL BLD: 459 /UL — LOW (ref 489–1457)
T-CELL CD4 SUBSET PNL BLD: 501 /UL — SIGNIFICANT CHANGE UP (ref 489–1457)
TRIGL SERPL-MCNC: 121 MG/DL — SIGNIFICANT CHANGE UP (ref 10–149)
TRIGL SERPL-MCNC: 82 MG/DL — SIGNIFICANT CHANGE UP (ref 10–149)
TROPONIN T, HIGH SENSITIVITY RESULT: 80 NG/L — HIGH (ref 0–51)
TROPONIN T, HIGH SENSITIVITY RESULT: 87 NG/L — HIGH (ref 0–51)
WBC # BLD: 12.47 K/UL — HIGH (ref 3.8–10.5)
WBC # BLD: 13.74 K/UL — HIGH (ref 3.8–10.5)
WBC # FLD AUTO: 12.47 K/UL — HIGH (ref 3.8–10.5)
WBC # FLD AUTO: 13.74 K/UL — HIGH (ref 3.8–10.5)

## 2020-04-15 PROCEDURE — 99291 CRITICAL CARE FIRST HOUR: CPT

## 2020-04-15 PROCEDURE — 99233 SBSQ HOSP IP/OBS HIGH 50: CPT

## 2020-04-15 RX ORDER — METOPROLOL TARTRATE 50 MG
25 TABLET ORAL
Refills: 0 | Status: DISCONTINUED | OUTPATIENT
Start: 2020-04-15 | End: 2020-04-16

## 2020-04-15 RX ORDER — HEPARIN SODIUM 5000 [USP'U]/ML
1200 INJECTION INTRAVENOUS; SUBCUTANEOUS
Qty: 25000 | Refills: 0 | Status: DISCONTINUED | OUTPATIENT
Start: 2020-04-15 | End: 2020-04-16

## 2020-04-15 RX ORDER — HYDRALAZINE HCL 50 MG
50 TABLET ORAL EVERY 6 HOURS
Refills: 0 | Status: DISCONTINUED | OUTPATIENT
Start: 2020-04-15 | End: 2020-04-18

## 2020-04-15 RX ORDER — POTASSIUM CHLORIDE 20 MEQ
40 PACKET (EA) ORAL ONCE
Refills: 0 | Status: COMPLETED | OUTPATIENT
Start: 2020-04-15 | End: 2020-04-15

## 2020-04-15 RX ADMIN — Medication 667 MILLIGRAM(S): at 16:08

## 2020-04-15 RX ADMIN — Medication 1300 MILLIGRAM(S): at 04:06

## 2020-04-15 RX ADMIN — HEPARIN SODIUM 12 UNIT(S)/HR: 5000 INJECTION INTRAVENOUS; SUBCUTANEOUS at 12:36

## 2020-04-15 RX ADMIN — SENNA PLUS 10 MILLILITER(S): 8.6 TABLET ORAL at 22:46

## 2020-04-15 RX ADMIN — Medication 4: at 16:51

## 2020-04-15 RX ADMIN — CHLORHEXIDINE GLUCONATE 15 MILLILITER(S): 213 SOLUTION TOPICAL at 04:05

## 2020-04-15 RX ADMIN — Medication 40 MILLIEQUIVALENT(S): at 16:07

## 2020-04-15 RX ADMIN — Medication 10 MILLIGRAM(S): at 02:50

## 2020-04-15 RX ADMIN — AMLODIPINE BESYLATE 10 MILLIGRAM(S): 2.5 TABLET ORAL at 04:06

## 2020-04-15 RX ADMIN — Medication 1300 MILLIGRAM(S): at 22:46

## 2020-04-15 RX ADMIN — CHLORHEXIDINE GLUCONATE 15 MILLILITER(S): 213 SOLUTION TOPICAL at 16:08

## 2020-04-15 RX ADMIN — HEPARIN SODIUM 7500 UNIT(S): 5000 INJECTION INTRAVENOUS; SUBCUTANEOUS at 04:05

## 2020-04-15 RX ADMIN — Medication 50 MILLIGRAM(S): at 04:05

## 2020-04-15 RX ADMIN — Medication 1 DROP(S): at 16:09

## 2020-04-15 RX ADMIN — FAMOTIDINE 20 MILLIGRAM(S): 10 INJECTION INTRAVENOUS at 11:50

## 2020-04-15 RX ADMIN — Medication 50 MILLIGRAM(S): at 12:36

## 2020-04-15 RX ADMIN — Medication 667 MILLIGRAM(S): at 06:25

## 2020-04-15 RX ADMIN — Medication 1300 MILLIGRAM(S): at 12:35

## 2020-04-15 RX ADMIN — Medication 100 MICROGRAM(S): at 22:45

## 2020-04-15 RX ADMIN — Medication 2: at 22:45

## 2020-04-15 RX ADMIN — HEPARIN SODIUM 12 UNIT(S)/HR: 5000 INJECTION INTRAVENOUS; SUBCUTANEOUS at 16:51

## 2020-04-15 RX ADMIN — HYDROMORPHONE HYDROCHLORIDE 0.5 MILLIGRAM(S): 2 INJECTION INTRAMUSCULAR; INTRAVENOUS; SUBCUTANEOUS at 06:53

## 2020-04-15 RX ADMIN — Medication 4: at 04:31

## 2020-04-15 RX ADMIN — Medication 10 MILLIGRAM(S): at 15:00

## 2020-04-15 RX ADMIN — Medication 1 DROP(S): at 04:05

## 2020-04-15 RX ADMIN — MEROPENEM 100 MILLIGRAM(S): 1 INJECTION INTRAVENOUS at 11:51

## 2020-04-15 RX ADMIN — Medication 50 MILLIGRAM(S): at 22:44

## 2020-04-15 RX ADMIN — INSULIN GLARGINE 8 UNIT(S): 100 INJECTION, SOLUTION SUBCUTANEOUS at 22:45

## 2020-04-15 RX ADMIN — Medication 81 MILLIGRAM(S): at 11:49

## 2020-04-15 RX ADMIN — Medication 25 MILLIGRAM(S): at 16:09

## 2020-04-15 RX ADMIN — Medication 50 MILLIGRAM(S): at 16:08

## 2020-04-15 RX ADMIN — Medication 4: at 10:45

## 2020-04-15 NOTE — PROGRESS NOTE ADULT - ASSESSMENT
Assessment and Plan:   · Assessment		  Neuro   - will wean precedex 0.5 mcg/ kg/ hr off    CV  - hydralazine 50mg [id number] and norvasc 10mg OGT with holding parameter  - hydralazine 10 mg IV q6 PRN MAP>100    Pulmonary  - AC/VC 26/350/30/5  - ABG on CPAP 10/5: 7.4/40/92/24  - continue CPAP today    GI  - Nepro bolus feed 180ml Q 6 hours    - pepcid 20mg IV QD  - Last BM on 4/10, lactulose added      - HD last night, will likely do HD tomorrow per renal  - net negative 847 ml for 24 hours since bumex gtt d/c'ed  - phos 5.3, phoslo decreased to 667mg BID    Heme   - HSQ daily 7500u q12hrs     ID  - vanco level 7.5 this am, got 1g Vanco, will re-check vanco level in am (if vanco level <15, can redose 1g x1)  - continue meropenum for empiric line sepsis  - f/u BCx and Urine culture  - check inflammatory markers daily  - s/p anakinra (4/10-4/13)  - 4/8 solumedrol 50mg IV q 8hrs (until 4/11)   - 4/7 ACTEMRA  400mg IVPB x 1 given (d/w Dr Foote ID)   - completed plaquenil   - repeat COVID test sent 4/13 - still positive    ENDO  - BG improved, will continue lantus 8u QHS  - continue HISS  - IV synthroid      Lines   - R IJ shiley (4/8), L  IJ  triple lumen (4/3-4/13),   L radial A-line Assessment and Plan:   · Assessment		  Neuro   - precedex weaned off    CV  - increased hydralazine 50mg [id number] and continue norvasc 10mg OGT with holding parameter  - hydralazine 10 mg IV q6 PRN MAP>100  -hypokalemia- supplemented    Pulmonary  - AC/VC 26/350/30/5  - ABG on CPAP 10/5: 7.4/40/92/24  - tolerated CPAP today    GI  - Nepro bolus feed 180ml Q 6 hours    - pepcid 20mg IV QD  - Last BM on 4/10, lactulose added      - HD last night, will likely do HD tomorrow per renal  - net negative 847 ml for 24 hours since bumex gtt d/c'ed  - phos 5.3, phoslo decreased to 667mg BID    Heme   - full AC- heparin gtt initiated PTT goal 50-70    ID  - vanco level 7.5 this am, got 1g Vanco, will re-check vanco level in am (if vanco level <15, can redose 1g x1)  - continue meropenum for empiric line sepsis  - f/u BCx (neg to date) and Urine culture (normal beka)  - check inflammatory markers daily  - s/p anakinra (4/10-4/13)  - 4/8 solumedrol 50mg IV q 8hrs (until 4/11)   - 4/7 ACTEMRA  400mg IVPB x 1 given (d/w Dr Foote ID)   - completed plaquenil   - repeat COVID test sent 4/13 - still positive    ENDO  - BG improved, will continue lantus 8u QHS  - continue HISS  - IV synthroid      Lines   - R IJ shiley (4/8), L  IJ  triple lumen (4/3-4/13),   L radial A-line Assessment and Plan:   · Assessment		  Neuro   - precedex weaned off    CV  - increased hydralazine 50mg [id number] and continue norvasc 10mg OGT with holding parameter  - hydralazine 10 mg IV q6 PRN MAP>100  -hypokalemia- supplemented    Pulmonary  - AC/VC 26/350/30/5  - ABG on CPAP 10/5: 7.4/40/92/24  - tolerated CPAP today    GI  - Nepro bolus feed 180ml Q 6 hours    - pepcid 20mg IV QD  - Last BM on 4/10, lactulose added      - HD 2 days ago, no indication for HD today per renal, continue to monitor  - Cr stable @ 3.96  - net negative 847 ml for 24 hours since bumex gtt d/c'ed  - phos 5.3, phoslo decreased to 667mg BID    Heme   - full AC- heparin gtt initiated PTT goal 50-70    ID  - vanco level 7.5 this am, got 1g Vanco, will re-check vanco level in am (if vanco level <15, can redose 1g x1)  - continue meropenum for empiric line sepsis  - f/u BCx (neg to date) and Urine culture (normal beka)  - check inflammatory markers daily  - s/p anakinra (4/10-4/13)  - 4/8 solumedrol 50mg IV q 8hrs (until 4/11)   - 4/7 ACTEMRA  400mg IVPB x 1 given (d/w Dr Foote ID)   - completed plaquenil   - repeat COVID test sent 4/13 - still positive    ENDO  - BG improved, will continue lantus 8u QHS  - continue HISS  - IV synthroid      Lines   - R IJ shiley (4/8), L  IJ  triple lumen (4/3-4/13),   L radial A-line Assessment and Plan:   · Assessment		  Neuro   - precedex weaned off    CV  - increased hydralazine 50mg [id number] and continue norvasc 10mg OGT with holding parameter  - hydralazine 10 mg IV q6 PRN MAP>100  - hypokalemia- supplemented    Pulmonary  - AC/VC 26/350/30/5  - ABG on CPAP 10/5: 7.4/40/92/24  - tolerated CPAP today    GI  - Nepro bolus feed 180ml Q 6 hours    - pepcid 20mg IV QD  - Last BM on 4/10, lactulose added  - Alk phos is elevated. check GGT. AST/ALT stable.       - HD 2 days ago, no indication for HD today per renal, continue to monitor  - Cr stable @ 3.96  - net negative 847 ml for 24 hours since bumex gtt d/c'ed  - phos 5.3, phoslo decreased to 667mg BID    Heme   - full AC- heparin gtt initiated PTT goal 50-70    ID  - vanco level 7.5 this am, got 1g Vanco, will re-check vanco level in am (if vanco level <15, can redose 1g x1)  - continue meropenum for PNA  - f/u BCx (neg to date) and Urine culture (normal beka)  - check inflammatory markers daily  - s/p anakinra (4/10-4/13)  - 4/8 solumedrol 50mg IV q 8hrs (until 4/11)   - 4/7 ACTEMRA  400mg IVPB x 1 given (d/w Dr Foote ID)   - completed plaquenil   - repeat COVID test sent 4/13 - still positive    ENDO  - BG improved, will continue lantus 8u QHS  - continue HISS  - IV synthroid      Lines   - R IJ shiley (4/8), L  IJ  triple lumen (4/3-4/13),   L radial A-line

## 2020-04-15 NOTE — PROGRESS NOTE ADULT - SUBJECTIVE AND OBJECTIVE BOX
HISTORY  65y Female with COVID-19(+) pneumonia requiring intubation.    24 HOUR EVENTS:    SUBJECTIVE/ROS: Due to intubation with sedation, subjective information was not able to be obtained from the patient. History was obtained, to the extent possible, from review of the chart and collateral sources of information.      NEURO  Exam: sedated, no acute distress  Meds: acetaminophen    Suspension .. 975 milliGRAM(s) Enteral Tube every 6 hours PRN Temp greater or equal to 38C (100.4F)  fentaNYL    Injectable 50 MICROGram(s) IV Push once  HYDROmorphone  Injectable 0.5 milliGRAM(s) IV Push every 4 hours PRN moderate to severe pain    Adequacy of sedation and pain control has been assessed and adjusted.      RESPIRATORY  RR: 28 (28 - 28)  SpO2: 98% (98% - 100%)  Exam: without dissynchrony  Mechanical Ventilation: Mode: CPAP with PS, FiO2: 30, PEEP: 5, PS: 10  Predicted Body Weight:  Tidal Volume/PBW ratio:  PaO2 to FiO2 ratio:  Extubation readiness assessed.  ABG - ( 14 Apr 2020 02:17 )  pH: 7.40  /  pCO2: 40    /  pO2: 92    / HCO3: 24    / Base Excess: .2    /  SaO2: 98      Lactate: x                CARDIOVASCULAR  HR: 97 (64 - 99)  ABP: 157/49 (157/49 - 179/61)  ABP(mean): 80 (80 - 100)  Cardiac Rhythm: sinus  Most recent QTc:   Meds: amLODIPine   Tablet  hydrALAZINE  hydrALAZINE Injectable PRN        GI/NUTRITION  Diet:   Most recent bowel movement:  Meds/stress ulcer prophylaxis: calcium acetate 667 milliGRAM(s) Oral two times a day with meals  dextrose 5%. 1000 milliLiter(s) IV Continuous <Continuous>  famotidine Injectable 20 milliGRAM(s) IV Push daily  lactulose Syrup 10 Gram(s) Oral every 6 hours  polyethylene glycol 3350 17 Gram(s) Oral two times a day  senna Syrup 10 milliLiter(s) Oral at bedtime  sodium bicarbonate 1300 milliGRAM(s) Oral three times a day        GENITOURINARY  I&O's Detail    04-13 @ 07:01  -  04-14 @ 07:00  --------------------------------------------------------  IN:    dexmedetomidine Infusion: 124 mL    Nepro with Carb Steady: 540 mL    Other: 800 mL  Total IN: 1464 mL    OUT:    Indwelling Catheter - Urethral: 575 mL    Other: 1800 mL  Total OUT: 2375 mL    Total NET: -911 mL      04-14 @ 07:01  -  04-15 @ 00:45  --------------------------------------------------------  IN:    dexmedetomidine Infusion: 15.5 mL    Enteral Tube Flush: 60 mL    Nepro with Carb Steady: 180 mL  Total IN: 255.5 mL    OUT:    Indwelling Catheter - Urethral: 520 mL  Total OUT: 520 mL    Total NET: -264.5 mL          04-14    137  |  97  |  82<H>  ----------------------------<  154<H>  3.4<L>   |  21<L>  |  3.71<H>    Ca    8.4      14 Apr 2020 13:23  Phos  5.2     04-14  Mg     2.1     04-14    TPro  5.6<L>  /  Alb  2.1<L>  /  TBili  0.5  /  DBili  x   /  AST  209<H>  /  ALT  181<H>  /  AlkPhos  1066<H>  04-14    [x] Glass catheter, indication: urine output monitoring in critically ill patient.  Meds: calcium acetate 667 milliGRAM(s) Oral two times a day with meals  dextrose 5%. 1000 milliLiter(s) IV Continuous <Continuous>  sodium bicarbonate 1300 milliGRAM(s) Oral three times a day    Creatine Kinase, Serum: 123 (04-13-20 @ 03:12)        HEMATOLOGIC  Meds/VTE prophylaxis: aspirin  chewable 81 milliGRAM(s) Enteral Tube daily  heparin  Injectable 7500 Unit(s) SubCutaneous every 12 hours                          7.1    13.95 )-----------( 199      ( 14 Apr 2020 01:54 )             22.0     PT/INR - ( 13 Apr 2020 03:12 )   PT: 12.5 sec;   INR: 1.08 ratio         PTT - ( 13 Apr 2020 03:12 )  PTT:50.5 sec  539017-50-11 @ 08:36        INFECTIOUS DISEASES  T(C): 36.7, Max: 36.7 (04-14-20 @ 16:00)  WBC Count: 13.95 (04-14-20 @ 01:54)  WBC Count: 12.76 (04-13-20 @ 03:12)    Recent Cultures:  Specimen Source: .Urine Catheterized, 04-13 @ 20:42; Results   <10,000 CFU/mL Normal Urogenital Becca; Gram Stain: --; Organism: --  Specimen Source: .Blood Triple Lumen BROWN, 04-13 @ 09:17; Results   No growth to date.; Gram Stain: --; Organism: --    Meds: meropenem  IVPB 500 milliGRAM(s) IV Intermittent every 24 hours    Procalcitonin, Serum: 0.77 ng/mL (04-14-20 @ 01:54)        ENDOCRINE  Fingersticks: 155, 192, 153, 175, 160  Meds: atorvastatin 20 milliGRAM(s) Oral at bedtime  dextrose 40% Gel 15 Gram(s) Oral once PRN  dextrose 50% Injectable 12.5 Gram(s) IV Push once  dextrose 50% Injectable 25 Gram(s) IV Push once  dextrose 50% Injectable 25 Gram(s) IV Push once  glucagon  Injectable 1 milliGRAM(s) IntraMuscular once PRN  insulin glargine Injectable (LANTUS) 8 Unit(s) SubCutaneous at bedtime  insulin lispro (HumaLOG) corrective regimen sliding scale   SubCutaneous every 6 hours  levothyroxine Injectable 100 MICROGram(s) IV Push at bedtime        ACCESS DEVICES:  [ ] Peripheral IV  [x] Central Venous Line	[ ] R	[ ] L	[ ] IJ	[ ] Fem	[ ] SC	Placed:   [x] Arterial Line		[ ] R	[ ] L	[ ] Fem	[ ] Rad	[ ] Ax	Placed:   [ ] [facility] HD Access             [ ] R [ ] L [ ] IJ  [ ] Fem     Placed:  [x] Urinary Catheter, Date Placed:   Necessity of urinary, arterial, and venous catheters discussed.      CODE STATUS:     IMAGING: HISTORY  65y Female with COVID-19(+) pneumonia requiring intubation.    24 HOUR EVENTS:    SUBJECTIVE/ROS: Due to intubation with sedation, subjective information was not able to be obtained from the patient. History was obtained, to the extent possible, from review of the chart and collateral sources of information.      NEURO  Exam: sedated, no acute distress  Meds: acetaminophen    Suspension .. 975 milliGRAM(s) Enteral Tube every 6 hours PRN Temp greater or equal to 38C (100.4F)  fentaNYL    Injectable 50 MICROGram(s) IV Push once  HYDROmorphone  Injectable 0.5 milliGRAM(s) IV Push every 4 hours PRN moderate to severe pain    Adequacy of sedation and pain control has been assessed and adjusted.      RESPIRATORY  RR: 28 (28 - 28)  SpO2: 98% (98% - 100%)  Exam: without dissynchrony  Mechanical Ventilation: Mode: CPAP with PS, FiO2: 30, PEEP: 5, PS: 10  Predicted Body Weight:  Tidal Volume/PBW ratio:  PaO2 to FiO2 ratio: 307  Extubation readiness assessed.  ABG - ( 14 Apr 2020 02:17 )  pH: 7.40  /  pCO2: 40    /  pO2: 92    / HCO3: 24    / Base Excess: .2    /  SaO2: 98      Lactate: x                CARDIOVASCULAR  HR: 97 (64 - 99)  ABP: 157/49 (157/49 - 179/61)  ABP(mean): 80 (80 - 100)  Cardiac Rhythm: sinus  Most recent QTc:   Meds: amLODIPine   Tablet  hydrALAZINE  hydrALAZINE Injectable PRN        GI/NUTRITION  Diet:   Most recent bowel movement:  Meds/stress ulcer prophylaxis: calcium acetate 667 milliGRAM(s) Oral two times a day with meals  dextrose 5%. 1000 milliLiter(s) IV Continuous <Continuous>  famotidine Injectable 20 milliGRAM(s) IV Push daily  lactulose Syrup 10 Gram(s) Oral every 6 hours  polyethylene glycol 3350 17 Gram(s) Oral two times a day  senna Syrup 10 milliLiter(s) Oral at bedtime  sodium bicarbonate 1300 milliGRAM(s) Oral three times a day        GENITOURINARY  I&O's Detail    04-13 @ 07:01  -  04-14 @ 07:00  --------------------------------------------------------  IN:    dexmedetomidine Infusion: 124 mL    Nepro with Carb Steady: 540 mL    Other: 800 mL  Total IN: 1464 mL    OUT:    Indwelling Catheter - Urethral: 575 mL    Other: 1800 mL  Total OUT: 2375 mL    Total NET: -911 mL      04-14 @ 07:01  -  04-15 @ 00:45  --------------------------------------------------------  IN:    dexmedetomidine Infusion: 15.5 mL    Enteral Tube Flush: 60 mL    Nepro with Carb Steady: 180 mL  Total IN: 255.5 mL    OUT:    Indwelling Catheter - Urethral: 520 mL  Total OUT: 520 mL    Total NET: -264.5 mL          04-14    137  |  97  |  82<H>  ----------------------------<  154<H>  3.4<L>   |  21<L>  |  3.71<H>    Ca    8.4      14 Apr 2020 13:23  Phos  5.2     04-14  Mg     2.1     04-14    TPro  5.6<L>  /  Alb  2.1<L>  /  TBili  0.5  /  DBili  x   /  AST  209<H>  /  ALT  181<H>  /  AlkPhos  1066<H>  04-14    [x] Glass catheter, indication: urine output monitoring in critically ill patient.  Meds: calcium acetate 667 milliGRAM(s) Oral two times a day with meals  dextrose 5%. 1000 milliLiter(s) IV Continuous <Continuous>  sodium bicarbonate 1300 milliGRAM(s) Oral three times a day    Creatine Kinase, Serum: 123 (04-13-20 @ 03:12)        HEMATOLOGIC  Meds/VTE prophylaxis: aspirin  chewable 81 milliGRAM(s) Enteral Tube daily  heparin  Injectable 7500 Unit(s) SubCutaneous every 12 hours                          7.1    13.95 )-----------( 199      ( 14 Apr 2020 01:54 )             22.0     PT/INR - ( 13 Apr 2020 03:12 )   PT: 12.5 sec;   INR: 1.08 ratio         PTT - ( 13 Apr 2020 03:12 )  PTT:50.5 sec  [id number] @ 08:36        INFECTIOUS DISEASES  T(C): 36.7, Max: 36.7 (04-14-20 @ 16:00)  WBC Count: 13.95 (04-14-20 @ 01:54)  WBC Count: 12.76 (04-13-20 @ 03:12)    Recent Cultures:  Specimen Source: .Urine Catheterized, 04-13 @ 20:42; Results   <10,000 CFU/mL Normal Urogenital Becca; Gram Stain: --; Organism: --  Specimen Source: .Blood Triple Lumen BROWN, 04-13 @ 09:17; Results   No growth to date.; Gram Stain: --; Organism: --    Meds: meropenem  IVPB 500 milliGRAM(s) IV Intermittent every 24 hours    Procalcitonin, Serum: 0.77 ng/mL (04-14-20 @ 01:54)        ENDOCRINE  Fingersticks: 155, 192, 153, 175, 160  Meds: atorvastatin 20 milliGRAM(s) Oral at bedtime  dextrose 40% Gel 15 Gram(s) Oral once PRN  dextrose 50% Injectable 12.5 Gram(s) IV Push once  dextrose 50% Injectable 25 Gram(s) IV Push once  dextrose 50% Injectable 25 Gram(s) IV Push once  glucagon  Injectable 1 milliGRAM(s) IntraMuscular once PRN  insulin glargine Injectable (LANTUS) 8 Unit(s) SubCutaneous at bedtime  insulin lispro (HumaLOG) corrective regimen sliding scale   SubCutaneous every 6 hours  levothyroxine Injectable 100 MICROGram(s) IV Push at bedtime        ACCESS DEVICES:  [ ] Peripheral IV  [x] Central Venous Line	[ ] R	[ ] L	[ ] IJ	[ ] Fem	[ ] SC	Placed:   [x] Arterial Line		[ ] R	[ ] L	[ ] Fem	[ ] Rad	[ ] Ax	Placed:   [ ] [facility] HD Access             [ ] R [ ] L [ ] IJ  [ ] Fem     Placed:  [x] Urinary Catheter, Date Placed:   Necessity of urinary, arterial, and venous catheters discussed.      CODE STATUS:     IMAGING: HISTORY  65y Female with COVID-19(+) pneumonia requiring intubation.    24 HOUR EVENTS:    SUBJECTIVE/ROS: Due to intubation with sedation, subjective information was not able to be obtained from the patient. History was obtained, to the extent possible, from review of the chart and collateral sources of information.      NEURO  Exam: sedated, no acute distress  Meds: acetaminophen    Suspension .. 975 milliGRAM(s) Enteral Tube every 6 hours PRN Temp greater or equal to 38C (100.4F)  fentaNYL    Injectable 50 MICROGram(s) IV Push once  HYDROmorphone  Injectable 0.5 milliGRAM(s) IV Push every 4 hours PRN moderate to severe pain    Adequacy of sedation and pain control has been assessed and adjusted.      RESPIRATORY  RR: 28 (28 - 28)  SpO2: 98% (98% - 100%)  Exam: without dissynchrony  Mechanical Ventilation: Mode: CPAP with PS, FiO2: 30, PEEP: 5, PS: 10  Predicted Body Weight:  Tidal Volume/PBW ratio:  PaO2 to FiO2 ratio: 307  Extubation readiness assessed.  ABG - ( 14 Apr 2020 02:17 )  pH: 7.40  /  pCO2: 40    /  pO2: 92    / HCO3: 24    / Base Excess: .2    /  SaO2: 98      Lactate: x                CARDIOVASCULAR  HR: 97 (64 - 99)  ABP: 157/49 (157/49 - 179/61)  ABP(mean): 80 (80 - 100)  Cardiac Rhythm: sinus  Most recent QTc:   Meds: amLODIPine   Tablet  hydrALAZINE  hydrALAZINE Injectable PRN        GI/NUTRITION  Diet:   Most recent bowel movement:  Meds/stress ulcer prophylaxis: calcium acetate 667 milliGRAM(s) Oral two times a day with meals  dextrose 5%. 1000 milliLiter(s) IV Continuous <Continuous>  famotidine Injectable 20 milliGRAM(s) IV Push daily  lactulose Syrup 10 Gram(s) Oral every 6 hours  polyethylene glycol 3350 17 Gram(s) Oral two times a day  senna Syrup 10 milliLiter(s) Oral at bedtime  sodium bicarbonate 1300 milliGRAM(s) Oral three times a day        GENITOURINARY  I&O's Detail    04-13 @ 07:01  -  04-14 @ 07:00  --------------------------------------------------------  IN:    dexmedetomidine Infusion: 124 mL    Nepro with Carb Steady: 540 mL    Other: 800 mL  Total IN: 1464 mL    OUT:    Indwelling Catheter - Urethral: 575 mL    Other: 1800 mL  Total OUT: 2375 mL    Total NET: -911 mL      04-14 @ 07:01  -  04-15 @ 00:45  --------------------------------------------------------  IN:    dexmedetomidine Infusion: 15.5 mL    Enteral Tube Flush: 60 mL    Nepro with Carb Steady: 180 mL  Total IN: 255.5 mL    OUT:    Indwelling Catheter - Urethral: 520 mL  Total OUT: 520 mL    Total NET: -264.5 mL    Gen: NAD  HEENT: + ETT  lungs: Decreased breath sounds b/l. No rales, crackles or wheeze appreciated.   Heart RRR, S1 and S2  GI: soft ND  Lymph: trace edema  psych: not responsive.   skin: + lines    04-14    137  |  97  |  82<H>  ----------------------------<  154<H>  3.4<L>   |  21<L>  |  3.71<H>    Ca    8.4      14 Apr 2020 13:23  Phos  5.2     04-14  Mg     2.1     04-14    TPro  5.6<L>  /  Alb  2.1<L>  /  TBili  0.5  /  DBili  x   /  AST  209<H>  /  ALT  181<H>  /  AlkPhos  1066<H>  04-14    [x] Glass catheter, indication: urine output monitoring in critically ill patient.  Meds: calcium acetate 667 milliGRAM(s) Oral two times a day with meals  dextrose 5%. 1000 milliLiter(s) IV Continuous <Continuous>  sodium bicarbonate 1300 milliGRAM(s) Oral three times a day    Creatine Kinase, Serum: 123 (04-13-20 @ 03:12)        HEMATOLOGIC  Meds/VTE prophylaxis: aspirin  chewable 81 milliGRAM(s) Enteral Tube daily  heparin  Injectable 7500 Unit(s) SubCutaneous every 12 hours                          7.1    13.95 )-----------( 199      ( 14 Apr 2020 01:54 )             22.0     PT/INR - ( 13 Apr 2020 03:12 )   PT: 12.5 sec;   INR: 1.08 ratio         PTT - ( 13 Apr 2020 03:12 )  PTT:50.5 sec  [id number] @ 08:36        INFECTIOUS DISEASES  T(C): 36.7, Max: 36.7 (04-14-20 @ 16:00)  WBC Count: 13.95 (04-14-20 @ 01:54)  WBC Count: 12.76 (04-13-20 @ 03:12)    Recent Cultures:  Specimen Source: .Urine Catheterized, 04-13 @ 20:42; Results   <10,000 CFU/mL Normal Urogenital Becca; Gram Stain: --; Organism: --  Specimen Source: .Blood Triple Lumen BROWN, 04-13 @ 09:17; Results   No growth to date.; Gram Stain: --; Organism: --    Meds: meropenem  IVPB 500 milliGRAM(s) IV Intermittent every 24 hours    Procalcitonin, Serum: 0.77 ng/mL (04-14-20 @ 01:54)        ENDOCRINE  Fingersticks: 155, 192, 153, 175, 160  Meds: atorvastatin 20 milliGRAM(s) Oral at bedtime  dextrose 40% Gel 15 Gram(s) Oral once PRN  dextrose 50% Injectable 12.5 Gram(s) IV Push once  dextrose 50% Injectable 25 Gram(s) IV Push once  dextrose 50% Injectable 25 Gram(s) IV Push once  glucagon  Injectable 1 milliGRAM(s) IntraMuscular once PRN  insulin glargine Injectable (LANTUS) 8 Unit(s) SubCutaneous at bedtime  insulin lispro (HumaLOG) corrective regimen sliding scale   SubCutaneous every 6 hours  levothyroxine Injectable 100 MICROGram(s) IV Push at bedtime        ACCESS DEVICES:  [ ] Peripheral IV  [x] Central Venous Line	[ ] R	[ ] L	[ ] IJ	[ ] Fem	[ ] SC	Placed:   [x] Arterial Line		[ ] R	[ ] L	[ ] Fem	[ ] Rad	[ ] Ax	Placed:   [ ] [facility] HD Access             [ ] R [ ] L [ ] IJ  [ ] Fem     Placed:  [x] Urinary Catheter, Date Placed:   Necessity of urinary, arterial, and venous catheters discussed.      CODE STATUS:     IMAGING:

## 2020-04-15 NOTE — PROGRESS NOTE ADULT - SUBJECTIVE AND OBJECTIVE BOX
[facility] DIVISION OF KIDNEY DISEASES AND HYPERTENSION -- PROGRESS NOTE    Chief complaint: COVID sepsis    24 hour events/subjective:  maintaining UO      PAST HISTORY  --------------------------------------------------------------------------------  No significant changes to PMH, PSH, FHx, SHx, unless otherwise noted    ALLERGIES & MEDICATIONS  --------------------------------------------------------------------------------  Allergies    No Known Allergies    Intolerances      Standing Inpatient Medications  amLODIPine   Tablet 10 milliGRAM(s) Oral daily  artificial  tears Solution 1 Drop(s) Both EYES two times a day  aspirin  chewable 81 milliGRAM(s) Enteral Tube daily  calcium acetate 667 milliGRAM(s) Oral two times a day with meals  chlorhexidine 0.12% Liquid 15 milliLiter(s) Oral Mucosa every 12 hours  dextrose 5%. 1000 milliLiter(s) IV Continuous <Continuous>  dextrose 50% Injectable 12.5 Gram(s) IV Push once  dextrose 50% Injectable 25 Gram(s) IV Push once  dextrose 50% Injectable 25 Gram(s) IV Push once  famotidine Injectable 20 milliGRAM(s) IV Push daily  heparin  Infusion 1200 Unit(s)/Hr IV Continuous <Continuous>  hydrALAZINE 50 milliGRAM(s) Oral every 6 hours  insulin glargine Injectable (LANTUS) 8 Unit(s) SubCutaneous at bedtime  insulin lispro (HumaLOG) corrective regimen sliding scale   SubCutaneous every 6 hours  levothyroxine Injectable 100 MICROGram(s) IV Push at bedtime  meropenem  IVPB 500 milliGRAM(s) IV Intermittent every 24 hours  metoprolol tartrate 25 milliGRAM(s) Oral two times a day  polyethylene glycol 3350 17 Gram(s) Oral two times a day  senna Syrup 10 milliLiter(s) Oral at bedtime  sodium bicarbonate 1300 milliGRAM(s) Oral three times a day    PRN Inpatient Medications  acetaminophen    Suspension .. 975 milliGRAM(s) Enteral Tube every 6 hours PRN  dextrose 40% Gel 15 Gram(s) Oral once PRN  glucagon  Injectable 1 milliGRAM(s) IntraMuscular once PRN  hydrALAZINE Injectable 10 milliGRAM(s) IV Push every 6 hours PRN      REVIEW OF SYSTEMS  --------------------------------------------------------------------------------  Constitutional: [ ] Fever [ ] Chills [ ] Fatigue [ ] Weight change   HEENT: [ ] Blurred vision [ ] Eye Pain [ ] Headache [ ] Runny nose [ ] Sore Throat   Respiratory: [ ] Cough [ ] Wheezing [ ] Shortness of breath  Cardiovascular: [ ] Chest Pain [ ] Palpitations [ ] LOPEZ [ ] PND [ ] Orthopnea  Gastrointestinal: [ ] Abdominal Pain [ ] Diarrhea [ ] Constipation [ ] Hemorrhoids [ ] Nausea [ ] Vomiting  Genitourinary: [ ] Nocturia [ ] Dysuria [ ] Incontinence  Extremities: [ ] Swelling [ ] Joint Pain  Neurologic: [ ] Focal deficit [ ] Paresthesias [ ] Syncope  Lymphatic: [ ] Swelling [ ] Lymphadenopathy   Skin: [ ] Rash [ ] Ecchymoses [ ] Wounds [ ] Lesions  Psychiatry: [ ] Depression [ ] Suicidal/Homicidal Ideation [ ] Anxiety [ ] Sleep Disturbances  [ ] 10 point review of systems is otherwise negative except as mentioned above              [x ]Unable to obtain due to patient intubated  All other systems were reviewed and are negative, except as noted.    VITALS/PHYSICAL EXAM  --------------------------------------------------------------------------------  T(C): 36.6 (04-15-20 @ 11:00), Max: 37.1 (04-15-20 @ 07:00)  HR: 91 (04-15-20 @ 11:00) (90 - 112)  BP: --  RR: 20 (04-15-20 @ 11:00) (20 - 20)  SpO2: 96% (04-15-20 @ 11:00) (94% - 100%)  Wt(kg): --        04-14-20 @ 07:01  -  04-15-20 @ 07:00  --------------------------------------------------------  IN: 815.5 mL / OUT: 995 mL / NET: -179.5 mL    04-15-20 @ 07:01  -  04-15-20 @ 14:27  --------------------------------------------------------  IN: 110 mL / OUT: 250 mL / NET: -140 mL    LABS/STUDIES  --------------------------------------------------------------------------------              7.2    12.47 >-----------<  261      [04-15-20 @ 02:23]              23.4     139  |  98  |  86  ----------------------------<  191      [04-15-20 @ 02:23]  3.3   |  24  |  3.96        Ca     8.4     [04-15-20 @ 02:23]      Mg     2.3     [04-15-20 @ 02:23]      Phos  5.5     [04-15-20 @ 02:23]    TPro  5.6  /  Alb  2.4  /  TBili  0.5  /  DBili  0.4  /  AST  263  /  ALT  214  /  AlkPhos  1184  [04-15-20 @ 02:23]    PT/INR: PT 11.9 , INR 1.03       [04-15-20 @ 02:23]  PTT: 56.5       [04-15-20 @ 02:23]    CK 32      [04-15-20 @ 02:23]        [04-15-20 @ 02:23]    Creatinine Trend:  SCr 3.96 [04-15 @ 02:23]  SCr 3.71 [04-14 @ 13:23]  SCr 3.60 [04-14 @ 01:54]  SCr 5.13 [04-13 @ 12:56]  SCr 5.31 [04-13 @ 03:12]    Urinalysis - [04-13-20 @ 13:58]      Color Yellow / Appearance Slightly Turbid / SG 1.014 / pH 5.5      Gluc 100 mg/dL / Ketone Negative  / Bili Negative / Urobili Negative       Blood Negative / Protein 100 mg/dL / Leuk Est Large / Nitrite Negative      RBC 2 / WBC 49 / Hyaline 10 / Gran  / Sq Epi  / Non Sq Epi 3 / Bacteria Negative    Urine Creatinine 36      [04-14-20 @ 13:23]  Urine Protein 354      [04-14-20 @ 13:23]    Ferritin 1516      [04-13-20 @ 08:36]  TSH 4.06      [04-14-20 @ 16:12]  Lipid: chol --, , HDL --, LDL --      [04-15-20 @ 02:23]    HBsAb <3.0      [04-10-20 @ 00:19]  HBsAg Nonreact      [04-10-20 @ 00:19]  HBcAb Nonreact      [04-10-20 @ 00:19]  HCV 0.14, Nonreact      [04-10-20 @ 00:19]    C3 Complement 104      [04-14-20 @ 15:58]  C4 Complement 15      [04-14-20 @ 15:58]

## 2020-04-15 NOTE — PROGRESS NOTE ADULT - ASSESSMENT
65F PMHx DM2, HTN, HLD who present to ED for fever/dyspnea x 1 week.  Per HPI, pt desaturated at home O2 sat 70% which improved nasal cannula (another family member).  In ED hypoxia 70s on nasal cannula s/p intubation.  Covid positive with CXR b/l opacities. Given ceftriaxone, azithromycin, hydroxychlorquine.     Nephrology consulted for MARIO or MARIO on CKD.       MARIO/ Proteinuria    in setting septic shock Covid19 infection, low BP, also noted to have nephrotic range proteinuria  - s/p HD on 4/13, has stable labs since then, non oliguroic. No plan for HD today, will continue to reassess the need for RRT on daily basis  - Complements not low, HBSAg and HCV Ab not reactive. Will order GN work up.  - Monitor labs and urine output. Avoid NSAIDs, ACEI/ARBS, RCA and nephrotoxins. Dose vancomycin per level.      Metabolic acidosis   in the setting of MARIO   continue oral bicarbonate    Hyperphosphatemia  in the setting of MARIO   continue Ca acetate  Monitor serum phosphorus    Hypocalcemia   in the setting of hyperphosphatemia and hypoalbuminemia  - continue calcium acetate  - monitor ionized calcium, replete to keep >1.1

## 2020-04-15 NOTE — CHART NOTE - NSCHARTNOTEFT_GEN_A_CORE
Nutrition Follow Up Note   Patient seen for: nutrition follow up on COVID ICU     Source: medical record, communication with team. Unable to speak to pt due to current airborne isolation contact precautions related to COVID-19. Pt remains intubated.     Chart reviewed, events noted. 64 yo female with HTN, DM, HLD, who presented with SOB and was found to be COVID+, required intubation and mechanical ventilation    Diet Order: Diet, NPO with Tube Feed:   Tube Feeding Modality: Orogastric  Nepro with Carb Steady (NEPRORTH)  Total Volume for 24 Hours (mL): 720  Bolus  Total Volume of Bolus (mL):  180  Tube Feed Frequency: Every 6 hours   Tube Feed Start Time: 12:00  Bolus Feed Rate (mL per Hour): 180   Bolus Feed Duration (in Hours): 1 (04-10-20 @ 14:30)    CURRENT EN ORDER PROVIDES: 720 ml formula, 1296 calories (13 marisol/Kg per dosing wt 100Kg), 58 grams protein (1.3Gm/Kg per IBW 45.5Kg) - meets calorie needs    Nutrition Events:   - EN: tube feeding changed to bolus: Nepro @ 180ml q6 hrs  - Propofol: discontinued  - Stage 3 MARIO on CKD; Nephrology is following, assessing need for HD daily  - HD initiated 4/9 (-1300ml); Last HD 4/13 (-1000ml)  - Hyperphosphatemia noted; addressed with PhosLo Rx and renal EN formula  - Hyperglycemia being addressed with Lantus (8 units bedtime) and Humalog sliding scale     GI: Last BM 4/15. Bowel regimen: Miralax, senna    Anthropometric Measurements:   Height (cm): 152.4 (04-04-20 @ 04:42)  Weight (kg): 100 (04-03-20 @ 14:20)  BMI (kg/m2): 43.1 (04-04-20 @ 04:42)  IBW: 45.5Kg    Medications: MEDICATIONS  (STANDING):  amLODIPine   Tablet 10 milliGRAM(s) Oral daily  artificial  tears Solution 1 Drop(s) Both EYES two times a day  aspirin  chewable 81 milliGRAM(s) Enteral Tube daily  atorvastatin 20 milliGRAM(s) Oral at bedtime  calcium acetate 667 milliGRAM(s) Oral two times a day with meals  chlorhexidine 0.12% Liquid 15 milliLiter(s) Oral Mucosa every 12 hours  dextrose 5%. 1000 milliLiter(s) (50 mL/Hr) IV Continuous <Continuous>  dextrose 50% Injectable 12.5 Gram(s) IV Push once  dextrose 50% Injectable 25 Gram(s) IV Push once  dextrose 50% Injectable 25 Gram(s) IV Push once  famotidine Injectable 20 milliGRAM(s) IV Push daily  fentaNYL    Injectable 50 MICROGram(s) IV Push once  heparin  Injectable 7500 Unit(s) SubCutaneous every 12 hours  hydrALAZINE 50 milliGRAM(s) Oral every 8 hours  insulin glargine Injectable (LANTUS) 8 Unit(s) SubCutaneous at bedtime  insulin lispro (HumaLOG) corrective regimen sliding scale   SubCutaneous every 6 hours  levothyroxine Injectable 100 MICROGram(s) IV Push at bedtime  meropenem  IVPB 500 milliGRAM(s) IV Intermittent every 24 hours  polyethylene glycol 3350 17 Gram(s) Oral two times a day  senna Syrup 10 milliLiter(s) Oral at bedtime  sodium bicarbonate 1300 milliGRAM(s) Oral three times a day    MEDICATIONS  (PRN):  acetaminophen    Suspension .. 975 milliGRAM(s) Enteral Tube every 6 hours PRN Temp greater or equal to 38C (100.4F)  dextrose 40% Gel 15 Gram(s) Oral once PRN Blood Glucose LESS THAN 70 milliGRAM(s)/deciliter  glucagon  Injectable 1 milliGRAM(s) IntraMuscular once PRN Glucose LESS THAN 70 milligrams/deciliter  hydrALAZINE Injectable 10 milliGRAM(s) IV Push every 6 hours PRN SBP>170  HYDROmorphone  Injectable 0.5 milliGRAM(s) IV Push every 4 hours PRN moderate to severe pain    Labs: 04-15 @ 02:23: Sodium 139, Potassium 3.3<L>, Calcium 8.4, Magnesium 2.3, Phosphorus 5.5<H>, BUN 86<H>, Creatinine 3.96<H>, Glucose 191<H>, Alk Phos 1184<H>, ALT/SGPT 214<H>, AST/SGOT 263<H>, Albumin 2.4<L>, Total Bilirubin 0.5, Hemoglobin 7.2<L>, Hematocrit 23.4<L>, Creatine Kinase <<27>    Triglycerides, Serum: 121 mg/dL (04-15-20 @ 02:23)  Triglycerides, Serum: 180 mg/dL (04-13-20 @ 03:12)  Triglycerides, Serum: 144 mg/dL (04-11-20 @ 01:40)  Triglycerides, Serum: 157 mg/dL (04-10-20 @ 02:24)  Triglycerides, Serum: 142 mg/dL (04-08-20 @ 03:40)    POCT Blood Glucose.: 218 mg/dL (04-15-20 @ 04:24)  POCT Blood Glucose.: 155 mg/dL (04-14-20 @ 23:15)  POCT Blood Glucose.: 192 mg/dL (04-14-20 @ 16:47)  POCT Blood Glucose.: 153 mg/dL (04-14-20 @ 11:36)    Skin: no pressure injuries documented  Edema: 2+ generalized    Estimated Needs: with consideration for BMI>40, intubation  Energy: 9780-7889 marisol/day (11-14cal/Kg per dosing wt 100Kg)  Protein: 73-82 Gm/day (1.6-1.8 Gm/Kg per IBW 45.5Kg)  [place] Equation (REE): 1736 calories (17 marisol/Kg)    Previous Nutrition Diagnosis: Overweight/Obesity  Nutrition Diagnosis is: ongoing, being addressed with hypocaloric EN regimen    New Nutrition Diagnosis:  none    Recommended Interventions:   1. Continue Bolus EN: Nepro @ 180ml q6 hrs; provides 1296 calories (13 marisol/Kg per dosing wt 100Kg), 58 grams protein (1.3 Gm/Kg per IBW 45.5Kg)  2. If HD resumes, add 2 Prosource (120cal, 30Gm protein) for a total of 88 grams protein (1.9 Gm/Kg IBW) to meet increased protein needs for HD  3. Trend triglycerides      Monitoring and Evaluation:   Continue to monitor nutrition provision and tolerance, weights, labs, skin integrity.   RD remains available upon request and will follow up per protocol.    Wendy Belcher, MS GAMA CDN [facility]; Pager # [phone]

## 2020-04-15 NOTE — PROGRESS NOTE ADULT - ATTENDING COMMENTS
Hypoxic resp failure, vent dependent  on minimal vent settings peep 5, fi02 30  would try to minimize sedation and opioids.   CPAP today  elevated lfts, with slight rise, though have been high  stable anemia  hd per renal   no longer hypotensive, now hypertensive on hydral/norvasc. start metoprolol.   PNA? on meropenem. dc vanc  Further cardiac workup will depend on clinical course.

## 2020-04-16 LAB
ALBUMIN SERPL ELPH-MCNC: 2.2 G/DL — LOW (ref 3.3–5)
ALBUMIN SERPL ELPH-MCNC: 2.4 G/DL — LOW (ref 3.3–5)
ALBUMIN SERPL ELPH-MCNC: 2.4 G/DL — LOW (ref 3.3–5)
ALP SERPL-CCNC: 1023 U/L — HIGH (ref 40–120)
ALP SERPL-CCNC: 1215 U/L — HIGH (ref 40–120)
ALP SERPL-CCNC: 1254 U/L — HIGH (ref 40–120)
ALT FLD-CCNC: 207 U/L — HIGH (ref 10–45)
ALT FLD-CCNC: 225 U/L — HIGH (ref 10–45)
ALT FLD-CCNC: 228 U/L — HIGH (ref 10–45)
ANION GAP SERPL CALC-SCNC: 16 MMOL/L — SIGNIFICANT CHANGE UP (ref 5–17)
ANION GAP SERPL CALC-SCNC: 17 MMOL/L — SIGNIFICANT CHANGE UP (ref 5–17)
ANION GAP SERPL CALC-SCNC: 23 MMOL/L — HIGH (ref 5–17)
APPEARANCE UR: CLEAR — SIGNIFICANT CHANGE UP
APTT BLD: 64.7 SEC — HIGH (ref 27.5–36.3)
APTT BLD: 74.5 SEC — HIGH (ref 27.5–36.3)
APTT BLD: 86.4 SEC — HIGH (ref 27.5–36.3)
APTT BLD: 99.6 SEC — HIGH (ref 27.5–36.3)
AST SERPL-CCNC: 213 U/L — HIGH (ref 10–40)
AST SERPL-CCNC: 246 U/L — HIGH (ref 10–40)
AST SERPL-CCNC: 265 U/L — HIGH (ref 10–40)
BACTERIA # UR AUTO: NEGATIVE — SIGNIFICANT CHANGE UP
BILIRUB SERPL-MCNC: 0.4 MG/DL — SIGNIFICANT CHANGE UP (ref 0.2–1.2)
BILIRUB UR-MCNC: NEGATIVE — SIGNIFICANT CHANGE UP
BLD GP AB SCN SERPL QL: NEGATIVE — SIGNIFICANT CHANGE UP
BUN SERPL-MCNC: 94 MG/DL — HIGH (ref 7–23)
BUN SERPL-MCNC: 95 MG/DL — HIGH (ref 7–23)
BUN SERPL-MCNC: 96 MG/DL — HIGH (ref 7–23)
CALCIUM SERPL-MCNC: 8.3 MG/DL — LOW (ref 8.4–10.5)
CALCIUM SERPL-MCNC: 8.8 MG/DL — SIGNIFICANT CHANGE UP (ref 8.4–10.5)
CALCIUM SERPL-MCNC: 8.9 MG/DL — SIGNIFICANT CHANGE UP (ref 8.4–10.5)
CHLORIDE SERPL-SCNC: 100 MMOL/L — SIGNIFICANT CHANGE UP (ref 96–108)
CHLORIDE SERPL-SCNC: 98 MMOL/L — SIGNIFICANT CHANGE UP (ref 96–108)
CHLORIDE SERPL-SCNC: 99 MMOL/L — SIGNIFICANT CHANGE UP (ref 96–108)
CO2 SERPL-SCNC: 20 MMOL/L — LOW (ref 22–31)
CO2 SERPL-SCNC: 22 MMOL/L — SIGNIFICANT CHANGE UP (ref 22–31)
CO2 SERPL-SCNC: 25 MMOL/L — SIGNIFICANT CHANGE UP (ref 22–31)
COLOR SPEC: YELLOW — SIGNIFICANT CHANGE UP
CREAT SERPL-MCNC: 4.28 MG/DL — HIGH (ref 0.5–1.3)
CREAT SERPL-MCNC: 4.39 MG/DL — HIGH (ref 0.5–1.3)
CREAT SERPL-MCNC: 4.52 MG/DL — HIGH (ref 0.5–1.3)
CRP SERPL-MCNC: 1.26 MG/DL — HIGH (ref 0–0.4)
DIFF PNL FLD: NEGATIVE — SIGNIFICANT CHANGE UP
EPI CELLS # UR: 1 /HPF — SIGNIFICANT CHANGE UP
FERRITIN SERPL-MCNC: 2292 NG/ML — HIGH (ref 15–150)
GAS PNL BLDA: SIGNIFICANT CHANGE UP
GAS PNL BLDA: SIGNIFICANT CHANGE UP
GLUCOSE BLDC GLUCOMTR-MCNC: 225 MG/DL — HIGH (ref 70–99)
GLUCOSE BLDC GLUCOMTR-MCNC: 246 MG/DL — HIGH (ref 70–99)
GLUCOSE BLDC GLUCOMTR-MCNC: 250 MG/DL — HIGH (ref 70–99)
GLUCOSE BLDC GLUCOMTR-MCNC: 253 MG/DL — HIGH (ref 70–99)
GLUCOSE BLDC GLUCOMTR-MCNC: 265 MG/DL — HIGH (ref 70–99)
GLUCOSE SERPL-MCNC: 242 MG/DL — HIGH (ref 70–99)
GLUCOSE SERPL-MCNC: 243 MG/DL — HIGH (ref 70–99)
GLUCOSE SERPL-MCNC: 251 MG/DL — HIGH (ref 70–99)
GLUCOSE UR QL: ABNORMAL
HCT VFR BLD CALC: 20.8 % — CRITICAL LOW (ref 34.5–45)
HCT VFR BLD CALC: 23.4 % — LOW (ref 34.5–45)
HCT VFR BLD CALC: 24 % — LOW (ref 34.5–45)
HGB BLD-MCNC: 6.3 G/DL — CRITICAL LOW (ref 11.5–15.5)
HGB BLD-MCNC: 7.4 G/DL — LOW (ref 11.5–15.5)
HGB BLD-MCNC: 7.4 G/DL — LOW (ref 11.5–15.5)
HYALINE CASTS # UR AUTO: 4 /LPF — HIGH (ref 0–2)
INR BLD: 1.17 RATIO — HIGH (ref 0.88–1.16)
KETONES UR-MCNC: NEGATIVE — SIGNIFICANT CHANGE UP
LDH SERPL L TO P-CCNC: 355 U/L — HIGH (ref 50–242)
LEUKOCYTE ESTERASE UR-ACNC: NEGATIVE — SIGNIFICANT CHANGE UP
MAGNESIUM SERPL-MCNC: 2.3 MG/DL — SIGNIFICANT CHANGE UP (ref 1.6–2.6)
MCHC RBC-ENTMCNC: 25.2 PG — LOW (ref 27–34)
MCHC RBC-ENTMCNC: 25.7 PG — LOW (ref 27–34)
MCHC RBC-ENTMCNC: 26.1 PG — LOW (ref 27–34)
MCHC RBC-ENTMCNC: 30.3 GM/DL — LOW (ref 32–36)
MCHC RBC-ENTMCNC: 30.8 GM/DL — LOW (ref 32–36)
MCHC RBC-ENTMCNC: 31.6 GM/DL — LOW (ref 32–36)
MCV RBC AUTO: 82.4 FL — SIGNIFICANT CHANGE UP (ref 80–100)
MCV RBC AUTO: 83.2 FL — SIGNIFICANT CHANGE UP (ref 80–100)
MCV RBC AUTO: 83.3 FL — SIGNIFICANT CHANGE UP (ref 80–100)
NITRITE UR-MCNC: NEGATIVE — SIGNIFICANT CHANGE UP
NRBC # BLD: 0 /100 WBCS — SIGNIFICANT CHANGE UP (ref 0–0)
PH UR: 6.5 — SIGNIFICANT CHANGE UP (ref 5–8)
PHOSPHATE SERPL-MCNC: 4.4 MG/DL — SIGNIFICANT CHANGE UP (ref 2.5–4.5)
PHOSPHATE SERPL-MCNC: 4.8 MG/DL — HIGH (ref 2.5–4.5)
PHOSPHATE SERPL-MCNC: 5 MG/DL — HIGH (ref 2.5–4.5)
PLATELET # BLD AUTO: 241 K/UL — SIGNIFICANT CHANGE UP (ref 150–400)
PLATELET # BLD AUTO: 308 K/UL — SIGNIFICANT CHANGE UP (ref 150–400)
PLATELET # BLD AUTO: 310 K/UL — SIGNIFICANT CHANGE UP (ref 150–400)
POTASSIUM SERPL-MCNC: 3.3 MMOL/L — LOW (ref 3.5–5.3)
POTASSIUM SERPL-MCNC: 3.6 MMOL/L — SIGNIFICANT CHANGE UP (ref 3.5–5.3)
POTASSIUM SERPL-MCNC: 3.6 MMOL/L — SIGNIFICANT CHANGE UP (ref 3.5–5.3)
POTASSIUM SERPL-SCNC: 3.3 MMOL/L — LOW (ref 3.5–5.3)
POTASSIUM SERPL-SCNC: 3.6 MMOL/L — SIGNIFICANT CHANGE UP (ref 3.5–5.3)
POTASSIUM SERPL-SCNC: 3.6 MMOL/L — SIGNIFICANT CHANGE UP (ref 3.5–5.3)
PROCALCITONIN SERPL-MCNC: 0.6 NG/ML — HIGH (ref 0.02–0.1)
PROCALCITONIN SERPL-MCNC: 0.6 NG/ML — HIGH (ref 0.02–0.1)
PROT SERPL-MCNC: 5.2 G/DL — LOW (ref 6–8.3)
PROT SERPL-MCNC: 5.7 G/DL — LOW (ref 6–8.3)
PROT SERPL-MCNC: 5.7 G/DL — LOW (ref 6–8.3)
PROT UR-MCNC: ABNORMAL
PROTHROM AB SERPL-ACNC: 13.4 SEC — HIGH (ref 10–12.9)
RBC # BLD: 2.5 M/UL — LOW (ref 3.8–5.2)
RBC # BLD: 2.84 M/UL — LOW (ref 3.8–5.2)
RBC # BLD: 2.88 M/UL — LOW (ref 3.8–5.2)
RBC # FLD: 14.7 % — HIGH (ref 10.3–14.5)
RBC # FLD: 14.7 % — HIGH (ref 10.3–14.5)
RBC # FLD: 14.9 % — HIGH (ref 10.3–14.5)
RBC CASTS # UR COMP ASSIST: 6 /HPF — HIGH (ref 0–4)
RH IG SCN BLD-IMP: POSITIVE — SIGNIFICANT CHANGE UP
RH IG SCN BLD-IMP: POSITIVE — SIGNIFICANT CHANGE UP
SODIUM SERPL-SCNC: 138 MMOL/L — SIGNIFICANT CHANGE UP (ref 135–145)
SODIUM SERPL-SCNC: 139 MMOL/L — SIGNIFICANT CHANGE UP (ref 135–145)
SODIUM SERPL-SCNC: 143 MMOL/L — SIGNIFICANT CHANGE UP (ref 135–145)
SP GR SPEC: 1.02 — SIGNIFICANT CHANGE UP (ref 1.01–1.02)
UROBILINOGEN FLD QL: NEGATIVE — SIGNIFICANT CHANGE UP
VANCOMYCIN FLD-MCNC: 13.4 UG/ML — SIGNIFICANT CHANGE UP
VANCOMYCIN FLD-MCNC: 14.2 UG/ML — SIGNIFICANT CHANGE UP
WBC # BLD: 10.57 K/UL — HIGH (ref 3.8–10.5)
WBC # BLD: 13.96 K/UL — HIGH (ref 3.8–10.5)
WBC # BLD: 14.23 K/UL — HIGH (ref 3.8–10.5)
WBC # FLD AUTO: 10.57 K/UL — HIGH (ref 3.8–10.5)
WBC # FLD AUTO: 13.96 K/UL — HIGH (ref 3.8–10.5)
WBC # FLD AUTO: 14.23 K/UL — HIGH (ref 3.8–10.5)
WBC UR QL: 25 /HPF — HIGH (ref 0–5)

## 2020-04-16 PROCEDURE — 99291 CRITICAL CARE FIRST HOUR: CPT | Mod: 25

## 2020-04-16 PROCEDURE — 99232 SBSQ HOSP IP/OBS MODERATE 35: CPT | Mod: GC

## 2020-04-16 PROCEDURE — 99223 1ST HOSP IP/OBS HIGH 75: CPT

## 2020-04-16 PROCEDURE — 99292 CRITICAL CARE ADDL 30 MIN: CPT

## 2020-04-16 RX ORDER — INSULIN GLARGINE 100 [IU]/ML
8 INJECTION, SOLUTION SUBCUTANEOUS
Refills: 0 | Status: DISCONTINUED | OUTPATIENT
Start: 2020-04-16 | End: 2020-04-19

## 2020-04-16 RX ORDER — HEPARIN SODIUM 5000 [USP'U]/ML
600 INJECTION INTRAVENOUS; SUBCUTANEOUS
Qty: 25000 | Refills: 0 | Status: DISCONTINUED | OUTPATIENT
Start: 2020-04-16 | End: 2020-04-16

## 2020-04-16 RX ORDER — CARVEDILOL PHOSPHATE 80 MG/1
12.5 CAPSULE, EXTENDED RELEASE ORAL EVERY 12 HOURS
Refills: 0 | Status: DISCONTINUED | OUTPATIENT
Start: 2020-04-16 | End: 2020-04-17

## 2020-04-16 RX ORDER — CARVEDILOL PHOSPHATE 80 MG/1
12.5 CAPSULE, EXTENDED RELEASE ORAL EVERY 12 HOURS
Refills: 0 | Status: DISCONTINUED | OUTPATIENT
Start: 2020-04-16 | End: 2020-04-16

## 2020-04-16 RX ORDER — VANCOMYCIN HCL 1 G
1000 VIAL (EA) INTRAVENOUS ONCE
Refills: 0 | Status: COMPLETED | OUTPATIENT
Start: 2020-04-16 | End: 2020-04-16

## 2020-04-16 RX ADMIN — Medication 6: at 12:03

## 2020-04-16 RX ADMIN — Medication 50 MILLIGRAM(S): at 22:18

## 2020-04-16 RX ADMIN — Medication 25 MILLIGRAM(S): at 04:32

## 2020-04-16 RX ADMIN — Medication 1300 MILLIGRAM(S): at 04:32

## 2020-04-16 RX ADMIN — HEPARIN SODIUM 6 UNIT(S)/HR: 5000 INJECTION INTRAVENOUS; SUBCUTANEOUS at 14:49

## 2020-04-16 RX ADMIN — Medication 50 MILLIGRAM(S): at 14:01

## 2020-04-16 RX ADMIN — INSULIN GLARGINE 8 UNIT(S): 100 INJECTION, SOLUTION SUBCUTANEOUS at 22:18

## 2020-04-16 RX ADMIN — CHLORHEXIDINE GLUCONATE 15 MILLILITER(S): 213 SOLUTION TOPICAL at 17:31

## 2020-04-16 RX ADMIN — Medication 50 MILLIGRAM(S): at 17:28

## 2020-04-16 RX ADMIN — FAMOTIDINE 20 MILLIGRAM(S): 10 INJECTION INTRAVENOUS at 11:39

## 2020-04-16 RX ADMIN — Medication 50 MILLIGRAM(S): at 21:54

## 2020-04-16 RX ADMIN — Medication 6: at 22:20

## 2020-04-16 RX ADMIN — Medication 4: at 17:29

## 2020-04-16 RX ADMIN — Medication 667 MILLIGRAM(S): at 06:20

## 2020-04-16 RX ADMIN — Medication 667 MILLIGRAM(S): at 17:30

## 2020-04-16 RX ADMIN — Medication 100 MICROGRAM(S): at 21:54

## 2020-04-16 RX ADMIN — MEROPENEM 100 MILLIGRAM(S): 1 INJECTION INTRAVENOUS at 11:40

## 2020-04-16 RX ADMIN — Medication 1300 MILLIGRAM(S): at 14:00

## 2020-04-16 RX ADMIN — Medication 4: at 04:32

## 2020-04-16 RX ADMIN — Medication 81 MILLIGRAM(S): at 11:39

## 2020-04-16 RX ADMIN — CHLORHEXIDINE GLUCONATE 15 MILLILITER(S): 213 SOLUTION TOPICAL at 04:31

## 2020-04-16 RX ADMIN — Medication 250 MILLIGRAM(S): at 04:42

## 2020-04-16 RX ADMIN — Medication 10 MILLIGRAM(S): at 00:00

## 2020-04-16 RX ADMIN — Medication 1300 MILLIGRAM(S): at 21:54

## 2020-04-16 RX ADMIN — Medication 50 MILLIGRAM(S): at 11:39

## 2020-04-16 RX ADMIN — INSULIN GLARGINE 8 UNIT(S): 100 INJECTION, SOLUTION SUBCUTANEOUS at 10:36

## 2020-04-16 RX ADMIN — AMLODIPINE BESYLATE 10 MILLIGRAM(S): 2.5 TABLET ORAL at 04:31

## 2020-04-16 RX ADMIN — CARVEDILOL PHOSPHATE 12.5 MILLIGRAM(S): 80 CAPSULE, EXTENDED RELEASE ORAL at 17:30

## 2020-04-16 RX ADMIN — Medication 1 DROP(S): at 17:30

## 2020-04-16 RX ADMIN — SENNA PLUS 10 MILLILITER(S): 8.6 TABLET ORAL at 21:54

## 2020-04-16 RX ADMIN — Medication 1 DROP(S): at 04:31

## 2020-04-16 RX ADMIN — Medication 975 MILLIGRAM(S): at 14:14

## 2020-04-16 RX ADMIN — Medication 50 MILLIGRAM(S): at 04:31

## 2020-04-16 NOTE — PROGRESS NOTE ADULT - SUBJECTIVE AND OBJECTIVE BOX
HISTORY  65y Female    24 HOUR EVENTS:  Put back on full vent support overnight. Tolerated CPAP throughout day. SBT in am     SUBJECTIVE/ROS:  [x ] Due to altered mental status/intubation, subjective information were not able to be obtained from the patient. History was obtained, to the extent possible, from review of the chart and collateral sources of information.      NEURO  Exam:   Meds: No sedation   acetaminophen    Suspension .. 975 milliGRAM(s) Enteral Tube every 6 hours PRN Temp greater or equal to 38C (100.4F)    [x] Adequacy of sedation and pain control has been assessed and adjusted      RESPIRATORY  RR: 29 (04-15-20 @ 20:00) (15 - 29)  SpO2: 97% (04-15-20 @ 21:10) (94% - 100%)  Wt(kg): --  Exam:    Mechanical Ventilation: Mode: AC/ CMV (Assist Control/ Continuous Mandatory Ventilation), RR (machine): 26, RR (patient): 26, TV (machine): 380, FiO2: 30, PEEP: 5, ITime: 1, MAP: 9, PIP: 16  ABG - ( 15 Apr 2020 14:34 )  pH: 7.40  /  pCO2: 42    /  pO2: 140   / HCO3: 26    / Base Excess: 1.3   /  SaO2: 99          [ x] Extubation Readiness Assessed      CARDIOVASCULAR  HR: 129 (04-15-20 @ 21:10) (89 - 134)  BP: --  BP(mean): --  ABP: 189/68 (04-15-20 @ 20:00) (163/60 - 189/68)  ABP(mean): 111 (04-15-20 @ 20:00) (92 - 111)  Wt(kg): --  CVP(cm H2O): --      Exam:  Cardiac Rhythm:  Perfusion     [ ]Adequate   [ ]Inadequate  Mentation   [ ]Normal       [ ]Reduced  Extremities  [ ]Warm         [ ]Cool  Volume Status [ ]Hypervolemic [ ]Euvolemic [ ]Hypovolemic  Meds: amLODIPine   Tablet 10 milliGRAM(s) Oral daily  hydrALAZINE 50 milliGRAM(s) Oral every 6 hours  hydrALAZINE Injectable 10 milliGRAM(s) IV Push every 6 hours PRN SBP>170  metoprolol tartrate 25 milliGRAM(s) Oral two times a day        GI/NUTRITION  Exam:  Diet:  Meds: famotidine Injectable 20 milliGRAM(s) IV Push daily  senna Syrup 10 milliLiter(s) Oral at bedtime      GENITOURINARY  I&O's Detail    04-14 @ 07:01  -  04-15 @ 07:00  --------------------------------------------------------  IN:    dexmedetomidine Infusion: 15.5 mL    Enteral Tube Flush: 260 mL    Nepro with Carb Steady: 540 mL  Total IN: 815.5 mL    OUT:    Indwelling Catheter - Urethral: 995 mL  Total OUT: 995 mL    Total NET: -179.5 mL      04-15 @ 07:01 - 04-16 @ 00:46  --------------------------------------------------------  IN:    Enteral Tube Flush: 240 mL    heparin Infusion: 96 mL    Nepro with Carb Steady: 360 mL    Solution: 50 mL  Total IN: 746 mL    OUT:    Indwelling Catheter - Urethral: 845 mL  Total OUT: 845 mL    Total NET: -99 mL          04-15    139  |  97  |  88<H>  ----------------------------<  174<H>  3.3<L>   |  23  |  4.19<H>    Ca    9.1      15 Apr 2020 15:00  Phos  6.0     04-15  Mg     2.4     04-15    TPro  5.6<L>  /  Alb  2.3<L>  /  TBili  0.4  /  DBili  0.3<H>  /  AST  246<H>  /  ALT  226<H>  /  AlkPhos  1134<H>  04-15    [ ] Glass catheter, indication:   Meds: calcium acetate 667 milliGRAM(s) Oral two times a day with meals  dextrose 5%. 1000 milliLiter(s) IV Continuous <Continuous>  sodium bicarbonate 1300 milliGRAM(s) Oral three times a day        HEMATOLOGIC  Meds: aspirin  chewable 81 milliGRAM(s) Enteral Tube daily  heparin  Infusion 1200 Unit(s)/Hr IV Continuous <Continuous>    [x] VTE Prophylaxis                        7.1    13.74 )-----------( 282      ( 15 Apr 2020 15:00 )             23.0     PT/INR - ( 15 Apr 2020 17:38 )   PT: 13.1 sec;   INR: 1.14 ratio         PTT - ( 15 Apr 2020 20:55 )  PTT:194.5 sec  Transfusion     [ ] PRBC   [ ] Platelets   [ ] FFP   [ ] Cryoprecipitate      INFECTIOUS DISEASES  T(C): 36.8 (04-15-20 @ 20:00), Max: 37.1 (04-15-20 @ 07:00)  Wt(kg): --  WBC Count: 13.74 K/uL (04-15 @ 15:00)  WBC Count: 12.47 K/uL (04-15 @ 02:23)    Recent Cultures:  Specimen Source: .Urine Catheterized, 04-13 @ 20:42; Results   <10,000 CFU/mL Normal Urogenital Becca; Gram Stain: --; Organism: --  Specimen Source: .Blood Triple Lumen BROWN, 04-13 @ 09:17; Results   No growth to date.; Gram Stain: --; Organism: --    Meds: meropenem  IVPB 500 milliGRAM(s) IV Intermittent every 24 hours        ENDOCRINE  Capillary Blood Glucose    Meds: dextrose 40% Gel 15 Gram(s) Oral once PRN  dextrose 50% Injectable 12.5 Gram(s) IV Push once  dextrose 50% Injectable 25 Gram(s) IV Push once  dextrose 50% Injectable 25 Gram(s) IV Push once  glucagon  Injectable 1 milliGRAM(s) IntraMuscular once PRN  insulin glargine Injectable (LANTUS) 8 Unit(s) SubCutaneous at bedtime  insulin lispro (HumaLOG) corrective regimen sliding scale   SubCutaneous every 6 hours  levothyroxine Injectable 100 MICROGram(s) IV Push at bedtime        ACCESS DEVICES:  [ ] Peripheral IV  [ ] Central Venous Line	[ ] R	[ ] L	[ ] IJ	[ ] Fem	[ ] SC	Placed:   [ ] Arterial Line		[ ] R	[ ] L	[ ] Fem	[ ] Rad	[ ] Ax	Placed:   [ ] PICC:					[ ] Mediport  [ ] Urinary Catheter, Date Placed:   [ ] Necessity of urinary, arterial, and venous catheters discussed    OTHER MEDICATIONS:  artificial  tears Solution 1 Drop(s) Both EYES two times a day  chlorhexidine 0.12% Liquid 15 milliLiter(s) Oral Mucosa every 12 hours      CODE STATUS:     IMAGING: HISTORY  65y Female    24 HOUR EVENTS:  Put back on full vent support overnight. Tolerated CPAP throughout day. SBT in am     SUBJECTIVE/ROS:  [x ] Due to altered mental status/intubation, subjective information were not able to be obtained from the patient. History was obtained, to the extent possible, from review of the chart and collateral sources of information.      NEURO  Meds: No sedation   acetaminophen    Suspension .. 975 milliGRAM(s) Enteral Tube every 6 hours PRN Temp greater or equal to 38C (100.4F)    [x] Adequacy of sedation and pain control has been assessed and adjusted      RESPIRATORY  RR: 29 (04-15-20 @ 20:00) (15 - 29)  SpO2: 97% (04-15-20 @ 21:10) (94% - 100%)  Mechanical Ventilation: Mode: AC/ CMV (Assist Control/ Continuous Mandatory Ventilation), RR (machine): 26, RR (patient): 26, TV (machine): 380, FiO2: 30, PEEP: 5, ITime: 1, MAP: 9, PIP: 16  ABG - ( 15 Apr 2020 14:34 )  pH: 7.40  /  pCO2: 42    /  pO2: 140   / HCO3: 26    / Base Excess: 1.3   /  SaO2: 99          [ x] Extubation Readiness Assessed      CARDIOVASCULAR  HR: 129 (04-15-20 @ 21:10) (89 - 134)  ABP: 189/68 (04-15-20 @ 20:00) (163/60 - 189/68)  ABP(mean): 111 (04-15-20 @ 20:00) (92 - 111)    Meds: amLODIPine   Tablet 10 milliGRAM(s) Oral daily  hydrALAZINE 50 milliGRAM(s) Oral every 6 hours  hydrALAZINE Injectable 10 milliGRAM(s) IV Push every 6 hours PRN SBP>170  metoprolol tartrate 25 milliGRAM(s) Oral two times a day        GI/NUTRITION  Diet: Nepro Tube feeds  180ml Q6  Meds: famotidine Injectable 20 milliGRAM(s) IV Push daily  senna Syrup 10 milliLiter(s) Oral at bedtime      GENITOURINARY  I&O's   04-15 @ 07:01  -  04-16 @ 00:46  --------------------------------------------------------  IN:    Enteral Tube Flush: 240 mL    heparin Infusion: 96 mL    Nepro with Carb Steady: 360 mL    Solution: 50 mL  Total IN: 746 mL    OUT:  Indwelling Catheter - Urethral: 845 mL  Total OUT: 845 mL    Total NET: -99 mL      04-15    139  |  97  |  88<H>  ----------------------------<  174<H>  3.3<L>   |  23  |  4.19<H>    Ca    9.1      15 Apr 2020 15:00  Phos  6.0     04-15  Mg     2.4     04-15    TPro  5.6<L>  /  Alb  2.3<L>  /  TBili  0.4  /  DBili  0.3<H>  /  AST  246<H>  /  ALT  226<H>  /  AlkPhos  1134<H>  04-15    Meds: calcium acetate 667 milliGRAM(s) Oral two times a day with meals  dextrose 5%. 1000 milliLiter(s) IV Continuous <Continuous>  sodium bicarbonate 1300 milliGRAM(s) Oral three times a day        HEMATOLOGIC  Meds: aspirin  chewable 81 milliGRAM(s) Enteral Tube daily  heparin  Infusion 1200 Unit(s)/Hr IV Continuous <Continuous>     Heprin gtt on hold ptt 169                        7.1    13.74 )-----------( 282      ( 15 Apr 2020 15:00 )             23.0     PT/INR - ( 15 Apr 2020 17:38 )   PT: 13.1 sec;   INR: 1.14 ratio         PTT - ( 15 Apr 2020 20:55 )  PTT:194.5 sec        INFECTIOUS DISEASES  T(C): 36.8 (04-15-20 @ 20:00), Max: 37.1 (04-15-20 @ 07:00)  WBC Count: 13.74 K/uL (04-15 @ 15:00)  WBC Count: 12.47 K/uL (04-15 @ 02:23)    Recent Cultures:  Specimen Source: .Urine Catheterized, 04-13 @ 20:42; Results   <10,000 CFU/mL Normal Urogenital Becca; Gram Stain: --; Organism: --  Specimen Source: .Blood Triple Lumen BROWN, 04-13 @ 09:17; Results   No growth to date.; Gram Stain: --; Organism: --    Meds: meropenem  IVPB 500 milliGRAM(s) IV Intermittent every 24 hours  Daily  dose Vanco       ENDOCRINE  Blood Glucose  1    Meds: dextrose 40% Gel 15 Gram(s) Oral once PRN  dextrose 50% Injectable 12.5 Gram(s) IV Push once  dextrose 50% Injectable 25 Gram(s) IV Push once  dextrose 50% Injectable 25 Gram(s) IV Push once  glucagon  Injectable 1 milliGRAM(s) IntraMuscular once PRN  insulin glargine Injectable (LANTUS) 8 Unit(s) SubCutaneous at bedtime  insulin lispro (HumaLOG) corrective regimen sliding scale   SubCutaneous every 6 hours  levothyroxine Injectable 100 MICROGram(s) IV Push at bedtime        ACCESS DEVICES:  [ x] Peripheral IV  [ ] Central Venous Line	[xx ] R	[ ] L	[x] IJ	[ ] Fem	[ ] SC	Placed: 4/8  [x ] Arterial Line		[ ] R	[ x] L	[ ] Fem	[ x] Rad	[ ] Ax	  [ ] PICC:					[ ] Mediport  [x ] Urinary Catheter  [x ] Necessity of urinary, arterial, and venous catheters discussed    OTHER MEDICATIONS:  artificial  tears Solution 1 Drop(s) Both EYES two times a day  chlorhexidine 0.12% Liquid 15 milliLiter(s) Oral Mucosa every 12 hours

## 2020-04-16 NOTE — PROGRESS NOTE ADULT - SUBJECTIVE AND OBJECTIVE BOX
[facility] DIVISION OF KIDNEY DISEASES AND HYPERTENSION -- FOLLOW UP NOTE  --------------------------------------------------------------------------------  Chief Complaint:    24 hour events/subjective:        PAST HISTORY  --------------------------------------------------------------------------------  No significant changes to PMH, PSH, FHx, SHx, unless otherwise noted    ALLERGIES & MEDICATIONS  --------------------------------------------------------------------------------  Allergies    No Known Allergies    Intolerances      Standing Inpatient Medications  amLODIPine   Tablet 10 milliGRAM(s) Oral daily  artificial  tears Solution 1 Drop(s) Both EYES two times a day  aspirin  chewable 81 milliGRAM(s) Enteral Tube daily  calcium acetate 667 milliGRAM(s) Oral two times a day with meals  carvedilol 12.5 milliGRAM(s) Oral every 12 hours  chlorhexidine 0.12% Liquid 15 milliLiter(s) Oral Mucosa every 12 hours  dextrose 5%. 1000 milliLiter(s) IV Continuous <Continuous>  dextrose 50% Injectable 12.5 Gram(s) IV Push once  dextrose 50% Injectable 25 Gram(s) IV Push once  dextrose 50% Injectable 25 Gram(s) IV Push once  famotidine Injectable 20 milliGRAM(s) IV Push daily  heparin  Infusion 1200 Unit(s)/Hr IV Continuous <Continuous>  hydrALAZINE 50 milliGRAM(s) Oral every 6 hours  insulin glargine Injectable (LANTUS) 8 Unit(s) SubCutaneous two times a day  insulin lispro (HumaLOG) corrective regimen sliding scale   SubCutaneous every 6 hours  levothyroxine Injectable 100 MICROGram(s) IV Push at bedtime  meropenem  IVPB 500 milliGRAM(s) IV Intermittent every 24 hours  methylPREDNISolone sodium succinate Injectable 50 milliGRAM(s) IV Push every 8 hours  senna Syrup 10 milliLiter(s) Oral at bedtime  sodium bicarbonate 1300 milliGRAM(s) Oral three times a day    PRN Inpatient Medications  acetaminophen    Suspension .. 975 milliGRAM(s) Enteral Tube every 6 hours PRN  dextrose 40% Gel 15 Gram(s) Oral once PRN  glucagon  Injectable 1 milliGRAM(s) IntraMuscular once PRN  hydrALAZINE Injectable 10 milliGRAM(s) IV Push every 6 hours PRN      REVIEW OF SYSTEMS  --------------------------------------------------------------------------------  unable to do    VITALS/PHYSICAL EXAM  --------------------------------------------------------------------------------  T(C): 37.8 (04-16-20 @ 09:23), Max: 37.9 (04-16-20 @ 04:00)  HR: 82 (04-16-20 @ 12:15) (82 - 134)  BP: --  RR: 26 (04-16-20 @ 10:00) (15 - 29)  SpO2: 98% (04-16-20 @ 12:15) (97% - 100%)  Wt(kg): --        04-15-20 @ 07:01  -  04-16-20 @ 07:00  --------------------------------------------------------  IN: 1416 mL / OUT: 1220 mL / NET: 196 mL    04-16-20 @ 07:01  -  04-16-20 @ 13:06  --------------------------------------------------------  IN: 0 mL / OUT: 0 mL / NET: 0 mL      PE: Due to COVID-19, not done and relied on primary team exam      LABS/STUDIES  --------------------------------------------------------------------------------              7.4    13.96 >-----------<  310      [04-16-20 @ 03:08]              23.4     143  |  100  |  95  ----------------------------<  251      [04-16-20 @ 03:08]  3.6   |  20  |  4.52        Ca     8.8     [04-16-20 @ 03:08]      Mg     2.3     [04-16-20 @ 03:08]      Phos  5.0     [04-16-20 @ 03:08]    TPro  5.7  /  Alb  2.4  /  TBili  0.4  /  DBili  0.3  /  AST  265  /  ALT  228  /  AlkPhos  1254  [04-16-20 @ 03:08]    PT/INR: PT 13.4 , INR 1.17       [04-16-20 @ 01:21]  PTT: 64.7       [04-16-20 @ 11:45]    CK 27      [04-15-20 @ 15:00]        [04-16-20 @ 03:08]    Creatinine Trend:  SCr 4.52 [04-16 @ 03:08]  SCr 4.28 [04-16 @ 01:21]  SCr 4.19 [04-15 @ 15:00]  SCr 3.96 [04-15 @ 02:23]  SCr 3.71 [04-14 @ 13:23]    Urinalysis - [04-16-20 @ 11:45]      Color Yellow / Appearance Clear / SG 1.017 / pH 6.5      Gluc 500 mg/dL / Ketone Negative  / Bili Negative / Urobili Negative       Blood Negative / Protein 300 mg/dL / Leuk Est Negative / Nitrite Negative      RBC 6 / WBC 25 / Hyaline 4 / Gran  / Sq Epi  / Non Sq Epi 1 / Bacteria Negative    Urine Creatinine 36      [04-14-20 @ 13:23]  Urine Protein 354      [04-14-20 @ 13:23]    Ferritin 2292      [04-16-20 @ 04:32]  TSH 4.06      [04-14-20 @ 16:12]  Lipid: chol --, TG 82, HDL --, LDL --      [04-15-20 @ 15:00]    HBsAb <3.0      [04-10-20 @ 00:19]  HBsAg Nonreact      [04-10-20 @ 00:19]  HBcAb Nonreact      [04-10-20 @ 00:19]  HCV 0.14, Nonreact      [04-10-20 @ 00:19]    C3 Complement 104      [04-14-20 @ 15:58]  C4 Complement 15      [04-14-20 @ 15:58]

## 2020-04-16 NOTE — CONSULT NOTE ADULT - SUBJECTIVE AND OBJECTIVE BOX
Patient is a 65y old  Female who presents with a chief complaint of SOB AND HYPOXIA (2020 13:06)      HPI:    65 year old female PMH DM, HTN, HLD who presented to [facility] on 4/3/20 with one week of shortness of breath and fevers. Patient was found to be saturating at 70% at home. CXR (4/3) with bilateral patchy airspace disease. COVID19 PCR positive on 4/3. Intubated on 4/3. Started on plaquenil was maintained  --> . Patient received Tocilizumab on . Started on Solumedrol on . Given persistent high O2 requirements was briefly on Anakinra from 4/10 --> . Given fever, rise in procalcitonin and WBC count patient was started on Vancomycin and Meropenem on .      prior hospital charts reviewed [  ]  primary team notes reviewed [ x ]  other consultant notes reviewed [ x ]    PAST MEDICAL & SURGICAL HISTORY:  Type 2 diabetes mellitus  Hyperlipidemia  HTN (hypertension)      Allergies  No Known Allergies    ANTIMICROBIALS (past 90 days)  MEDICATIONS  (STANDING):  azithromycin  IVPB   250 mL/Hr IV Intermittent (20 @ 13:59)    cefTRIAXone   IVPB   100 mL/Hr IV Intermittent (20 @ 13:59)    hydroxychloroquine   400 milliGRAM(s) Oral (20 @ 12:50)   400 milliGRAM(s) Oral (20 @ 01:48)    hydroxychloroquine   200 milliGRAM(s) Oral (20 @ 12:02)   200 milliGRAM(s) Oral (20 @ 21:11)   200 milliGRAM(s) Oral (20 @ 12:04)   200 milliGRAM(s) Oral (20 @ 21:45)    meropenem  IVPB   100 mL/Hr IV Intermittent (20 @ 11:40)   100 mL/Hr IV Intermittent (04-15-20 @ 11:51)   100 mL/Hr IV Intermittent (20 @ 10:28)   100 mL/Hr IV Intermittent (20 @ 10:03)    vancomycin  IVPB   250 mL/Hr IV Intermittent (20 @ 09:34)    vancomycin  IVPB   250 mL/Hr IV Intermittent (20 @ 04:42)    vancomycin  IVPB   100 mL/Hr IV Intermittent (20 @ 10:28)    vancomycin  IVPB   100 mL/Hr IV Intermittent (20 @ 13:31)      ANTIMICROBIALS:    meropenem  IVPB 500 every 24 hours    OTHER MEDS: MEDICATIONS  (STANDING):  acetaminophen    Suspension .. 975 every 6 hours PRN  amLODIPine   Tablet 10 daily  carvedilol 12.5 every 12 hours  dextrose 40% Gel 15 once PRN  dextrose 50% Injectable 12.5 once  dextrose 50% Injectable 25 once  dextrose 50% Injectable 25 once  famotidine Injectable 20 daily  glucagon  Injectable 1 once PRN  hydrALAZINE 50 every 6 hours  hydrALAZINE Injectable 10 every 6 hours PRN  insulin glargine Injectable (LANTUS) 8 two times a day  insulin lispro (HumaLOG) corrective regimen sliding scale  every 6 hours  levothyroxine Injectable 100 at bedtime  methylPREDNISolone sodium succinate Injectable 50 every 8 hours  senna Syrup 10 at bedtime    SOCIAL HISTORY:  unable to obtain as patient is intubated and sedated.     FAMILY HISTORY: unable to obtain as patient is intubated and sedated.     REVIEW OF SYSTEMS  [ x ] ROS unobtainable because:  unable to obtain as patient is intubated and sedated.   [  ] All other systems negative except as noted below:	    Constitutional:  [ ] fever [ ] chills  [ ] weight loss  [ ] weakness  Skin:  [ ] rash [ ] phlebitis	  Eyes: [ ] icterus [ ] pain  [ ] discharge	  ENMT: [ ] sore throat  [ ] thrush [ ] ulcers [ ] exudates  Respiratory: [ ] dyspnea [ ] hemoptysis [ ] cough [ ] sputum	  Cardiovascular:  [ ] chest pain [ ] palpitations [ ] edema	  Gastrointestinal:  [ ] nausea [ ] vomiting [ ] diarrhea [ ] constipation [ ] pain	  Genitourinary:  [ ] dysuria [ ] frequency [ ] hematuria [ ] discharge [ ] flank pain  [ ] incontinence  Musculoskeletal:  [ ] myalgias [ ] arthralgias [ ] arthritis  [ ] back pain  Neurological:  [ ] headache [ ] seizures  [ ] confusion/altered mental status  Psychiatric:  [ ] anxiety [ ] depression	  Hematology/Lymphatics:  [ ] lymphadenopathy  Endocrine:  [ ] adrenal [ ] thyroid  Allergic/Immunologic:	 [ ] transplant [ ] seasonal    Vital Signs Last 24 Hrs  T(F): 97.4 (20 @ 19:00), Max: 104 (04-10-20 @ 00:00)  Vital Signs Last 24 Hrs  HR: 75 (20 @ 20:00) (75 - 120)  BP: --  RR: 26 (20 @ 20:00)  SpO2: 100% (20 @ 20:00) (97% - 100%)  Wt(kg): --    PHYSICAL EXAMINATION:  General: Intubated and Sedated  HEENT: +ETT  Neck: Supple  Cardiac: RRR, No M/R/G  Resp: CTAB, No Wh/Rh/Ra  Abdomen: NBS, NT/ND, No HSM, No rigidity or guarding  MSK: No LE edema. No Calf tenderness  : Glass  Skin: No rashes or lesions. Skin is warm and dry to the touch.   Vascular: RIJ Shiley catheter (No surrounding erythema, drainage or tenderness to palpation)  Neuro: Intubated and Sedated, LUE Radial Line (No surrounding erythema, drainage or tenderness to palpation)  Psych: Unable to assess - intubated and sedated                        6.3    10.57 )-----------( 241      ( 2020 20:14 )             20.8     -    139  |  98  |  96<H>  ----------------------------<  242<H>  3.3<L>   |  25  |  4.39<H>    Ca    8.3<L>      2020 15:51  Phos  4.4       Mg     2.3         TPro  5.2<L>  /  Alb  2.2<L>  /  TBili  0.4  /  DBili  x   /  AST  213<H>  /  ALT  207<H>  /  AlkPhos  1023<H>      Urinalysis Basic - ( 2020 11:45 )    Color: Yellow / Appearance: Clear / S.017 / pH: x  Gluc: x / Ketone: Negative  / Bili: Negative / Urobili: Negative   Blood: x / Protein: 300 mg/dL / Nitrite: Negative   Leuk Esterase: Negative / RBC: 6 /hpf / WBC 25 /HPF   Sq Epi: x / Non Sq Epi: 1 /hpf / Bacteria: Negative    C-Reactive Protein, Serum (.20 @ 16:10)    C-Reactive Protein, Serum: 2.06 mg/dL    C-Reactive Protein, Serum (.20 @ 08:36)    C-Reactive Protein, Serum: 2.28 mg/dL    C-Reactive Protein, Serum (.16.20 @ 04:32)    C-Reactive Protein, Serum: 1.26 mg/dL    Ferritin, Serum (12.20 @ 16:10)    Ferritin, Serum: 1183 ng/mL    Ferritin, Serum in AM (..20 @ 08:36)    Ferritin, Serum: 1516 ng/mL    Ferritin, Serum (.16.20 @ 04:32)    Ferritin, Serum: 2292: Test Repeated ng/mL    D-Dimer Assay, Quantitative (.20 @ 06:36)    D-Dimer Assay, Quantitative: 1582    D-Dimer Assay, Quantitative (.15.20 @ 15:00)    D-Dimer Assay, Quantitative: 1058    Procalcitonin, Serum (.15.20 @ 15:00)    Procalcitonin, Serum: 0.66    Procalcitonin, Serum (.16.20 @ 01:21)    Procalcitonin, Serum: 0.60    MICROBIOLOGY:    Vancomycin Level, Random: 13.4 (04-16 @ 03:08)  Vancomycin Level, Random: 14.2 (04-16 @ 01:21)  Vancomycin Level, Random: 7.5 (04-14 @ 01:54)  Vancomycin Level, Random: <4.0 (04-12 @ 01:11)    Culture - Urine (collected 2020 20:42)  Source: .Urine Catheterized  Final Report (2020 15:41):    <10,000 CFU/mL Normal Urogenital Becca    Culture - Blood (collected 2020 09:17)  Source: .Blood Triple Lumen BROWN  Preliminary Report (2020 10:01):    No growth to date.    RADIOLOGY:    <The imaging below has been reviewed and visualized by me independently. Findings as detailed in report below>    EXAM:  XR CHEST PORTABLE URGENT 1V                        PROCEDURE DATE:  2020    The heart is enlarged. Diffuse airspace opacities are seen throughout both lungs especially in both lower lobes which appears to be slightly worsened when compared to previous study done 2020. Endotracheal tube is in good position. Shiley catheter is seen in the right and the tip is in superior vena cava. No pneumothorax.

## 2020-04-16 NOTE — PROGRESS NOTE ADULT - ASSESSMENT
65y female with PMHx of T2DM, HTN, HLD, hx of CKD   hypothyroidism presents with SOB. COVID-19 positive        Neuro   - precedex off    CV  -Trops trending up cont to monitor   - Hydralazine 50mg V1qmpyy and norvasc 10mg OGT with holding parameter  - HTN/tachycardia- metoprolol 25 q 12   - hydralazine 10 mg IV q6 PRN MAP>100    Pulmonary  - AC/VC 20/380/5/30%  - ABG on CPAP 10/5: 7.37/44/145/25  - tolerated  CPAP today into evening     GI  - Nepro bolus feed 180ml Q 6 hours    - pepcid 20mg IV QD  - -218 mg/dl   - Last BM on 4/15, lactulose added      - Will receive HD today 4/15 as per renal   - Phoslo decreased to 667mg BID    Heme   -h/h 7.1/23  - started heparin gtt for Covid hypercoaguable state, PTT goal 50-70  -Heprin gtt held @ 8pm Ptt 156-->196  will recheck 2 with am labs    ID  - vanco level 7.5 this am, got 1g Vanco, will re-check vanco level in am (if vanco level <15, can redose 1g x1)  - continue meropenum for empiric line sepsis  - 4/13 BCx - neg to date and Urine culture shows normal beka  - check inflammatory markers daily  - s/p anakinra (4/10-4/13)  - 4/8 solumedrol 50mg IV q 8hrs (until 4/11)   - 4/7 ACTEMRA  400mg IVPB x 1 given (d/w Dr Foote ID)   - completed plaquenil   - repeat COVID test sent 4/13 - still positive    ENDO  - BG improved, will continue lantus 8u QHS  - continue HISS  - IV synthroid      Lines   - R IJ shiley (4/8), L  IJ  triple lumen (4/3-4/13),   L radial A-line 65y female with PMHx of T2DM, HTN, HLD, hx of CKD   hypothyroidism presents with SOB. COVID-19 positive        Neuro   - precedex off  - Monitor for mental status       CV  - Required IVP hydralazine for elevated BP over 200  - Change lopressr to coreg 12.5mg BID  - Hydralazine 50mg W2hnlrf and norvasc 10mg OGT with holding parameter  - hydralazine 10 mg IV q6 PRN MAP>100    Pulmonary  - AC/VC 26/380/5/30%  - AB.39/40/143/24/ 99%   - Repeat CPAP trial  today     GI  - Nepro bolus feed 180ml Q 6 hours    - pepcid 20mg IV QD  - -218 mg/dl   - Last BM on 4/15, lactulose added      - Making urine   - Will hold HD today as per renal   - Phoslo decreased to 667mg BID    Heme   -h/h 7.4/23.4  - started heparin gtt for Covid hypercoaguable state, PTT goal 50-70       ID  - [facility] and UA  - vanco level 7.5  1G gaiven over night f/u Level for daily dosing   - continue meropenum for empiric line sepsis  - f/u BCx - neg to date and Urine culture shows normal beka  - check inflammatory markers daily  - s/p anakinra (4/10-)  -  solumedrol 50mg IV q 8hrs (until )  restart for 5 more day   -  ACTEMRA  400mg IVPB x 1 given (d/w Dr Foote ID)   - completed plaquenil   - repeat COVID test sent  - still positive    ENDO  - BG in the 200, will increase lantus 8u BID  - continue HISS  - IV synthroid      Lines   - R IJ shiley (), L radial A-line  - L  IJ  triple lumen (4/3-) 65y female with PMHx of T2DM, HTN, HLD, hx of CKD   hypothyroidism presents with SOB. COVID-19 positive        Neuro   - precedex off  - Monitor for mental status   - Blinking eyes on command today      CV  - Required IVP hydralazine for elevated BP over 200  - Change lopressr to coreg 12.5mg BID for better BP control  - Hydralazine 50mg F9xptms and norvasc 10mg OGT with holding parameter  - hydralazine 10 mg IV q6 PRN MAP>100    Pulmonary  - AC/VC 26/380/5/30%  - AB.39/40/143/24/ 99%   - Repeat CPAP trial  today     GI  - Nepro bolus feed 180ml Q 6 hours    - pepcid 20mg IV QD  - -218 mg/dl   - Last BM on 4/15, lactulose added      - Making urine   - Will hold HD today as per renal   - Phoslo decreased to 667mg BID    Heme   -h/h 7.4/23.4  - started heparin gtt for Covid hypercoaguable state, PTT goal 50-70       ID  - Febrile send blood from [facility] and pripheral urine culture and UA  - vanco level 7.5  1G gaiven over night f/u Level for daily dosing   - continue meropenum for empiric line sepsis  - f/u BCx - neg to date and Urine culture shows normal beka  - check inflammatory markers daily  - s/p anakinra (4/10-)  -  solumedrol 50mg IV q 8hrs (until )  restart for 5 more day   -  ACTEMRA  400mg IVPB x 1 given (d/w Dr Foote ID)   - completed plaquenil   - repeat COVID test sent  - still positive    ENDO  - BG in the 200, will increase lantus 8u BID  - continue HISS  - IV synthroid      Lines   - R IJ shiley (), L radial A-line  - L  IJ  triple lumen (4/3-)

## 2020-04-16 NOTE — PROGRESS NOTE ADULT - ATTENDING COMMENTS
I personally saw and examined the patient in detail.  I have spoken to the above provider regarding the assessment and plan of care.  I reviewed the above assessment and plan of care, and agree.  I have made changes in the body of the note where appropriate.  Patient following simple commands today.  Keep off of sedation.  Metoprolol changed to carvedilol.  Restart steroids.  Increase Lantus.

## 2020-04-16 NOTE — CONSULT NOTE ADULT - ASSESSMENT
65 year old female PMH DM, HTN, HLD who presented to [facility] on 4/3/20 with one week of shortness of breath and fevers.     COVID19 PCR positive on 4/3.   Plaquenil: 4/4 --> 4/6  Solumedrol: 4/8 --> 4/11, 4/16 -->  Tocilizumab: 4/7  Anakinra (BID): 4/10 --> 4/13    Given fever, rise in procalcitonin and WBC count patient was started on Vancomycin and Meropenem on 4/13.   At this point given relatively low procalcitonin level and negative BCx suspicion is relatively low  As patient is more than FPC through treatment course for VAP reasonable to complete 7 days of treatment  Repeat blood cultures sent today; I would followup on the result    Patient also restarted on corticosteroids today  Especially given prior treatment with Actemra and Anakinra I would discontinue corticosteroids  Steroids are of unclear benefit in COVID19    Ceftriaxone: 4/3  Azithromycin: 4/3  Vancomycin: 4/13 -->  Meropenem: 4/13 -->      #Fever, Leukocytosis, Abnormal Lab Work (Elevated Procalcitonin)  --Continue Meropenem 500 mg IV Q24H (End Date: 4/19)  --Continue Vancomycin 500 mg AD  --Continue to follow CBC with diff  --Continue to follow temperature curve  --Follow up on preliminary blood cultures  --Followup on preliminary urine cultures    #COVID19 Pneumonia, Hypoxic Respiratory Failure, Fever, Lymphopenia, Transaminitis, Elevated ESR/CRP  --Recommend discontinuing Corticosteroids  --Maintain COVID19 Isolation Precautions  --If change in clinical status or O2 requirement increase would check new Procalcitonin, Ferritin, CRP, D-Dimer (limit to at most Q48H)   --Continue to follow CBC with diff  --Continue to follow BUN/Cr/eGFR  --Continue to follow transaminases  --Continue to follow temperature curve    I will continue to follow. Please feel free to contact me with any further questions.    Earl Rangel M.D.  [facility] Division of Infectious Disease  8AM-5PM: Pager Number [phone]  After Hours (or if no response): Please contact the Infectious Diseases Office at [phone]     The above assessment and plan were discussed with PICU NP Angelo

## 2020-04-16 NOTE — PROGRESS NOTE ADULT - ASSESSMENT
65F PMHx DM2, HTN, HLD who present to ED for fever/dyspnea x 1 week.  Per HPI, pt desaturated at home O2 sat 70% which improved nasal cannula (another family member).  In ED hypoxia 70s on nasal cannula s/p intubation.  Covid positive with CXR b/l opacities. Given ceftriaxone, azithromycin, hydroxychlorquine.     Nephrology consulted for MARIO or MARIO on CKD.       MARIO/ Proteinuria    in setting septic shock Covid19 infection, low BP, also noted to have nephrotic range proteinuria  - s/p HD on 4/13, has stable labs since then, non oliguroic. 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 IVIG might have some role. If possible, can a renal bx be done at bedside by US or IR?- this would aid in perhaps treatment of this. Other serological workup was sent and pending.  - Monitor labs and urine output. Avoid NSAIDs, ACEI/ARBS, RCA and nephrotoxins. Dose vancomycin per level.      Metabolic acidosis   in the setting of MARIO   continue oral bicarbonate    Hyperphosphatemia  in the setting of MARIO   continue Ca acetate  Monitor serum phosphorus    Hypocalcemia   in the setting of hyperphosphatemia and hypoalbuminemia  - continue calcium acetate  - monitor ionized calcium, replete to keep >1.1

## 2020-04-17 LAB
4/8 RATIO: 1.7 RATIO — SIGNIFICANT CHANGE UP (ref 0.9–3.6)
ABS CD8: 99 /UL — LOW (ref 142–740)
ALBUMIN SERPL ELPH-MCNC: 2.3 G/DL — LOW (ref 3.3–5)
ALBUMIN SERPL ELPH-MCNC: 2.3 G/DL — LOW (ref 3.3–5)
ALP SERPL-CCNC: 1131 U/L — HIGH (ref 40–120)
ALP SERPL-CCNC: 881 U/L — HIGH (ref 40–120)
ALT FLD-CCNC: 180 U/L — HIGH (ref 10–45)
ALT FLD-CCNC: 242 U/L — HIGH (ref 10–45)
ANION GAP SERPL CALC-SCNC: 17 MMOL/L — SIGNIFICANT CHANGE UP (ref 5–17)
ANION GAP SERPL CALC-SCNC: 18 MMOL/L — HIGH (ref 5–17)
APTT BLD: 52.7 SEC — HIGH (ref 27.5–36.3)
AST SERPL-CCNC: 145 U/L — HIGH (ref 10–40)
AST SERPL-CCNC: 303 U/L — HIGH (ref 10–40)
BILIRUB SERPL-MCNC: 0.4 MG/DL — SIGNIFICANT CHANGE UP (ref 0.2–1.2)
BILIRUB SERPL-MCNC: 0.6 MG/DL — SIGNIFICANT CHANGE UP (ref 0.2–1.2)
BUN SERPL-MCNC: 101 MG/DL — HIGH (ref 7–23)
BUN SERPL-MCNC: 99 MG/DL — HIGH (ref 7–23)
CALCIUM SERPL-MCNC: 8.6 MG/DL — SIGNIFICANT CHANGE UP (ref 8.4–10.5)
CALCIUM SERPL-MCNC: 9 MG/DL — SIGNIFICANT CHANGE UP (ref 8.4–10.5)
CD3 BLASTS SPEC-ACNC: 274 /UL — LOW (ref 672–1870)
CD3 BLASTS SPEC-ACNC: 62 % — SIGNIFICANT CHANGE UP (ref 59–83)
CD4 %: 38 % — SIGNIFICANT CHANGE UP (ref 30–62)
CD8 %: 23 % — SIGNIFICANT CHANGE UP (ref 12–36)
CHLORIDE SERPL-SCNC: 98 MMOL/L — SIGNIFICANT CHANGE UP (ref 96–108)
CHLORIDE SERPL-SCNC: 98 MMOL/L — SIGNIFICANT CHANGE UP (ref 96–108)
CK SERPL-CCNC: 25 U/L — SIGNIFICANT CHANGE UP (ref 25–170)
CO2 SERPL-SCNC: 24 MMOL/L — SIGNIFICANT CHANGE UP (ref 22–31)
CO2 SERPL-SCNC: 25 MMOL/L — SIGNIFICANT CHANGE UP (ref 22–31)
CREAT SERPL-MCNC: 4.31 MG/DL — HIGH (ref 0.5–1.3)
CREAT SERPL-MCNC: 4.35 MG/DL — HIGH (ref 0.5–1.3)
CRP SERPL-MCNC: 0.76 MG/DL — HIGH (ref 0–0.4)
CULTURE RESULTS: NO GROWTH — SIGNIFICANT CHANGE UP
D DIMER BLD IA.RAPID-MCNC: 999 NG/ML DDU — HIGH
GAS PNL BLDA: SIGNIFICANT CHANGE UP
GAS PNL BLDA: SIGNIFICANT CHANGE UP
GLUCOSE BLDC GLUCOMTR-MCNC: 165 MG/DL — HIGH (ref 70–99)
GLUCOSE BLDC GLUCOMTR-MCNC: 205 MG/DL — HIGH (ref 70–99)
GLUCOSE BLDC GLUCOMTR-MCNC: 229 MG/DL — HIGH (ref 70–99)
GLUCOSE BLDC GLUCOMTR-MCNC: 245 MG/DL — HIGH (ref 70–99)
GLUCOSE SERPL-MCNC: 187 MG/DL — HIGH (ref 70–99)
GLUCOSE SERPL-MCNC: 205 MG/DL — HIGH (ref 70–99)
HCT VFR BLD CALC: 23.9 % — LOW (ref 34.5–45)
HCT VFR BLD CALC: 25.2 % — LOW (ref 34.5–45)
HGB BLD-MCNC: 7.4 G/DL — LOW (ref 11.5–15.5)
HGB BLD-MCNC: 8.1 G/DL — LOW (ref 11.5–15.5)
INR BLD: 1.03 RATIO — SIGNIFICANT CHANGE UP (ref 0.88–1.16)
LDH SERPL L TO P-CCNC: 421 U/L — HIGH (ref 50–242)
MAGNESIUM SERPL-MCNC: 2.3 MG/DL — SIGNIFICANT CHANGE UP (ref 1.6–2.6)
MCHC RBC-ENTMCNC: 26.1 PG — LOW (ref 27–34)
MCHC RBC-ENTMCNC: 26.3 PG — LOW (ref 27–34)
MCHC RBC-ENTMCNC: 31 GM/DL — LOW (ref 32–36)
MCHC RBC-ENTMCNC: 32.1 GM/DL — SIGNIFICANT CHANGE UP (ref 32–36)
MCV RBC AUTO: 81.8 FL — SIGNIFICANT CHANGE UP (ref 80–100)
MCV RBC AUTO: 84.5 FL — SIGNIFICANT CHANGE UP (ref 80–100)
NRBC # BLD: 0 /100 WBCS — SIGNIFICANT CHANGE UP (ref 0–0)
NRBC # BLD: 0 /100 WBCS — SIGNIFICANT CHANGE UP (ref 0–0)
PHOSPHATE SERPL-MCNC: 4 MG/DL — SIGNIFICANT CHANGE UP (ref 2.5–4.5)
PLATELET # BLD AUTO: 221 K/UL — SIGNIFICANT CHANGE UP (ref 150–400)
PLATELET # BLD AUTO: 260 K/UL — SIGNIFICANT CHANGE UP (ref 150–400)
POTASSIUM SERPL-MCNC: 3.4 MMOL/L — LOW (ref 3.5–5.3)
POTASSIUM SERPL-MCNC: 3.5 MMOL/L — SIGNIFICANT CHANGE UP (ref 3.5–5.3)
POTASSIUM SERPL-SCNC: 3.4 MMOL/L — LOW (ref 3.5–5.3)
POTASSIUM SERPL-SCNC: 3.5 MMOL/L — SIGNIFICANT CHANGE UP (ref 3.5–5.3)
PROT SERPL-MCNC: 5.4 G/DL — LOW (ref 6–8.3)
PROT SERPL-MCNC: 5.7 G/DL — LOW (ref 6–8.3)
PROTHROM AB SERPL-ACNC: 11.9 SEC — SIGNIFICANT CHANGE UP (ref 10–12.9)
RBC # BLD: 2.83 M/UL — LOW (ref 3.8–5.2)
RBC # BLD: 3.08 M/UL — LOW (ref 3.8–5.2)
RBC # FLD: 14.8 % — HIGH (ref 10.3–14.5)
RBC # FLD: 14.8 % — HIGH (ref 10.3–14.5)
SODIUM SERPL-SCNC: 140 MMOL/L — SIGNIFICANT CHANGE UP (ref 135–145)
SODIUM SERPL-SCNC: 140 MMOL/L — SIGNIFICANT CHANGE UP (ref 135–145)
SPECIMEN SOURCE: SIGNIFICANT CHANGE UP
T-CELL CD4 SUBSET PNL BLD: 169 /UL — LOW (ref 489–1457)
TRIGL SERPL-MCNC: 129 MG/DL — SIGNIFICANT CHANGE UP (ref 10–149)
TROPONIN T, HIGH SENSITIVITY RESULT: 93 NG/L — HIGH (ref 0–51)
URATE SERPL-MCNC: 7 MG/DL — SIGNIFICANT CHANGE UP (ref 2.5–7)
VANCOMYCIN FLD-MCNC: 12.9 UG/ML — SIGNIFICANT CHANGE UP
WBC # BLD: 12.33 K/UL — HIGH (ref 3.8–10.5)
WBC # BLD: 7.37 K/UL — SIGNIFICANT CHANGE UP (ref 3.8–10.5)
WBC # FLD AUTO: 12.33 K/UL — HIGH (ref 3.8–10.5)
WBC # FLD AUTO: 7.37 K/UL — SIGNIFICANT CHANGE UP (ref 3.8–10.5)

## 2020-04-17 PROCEDURE — 71045 X-RAY EXAM CHEST 1 VIEW: CPT | Mod: 26

## 2020-04-17 PROCEDURE — 99232 SBSQ HOSP IP/OBS MODERATE 35: CPT

## 2020-04-17 PROCEDURE — 99291 CRITICAL CARE FIRST HOUR: CPT

## 2020-04-17 PROCEDURE — 99292 CRITICAL CARE ADDL 30 MIN: CPT

## 2020-04-17 RX ORDER — HYDRALAZINE HCL 50 MG
10 TABLET ORAL ONCE
Refills: 0 | Status: COMPLETED | OUTPATIENT
Start: 2020-04-17 | End: 2020-04-17

## 2020-04-17 RX ORDER — CARVEDILOL PHOSPHATE 80 MG/1
25 CAPSULE, EXTENDED RELEASE ORAL EVERY 12 HOURS
Refills: 0 | Status: DISCONTINUED | OUTPATIENT
Start: 2020-04-17 | End: 2020-04-19

## 2020-04-17 RX ORDER — DEXMEDETOMIDINE HYDROCHLORIDE IN 0.9% SODIUM CHLORIDE 4 UG/ML
0.1 INJECTION INTRAVENOUS
Qty: 200 | Refills: 0 | Status: DISCONTINUED | OUTPATIENT
Start: 2020-04-17 | End: 2020-04-18

## 2020-04-17 RX ORDER — POTASSIUM CHLORIDE 20 MEQ
20 PACKET (EA) ORAL ONCE
Refills: 0 | Status: COMPLETED | OUTPATIENT
Start: 2020-04-17 | End: 2020-04-17

## 2020-04-17 RX ORDER — VANCOMYCIN HCL 1 G
500 VIAL (EA) INTRAVENOUS ONCE
Refills: 0 | Status: COMPLETED | OUTPATIENT
Start: 2020-04-17 | End: 2020-04-17

## 2020-04-17 RX ADMIN — CHLORHEXIDINE GLUCONATE 15 MILLILITER(S): 213 SOLUTION TOPICAL at 04:51

## 2020-04-17 RX ADMIN — Medication 1 DROP(S): at 04:50

## 2020-04-17 RX ADMIN — Medication 1 DROP(S): at 17:57

## 2020-04-17 RX ADMIN — FAMOTIDINE 20 MILLIGRAM(S): 10 INJECTION INTRAVENOUS at 11:00

## 2020-04-17 RX ADMIN — Medication 10 MILLIGRAM(S): at 09:30

## 2020-04-17 RX ADMIN — CARVEDILOL PHOSPHATE 25 MILLIGRAM(S): 80 CAPSULE, EXTENDED RELEASE ORAL at 17:58

## 2020-04-17 RX ADMIN — Medication 50 MILLIGRAM(S): at 04:51

## 2020-04-17 RX ADMIN — Medication 4: at 17:58

## 2020-04-17 RX ADMIN — Medication 50 MILLIGRAM(S): at 22:01

## 2020-04-17 RX ADMIN — CARVEDILOL PHOSPHATE 12.5 MILLIGRAM(S): 80 CAPSULE, EXTENDED RELEASE ORAL at 04:51

## 2020-04-17 RX ADMIN — AMLODIPINE BESYLATE 10 MILLIGRAM(S): 2.5 TABLET ORAL at 04:50

## 2020-04-17 RX ADMIN — Medication 4: at 22:09

## 2020-04-17 RX ADMIN — MEROPENEM 100 MILLIGRAM(S): 1 INJECTION INTRAVENOUS at 11:11

## 2020-04-17 RX ADMIN — INSULIN GLARGINE 8 UNIT(S): 100 INJECTION, SOLUTION SUBCUTANEOUS at 04:51

## 2020-04-17 RX ADMIN — Medication 50 MILLIGRAM(S): at 14:13

## 2020-04-17 RX ADMIN — Medication 2: at 14:13

## 2020-04-17 RX ADMIN — Medication 1300 MILLIGRAM(S): at 04:53

## 2020-04-17 RX ADMIN — Medication 4: at 04:51

## 2020-04-17 RX ADMIN — Medication 100 MICROGRAM(S): at 22:00

## 2020-04-17 RX ADMIN — Medication 50 MILLIGRAM(S): at 17:58

## 2020-04-17 RX ADMIN — Medication 50 MILLIGRAM(S): at 04:52

## 2020-04-17 RX ADMIN — CHLORHEXIDINE GLUCONATE 15 MILLILITER(S): 213 SOLUTION TOPICAL at 17:58

## 2020-04-17 RX ADMIN — Medication 1300 MILLIGRAM(S): at 15:32

## 2020-04-17 RX ADMIN — Medication 20 MILLIEQUIVALENT(S): at 11:00

## 2020-04-17 RX ADMIN — Medication 1300 MILLIGRAM(S): at 22:01

## 2020-04-17 RX ADMIN — DEXMEDETOMIDINE HYDROCHLORIDE IN 0.9% SODIUM CHLORIDE 2.5 MICROGRAM(S)/KG/HR: 4 INJECTION INTRAVENOUS at 12:00

## 2020-04-17 RX ADMIN — Medication 0.1 MILLIGRAM(S): at 15:32

## 2020-04-17 RX ADMIN — Medication 10 MILLIGRAM(S): at 14:13

## 2020-04-17 RX ADMIN — INSULIN GLARGINE 8 UNIT(S): 100 INJECTION, SOLUTION SUBCUTANEOUS at 22:01

## 2020-04-17 RX ADMIN — Medication 100 MILLIGRAM(S): at 22:00

## 2020-04-17 NOTE — PROGRESS NOTE ADULT - SUBJECTIVE AND OBJECTIVE BOX
[facility] DIVISION OF KIDNEY DISEASES AND HYPERTENSION -- FOLLOW UP NOTE  --------------------------------------------------------------------------------  Chief Complaint: MARIO    24 hour events/subjective: : No acute events overnight. Labs, vitals, and medications reviewed. 30%/PEEP 5. Labs grossly stable    PAST HISTORY  --------------------------------------------------------------------------------  No significant changes to PMH, PSH, FHx, SHx, unless otherwise noted    ALLERGIES & MEDICATIONS  --------------------------------------------------------------------------------  Allergies    No Known Allergies    Intolerances      Standing Inpatient Medications  amLODIPine   Tablet 10 milliGRAM(s) Oral daily  artificial  tears Solution 1 Drop(s) Both EYES two times a day  carvedilol 25 milliGRAM(s) Oral every 12 hours  chlorhexidine 0.12% Liquid 15 milliLiter(s) Oral Mucosa every 12 hours  dexMEDEtomidine Infusion 0.1 MICROgram(s)/kG/Hr IV Continuous <Continuous>  dextrose 5%. 1000 milliLiter(s) IV Continuous <Continuous>  dextrose 50% Injectable 12.5 Gram(s) IV Push once  dextrose 50% Injectable 25 Gram(s) IV Push once  dextrose 50% Injectable 25 Gram(s) IV Push once  famotidine Injectable 20 milliGRAM(s) IV Push daily  hydrALAZINE 50 milliGRAM(s) Oral every 6 hours  insulin glargine Injectable (LANTUS) 8 Unit(s) SubCutaneous two times a day  insulin lispro (HumaLOG) corrective regimen sliding scale   SubCutaneous every 6 hours  levothyroxine Injectable 100 MICROGram(s) IV Push at bedtime  meropenem  IVPB 500 milliGRAM(s) IV Intermittent every 24 hours  potassium chloride   Solution 20 milliEquivalent(s) Enteral Tube once  senna Syrup 10 milliLiter(s) Oral at bedtime  sodium bicarbonate 1300 milliGRAM(s) Oral three times a day    REVIEW OF SYSTEMS  --------------------------------------------------------------------------------  Unable to obtain    VITALS/PHYSICAL EXAM  --------------------------------------------------------------------------------  T(C): 36.4 (04-17-20 @ 03:00), Max: 37.1 (04-16-20 @ 16:00)  HR: 90 (04-17-20 @ 09:48) (67 - 90)  BP: --  RR: 26 (04-17-20 @ 07:00) (15 - 26)  SpO2: 99% (04-17-20 @ 09:48) (97% - 100%)  Wt(kg): --    04-16-20 @ 07:01  -  04-17-20 @ 07:00  --------------------------------------------------------  IN: 1088.1 mL / OUT: 2170 mL / NET: -1081.9 mL    Physical Exam: Deferred due to COVID-19 positivity, and preservation of PPE.    LABS/STUDIES  --------------------------------------------------------------------------------              8.1    12.33 >-----------<  260      [04-17-20 @ 03:12]              25.2     140  |  98  |  99  ----------------------------<  187      [04-17-20 @ 03:12]  3.4   |  24  |  4.35        Ca     8.6     [04-17-20 @ 03:12]      Mg     2.3     [04-17-20 @ 03:12]      Phos  4.0     [04-17-20 @ 03:12]    TPro  5.7  /  Alb  2.3  /  TBili  0.6  /  DBili  x   /  AST  303  /  ALT  242  /  AlkPhos  1131  [04-17-20 @ 03:12]    PT/INR: PT 11.9 , INR 1.03       [04-17-20 @ 03:12]  PTT: 52.7       [04-17-20 @ 03:12]    Uric acid 7.0      [04-17-20 @ 03:14]  CK 25      [04-17-20 @ 03:12]        [04-17-20 @ 03:12]    Creatinine Trend:  SCr 4.35 [04-17 @ 03:12]  SCr 4.39 [04-16 @ 15:51]  SCr 4.52 [04-16 @ 03:08]  SCr 4.28 [04-16 @ 01:21]  SCr 4.19 [04-15 @ 15:00]

## 2020-04-17 NOTE — PROGRESS NOTE ADULT - ATTENDING COMMENTS
The patient was personally seen and examined, in addition to being examined and evaluated by NP.  All elements of the note were edited where appropriate.    -there is no evidence of acute ischemia.  -there is no evidence of significant arrhythmia, remains sr  -there is no evidence for meaningful  volume overload.  -weaning from vent today and tolerating pressure support well  -remains globally weak and though follows simple commands and is tolerating ps, she is not ready to be extubated  -has been hypertensive and have tried iv hydralazine with modest response and clonidine, to be administered  -dc steroids as not needed from id or renal perspective  -non-oliguric renal failure, s/p hd 4/13, no urgent need for hd at this time  -planning for renal bx next week to better assess nature of the renal failure  -febrile cultures sent, cont abx    -DVT prophylaxis  -monitor electrolytes, keep k>4, Mg>2  -cardiovascular status is otherwise without acute change. The patient was personally seen and examined, in addition to being examined and evaluated by NP.  All elements of the note were edited where appropriate.    -there is no evidence of acute ischemia.  -there is no evidence of significant arrhythmia, remains sr  -there is no evidence for meaningful  volume overload.  -weaning from vent today and tolerating pressure support well  -remains globally weak and though follows simple commands and is tolerating ps, she is not ready to be extubated  -has been hypertensive and have tried iv hydralazine with modest response and clonidine, to be administered  -dc steroids as not needed from id or renal perspective  -non-oliguric renal failure, s/p hd 4/13, no urgent need for hd at this time  -planning for renal bx next week to better assess nature of the renal failure  -febrile cultures sent, cont abx  -anemia s/p iu prbc, AC on hold, to recheck cbc and resume ac if able

## 2020-04-17 NOTE — PROGRESS NOTE ADULT - ASSESSMENT
65 year old female PMH DM, HTN, HLD who presented to [facility] on 4/3/20 with one week of shortness of breath and fevers.     COVID19 PCR positive on 4/3.   Plaquenil: 4/4 --> 4/6  Solumedrol: 4/8 --> 4/11, 4/16 --> 4/17  Tocilizumab: 4/7  Anakinra (BID): 4/10 --> 4/13    Given fever, rise in procalcitonin and WBC count patient was started on Vancomycin and Meropenem on 4/13.   At this point given relatively low procalcitonin level and negative BCx suspicion is relatively low  As patient is more than long term through treatment course for VAP reasonable to complete 7 days of treatment  Repeat blood cultures sent today; I would followup on the result    Patient also restarted on corticosteroids today  Especially given prior treatment with Actemra and Anakinra I would discontinue corticosteroids  Steroids are of unclear benefit in COVID19    Ceftriaxone: 4/3  Azithromycin: 4/3  Vancomycin: 4/13 -->  Meropenem: 4/13 -->      #Fever, Leukocytosis, Abnormal Lab Work (Elevated Procalcitonin)  --Continue Meropenem 500 mg IV Q24H (End Date: 4/19)  --Continue Vancomycin 500 mg AD (End Date: 4/19)  --Continue to follow CBC with diff  --Continue to follow temperature curve  --Follow up on preliminary blood cultures  --Followup on preliminary urine cultures    #COVID19 Pneumonia, Hypoxic Respiratory Failure, Fever, Lymphopenia, Transaminitis, Elevated ESR/CRP  --Maintain COVID19 Isolation Precautions  --If change in clinical status or O2 requirement increase would check new Procalcitonin, Ferritin, CRP, D-Dimer (limit to at most Q48H)   --Continue to follow CBC with diff  --Continue to follow BUN/Cr/eGFR  --Continue to follow transaminases  --Continue to follow temperature curve    I will be away over this upcoming weekend. Please contact the Infectious Diseases Office with any further questions or concerns.     Earl Rangel M.D.  [facility] Division of Infectious Disease  8AM-5PM: Pager Number [phone]  After Hours (or if no response): Please contact the Infectious Diseases Office at [phone]     The above assessment and plan were discussed with Natalie

## 2020-04-17 NOTE — PROGRESS NOTE ADULT - ASSESSMENT
Patient is a 65y old  Female who presents with a chief complaint of SOB AND HYPOXIA     Neuro   - precedex off  - Monitor for mental status       CV  - 20-Asa held for plan of renal BX on next Tuesday   - Required IVP hydralazine for elevated BP over 200  - Change lopressr to coreg 12.5mg BID  - Hydralazine 50mg [id number] and norvasc 10mg OGT with holding parameter  - hydralazine 10 mg IV q6 PRN MAP>100    Pulmonary  - AC/VC 24/380/5/30%  - AB.39/40/143/24/ 99%   - Repeat CPAP trial  today     GI  - Nepro bolus feed 180ml Q 6 hours    - pepcid 20mg IV QD  - -218 mg/dl   - Last BM on 4/15, lactulose added      - Renal plan for bx on   - Making urine   - Will hold HD today as per renal   - Phoslo decreased to 667mg BID    Heme   -h/h: 6.3/20.8   -1 Unit PRBC given   -Heparin gtt d/c 2/ drop h/h  -f/u Tumor Lysis labs       ID  - Febrile sent blood from [facility] and pripheral urine culture and UA  -f/u blood cx  - vanco level 7.5  1G gaiven over night f/u Level for daily dosing   - continue meropenum for empiric line sepsis  - f/u BCx - neg to date and Urine culture shows normal beka  - check inflammatory markers daily  - s/p anakinra (4/10-)  -  solumedrol 50mg IV q 8hrs (until )  restart for 5 more day   -  ACTEMRA  400mg IVPB x 1 given (d/w Dr Foote ID)   - completed plaquenil   - repeat COVID test sent  - still positive    ENDO  - BG in the 200, will increase lantus 8u BID  - continue HISS  - IV synthroid      Lines   - R IJ moni (), L radial A-line  - L  IJ  triple lumen (4/3-) Patient is a 65y old  Female who presents with a chief complaint of SOB AND HYPOXIA     Neuro   - restart precedex for anxiety  - Monitor for mental status       CV  - 20-Asa held for plan of renal BX on next Tuesday   - Required IVP hydralazine for elevated BP over 200  - increased coreg  to 25mg BID for persistent HTN  - Hydralazine 50mg [id number] and norvasc 10mg OGT with holding parameter  - hydralazine 10 mg IV q6 PRN MAP>100    Pulmonary  - AC/VC 24/380/5/30%  - AB.39/40/143/24/ 99%   - CPAP trial  today   - CXR- shows unchanged bilateral opacities    GI  - Nepro bolus feed 180ml Q 6 hours    - pepcid 20mg IV QD  - -218 mg/dl   - Last BM on 4/15, lactulose added      - Renal plan for bx on , ASA on hold  - no indication for steroids per Renal  - Making urine   - Will hold HD today as per renal   - hypokalemia- KCl supplemented  - Phoslo stopped    Heme   -h/h: 6.3/20.8   -1 Unit PRBC given , H/H 8.  -Heparin gtt d/c 2/2 drop h/h  - f/u H/H - if stable start Heparin gtt for hypercoagulable state secondary to Covid  -f/u Tumor Lysis labs       ID  - Febrile sent blood from [facility] and peripheral urine culture and UA  -f/u blood cx  - vanco level 7.5  1G given over night f/u Level for daily dosing   - continue meropenum for empiric line sepsis  - f/u BCx - neg to date and Urine culture shows normal beka  - check inflammatory markers daily  - s/p anakinra (4/10-)  -  solumedrol 50mg IV q 8hrs (until )  restart for 5 more day - stopped - no clear indication  -  ACTEMRA  400mg IVPB x 1 given (d/w Dr Foote ID)   - completed plaquenil   - repeat COVID test sent  - still positive    ENDO  - BG in the 200, will increase lantus 8u BID  - continue HISS  - IV synthroid      Lines   - R IJ moni (), L radial A-line  - L  IJ  triple lumen (4/3-) Patient is a 65y old  Female who presents with a chief complaint of SOB AND HYPOXIA     Neuro   - restart precedex for anxiety  - Monitor for mental status       CV  - 20-Asa held for plan of renal BX on next Tuesday   - Required IVP hydralazine for elevated BP over 200  - increased coreg  to 25mg BID for persistent HTN  - Hydralazine 50mg K4shopo and norvasc 10mg OGT with holding parameter  - hydralazine 10 mg IV q6 PRN MAP>100    Pulmonary  - AC/VC 24/380/5/30%  - AB.39/40/143/24/ 99%   - CPAP trial  today   - CXR- shows unchanged bilateral opacities    GI  - Nepro bolus feed 180ml Q 6 hours    - pepcid 20mg IV QD  - -218 mg/dl   - Last BM on 4/15, lactulose added      - Renal plan for bx on , Renal Attg spoke with Nohemi from [facility] for biopsy  - ASA on hold  - no indication for steroids per Renal  - Making urine   - Will hold HD today as per renal   - hypokalemia- KCl supplemented  - Phoslo stopped    Heme   -h/h: 6.3/20.8   -1 Unit PRBC given , H/H 8.  -Heparin gtt d/c 2/2 drop h/h  - f/u H/H - if stable start Heparin gtt for hypercoagulable state secondary to Covid  -f/u Tumor Lysis labs       ID  - Febrile sent blood from [facility] and peripheral urine culture and UA  -f/u blood cx  - vanco level 7.5  1G given over night f/u Level for daily dosing   - continue meropenum for empiric line sepsis  - f/u BCx - neg to date and Urine culture shows normal beka  - check inflammatory markers daily  - s/p anakinra (4/10-)  -  solumedrol 50mg IV q 8hrs (until )  restart for 5 more day - stopped - no clear indication  -  ACTEMRA  400mg IVPB x 1 given (d/w Dr Foote ID)   - completed plaquenil   - repeat COVID test sent  - still positive    ENDO  - BG in the 200, will increase lantus 8u BID  - continue HISS  - IV synthroid      Lines   - R IJ shiley (), L radial A-line  - L  IJ  triple lumen (4/3-)

## 2020-04-17 NOTE — PROGRESS NOTE ADULT - SUBJECTIVE AND OBJECTIVE BOX
Patient is a 65y old  Female who presents with a chief complaint of SOB AND HYPOXIA (2020 13:30)      SUBJECTIVE / OVERNIGHT EVENTS: h/h dropped to 6.3/20.8, Heparin gtt d/c, 1U PRBC given     ADDITIONAL REVIEW OF SYSTEMS: pt is intubated and/or sedated unable to obtaine ROS    MEDICATIONS  (STANDING):  amLODIPine   Tablet 10 milliGRAM(s) Oral daily  artificial  tears Solution 1 Drop(s) Both EYES two times a day  calcium acetate 667 milliGRAM(s) Oral two times a day with meals  carvedilol 12.5 milliGRAM(s) Oral every 12 hours  chlorhexidine 0.12% Liquid 15 milliLiter(s) Oral Mucosa every 12 hours  dextrose 5%. 1000 milliLiter(s) (50 mL/Hr) IV Continuous <Continuous>  dextrose 50% Injectable 12.5 Gram(s) IV Push once  dextrose 50% Injectable 25 Gram(s) IV Push once  dextrose 50% Injectable 25 Gram(s) IV Push once  famotidine Injectable 20 milliGRAM(s) IV Push daily  hydrALAZINE 50 milliGRAM(s) Oral every 6 hours  insulin glargine Injectable (LANTUS) 8 Unit(s) SubCutaneous two times a day  insulin lispro (HumaLOG) corrective regimen sliding scale   SubCutaneous every 6 hours  levothyroxine Injectable 100 MICROGram(s) IV Push at bedtime  meropenem  IVPB 500 milliGRAM(s) IV Intermittent every 24 hours  methylPREDNISolone sodium succinate Injectable 50 milliGRAM(s) IV Push every 8 hours  senna Syrup 10 milliLiter(s) Oral at bedtime  sodium bicarbonate 1300 milliGRAM(s) Oral three times a day    MEDICATIONS  (PRN):  acetaminophen    Suspension .. 975 milliGRAM(s) Enteral Tube every 6 hours PRN Temp greater or equal to 38C (100.4F)  dextrose 40% Gel 15 Gram(s) Oral once PRN Blood Glucose LESS THAN 70 milliGRAM(s)/deciliter  glucagon  Injectable 1 milliGRAM(s) IntraMuscular once PRN Glucose LESS THAN 70 milligrams/deciliter  hydrALAZINE Injectable 10 milliGRAM(s) IV Push every 6 hours PRN SBP>170    CAPILLARY BLOOD GLUCOSE      POCT Blood Glucose.: 265 mg/dL (2020 22:06)  POCT Blood Glucose.: 225 mg/dL (2020 17:09)  POCT Blood Glucose.: 253 mg/dL (2020 11:48)  POCT Blood Glucose.: 246 mg/dL (2020 10:35)  POCT Blood Glucose.: 250 mg/dL (2020 04:29)    I&O's Summary    15 Apr 2020 07:01  -  2020 07:00  --------------------------------------------------------  IN: 1416 mL / OUT: 1220 mL / NET: 196 mL    2020 07:01  -  2020 00:31  --------------------------------------------------------  IN: 378.1 mL / OUT: 1030 mL / NET: -651.9 mL        PHYSICAL EXAM:  Vital Signs Last 24 Hrs  T(C): 36.4 (2020 23:00), Max: 37.9 (2020 04:00)  T(F): 97.6 (2020 23:00), Max: 100.3 (2020 04:00)  HR: 75 (:00) (75 - 120)  BP: --  BP(mean): --  RR: 26 (:) (15 - 26)  SpO2: 98% (:) (97% - 100%)  CONSTITUTIONAL: NAD, well-developed, well-groomed  ENMT: Moist oral mucosa, no pharyngeal injection or exudates; normal dentition  RESPIRATORY: Normal respiratory effort; lungs are clear to auscultation bilaterally  CARDIOVASCULAR: Regular rate and rhythm, normal S1 and S2, no murmur/rub/gallop; No lower extremity edema; Peripheral pulses are 2+ bilaterally  ABDOMEN: Nontender to palpation, normoactive bowel sounds, no rebound/guarding; No hepatosplenomegaly  PSYCH: A+O to person, place, and time; affect appropriate  NEUROLOGY: CN 2-12 are intact and symmetric; no gross sensory deficits   SKIN: No rashes; no palpable lesions    LABS:                        6.3    10.57 )-----------( 241      ( 2020 20:14 )             20.8     04-16    139  |  98  |  96<H>  ----------------------------<  242<H>  3.3<L>   |  25  |  4.39<H>    Ca    8.3<L>      2020 15:51  Phos  4.4     -  Mg     2.3     -16    TPro  5.2<L>  /  Alb  2.2<L>  /  TBili  0.4  /  DBili  x   /  AST  213<H>  /  ALT  207<H>  /  AlkPhos  1023<H>  -16    PT/INR - ( 2020 01:21 )   PT: 13.4 sec;   INR: 1.17 ratio         PTT - ( 2020 20:14 )  PTT:86.4 sec  CARDIAC MARKERS ( 15 Apr 2020 15:00 )  x     / x     / 27 U/L / x     / x      CARDIAC MARKERS ( 15 Apr 2020 02:23 )  x     / x     / 32 U/L / x     / x          Urinalysis Basic - ( 2020 11:45 )    Color: Yellow / Appearance: Clear / S.017 / pH: x  Gluc: x / Ketone: Negative  / Bili: Negative / Urobili: Negative   Blood: x / Protein: 300 mg/dL / Nitrite: Negative   Leuk Esterase: Negative / RBC: 6 /hpf / WBC 25 /HPF   Sq Epi: x / Non Sq Epi: 1 /hpf / Bacteria: Negative        COVID-19 PCR: Detected (2020 18:00)      RADIOLOGY & ADDITIONAL TESTS:  Imaging from Last 24 Hours:    Electrocardiogram/QTc Interval:    COORDINATION OF CARE:  Care Discussed with Consultants/Other Providers: Patient is a 65y old  Female who presents with a chief complaint of SOB AND HYPOXIA       SUBJECTIVE / OVERNIGHT EVENTS: h/h dropped to 6.3/20.8, Heparin gtt d/c, 1U PRBC given.          ADDITIONAL REVIEW OF SYSTEMS: pt is intubated and/or sedated unable to obtain ROS    MEDICATIONS  (STANDING):  amLODIPine   Tablet 10 milliGRAM(s) Oral daily  artificial  tears Solution 1 Drop(s) Both EYES two times a day  calcium acetate 667 milliGRAM(s) Oral two times a day with meals  carvedilol 12.5 milliGRAM(s) Oral every 12 hours  chlorhexidine 0.12% Liquid 15 milliLiter(s) Oral Mucosa every 12 hours  dextrose 5%. 1000 milliLiter(s) (50 mL/Hr) IV Continuous <Continuous>  dextrose 50% Injectable 12.5 Gram(s) IV Push once  dextrose 50% Injectable 25 Gram(s) IV Push once  dextrose 50% Injectable 25 Gram(s) IV Push once  famotidine Injectable 20 milliGRAM(s) IV Push daily  hydrALAZINE 50 milliGRAM(s) Oral every 6 hours  insulin glargine Injectable (LANTUS) 8 Unit(s) SubCutaneous two times a day  insulin lispro (HumaLOG) corrective regimen sliding scale   SubCutaneous every 6 hours  levothyroxine Injectable 100 MICROGram(s) IV Push at bedtime  meropenem  IVPB 500 milliGRAM(s) IV Intermittent every 24 hours  methylPREDNISolone sodium succinate Injectable 50 milliGRAM(s) IV Push every 8 hours  senna Syrup 10 milliLiter(s) Oral at bedtime  sodium bicarbonate 1300 milliGRAM(s) Oral three times a day    MEDICATIONS  (PRN):  acetaminophen    Suspension .. 975 milliGRAM(s) Enteral Tube every 6 hours PRN Temp greater or equal to 38C (100.4F)  dextrose 40% Gel 15 Gram(s) Oral once PRN Blood Glucose LESS THAN 70 milliGRAM(s)/deciliter  glucagon  Injectable 1 milliGRAM(s) IntraMuscular once PRN Glucose LESS THAN 70 milligrams/deciliter  hydrALAZINE Injectable 10 milliGRAM(s) IV Push every 6 hours PRN SBP>170    CAPILLARY BLOOD GLUCOSE      POCT Blood Glucose.: 265 mg/dL (2020 22:06)  POCT Blood Glucose.: 225 mg/dL (2020 17:09)  POCT Blood Glucose.: 253 mg/dL (2020 11:48)  POCT Blood Glucose.: 246 mg/dL (2020 10:35)  POCT Blood Glucose.: 250 mg/dL (2020 04:29)    I&O's Summary    15 Apr 2020 07:01  -  2020 07:00  --------------------------------------------------------  IN: 1416 mL / OUT: 1220 mL / NET: 196 mL    2020 07:01  -  2020 00:31  --------------------------------------------------------  IN: 378.1 mL / OUT: 1030 mL / NET: -651.9 mL      Vital Signs Last 24 Hrs  T(C): 36.4 (2020 23:00), Max: 37.9 (2020 04:00)  T(F): 97.6 (2020 23:00), Max: 100.3 (2020 04:00)  HR: 75 (:00) (75 - 120)  BP: --  BP(mean): --  RR: 26 (:) (15 - 26)  SpO2: 98% (:) (97% - 100%)  CONSTITUTIONAL: NAD, well-developed, well-groomed  ENMT: Moist oral mucosa, no pharyngeal injection or exudates; normal dentition  RESPIRATORY: Normal respiratory effort; lungs are clear to auscultation bilaterally  CARDIOVASCULAR: Regular rate and rhythm, normal S1 and S2, no murmur/rub/gallop; No lower extremity edema; Peripheral pulses are 2+ bilaterally  ABDOMEN: Nontender to palpation, normoactive bowel sounds, no rebound/guarding; No hepatosplenomegaly  PSYCH: A+O to person, place, and time; affect appropriate  NEUROLOGY: CN 2-12 are intact and symmetric; no gross sensory deficits   SKIN: No rashes; no palpable lesions    LABS:                        6.3    10.57 )-----------( 241      ( 2020 20:14 )             20.8     04-16    139  |  98  |  96<H>  ----------------------------<  242<H>  3.3<L>   |  25  |  4.39<H>    Ca    8.3<L>      2020 15:51  Phos  4.4     04-16  Mg     2.3     04-16    TPro  5.2<L>  /  Alb  2.2<L>  /  TBili  0.4  /  DBili  x   /  AST  213<H>  /  ALT  207<H>  /  AlkPhos  1023<H>  04-16    PT/INR - ( 2020 01:21 )   PT: 13.4 sec;   INR: 1.17 ratio         PTT - ( 2020 20:14 )  PTT:86.4 sec  CARDIAC MARKERS ( 15 Apr 2020 15:00 )  x     / x     / 27 U/L / x     / x      CARDIAC MARKERS ( 15 Apr 2020 02:23 )  x     / x     / 32 U/L / x     / x          Urinalysis Basic - ( 2020 11:45 )    Color: Yellow / Appearance: Clear / S.017 / pH: x  Gluc: x / Ketone: Negative  / Bili: Negative / Urobili: Negative   Blood: x / Protein: 300 mg/dL / Nitrite: Negative   Leuk Esterase: Negative / RBC: 6 /hpf / WBC 25 /HPF   Sq Epi: x / Non Sq Epi: 1 /hpf / Bacteria: Negative        COVID-19 PCR: Detected (2020 18:00)      RADIOLOGY & ADDITIONAL TESTS:  Imaging from Last 24 Hours:    Electrocardiogram/QTc Interval:    COORDINATION OF CARE:  Care Discussed with Consultants/Other Providers: Patient is a 65y old  Female who presents with a chief complaint of SOB AND HYPOXIA       SUBJECTIVE / OVERNIGHT EVENTS: h/h dropped to 6.3/20.8, Heparin gtt d/c, 1U PRBC given.          ADDITIONAL REVIEW OF SYSTEMS: pt is intubated and/or sedated unable to obtain ROS    MEDICATIONS  (STANDING):  amLODIPine   Tablet 10 milliGRAM(s) Oral daily  artificial  tears Solution 1 Drop(s) Both EYES two times a day  calcium acetate 667 milliGRAM(s) Oral two times a day with meals  carvedilol 12.5 milliGRAM(s) Oral every 12 hours  chlorhexidine 0.12% Liquid 15 milliLiter(s) Oral Mucosa every 12 hours  dextrose 5%. 1000 milliLiter(s) (50 mL/Hr) IV Continuous <Continuous>  dextrose 50% Injectable 12.5 Gram(s) IV Push once  dextrose 50% Injectable 25 Gram(s) IV Push once  dextrose 50% Injectable 25 Gram(s) IV Push once  famotidine Injectable 20 milliGRAM(s) IV Push daily  hydrALAZINE 50 milliGRAM(s) Oral every 6 hours  insulin glargine Injectable (LANTUS) 8 Unit(s) SubCutaneous two times a day  insulin lispro (HumaLOG) corrective regimen sliding scale   SubCutaneous every 6 hours  levothyroxine Injectable 100 MICROGram(s) IV Push at bedtime  meropenem  IVPB 500 milliGRAM(s) IV Intermittent every 24 hours  methylPREDNISolone sodium succinate Injectable 50 milliGRAM(s) IV Push every 8 hours  senna Syrup 10 milliLiter(s) Oral at bedtime  sodium bicarbonate 1300 milliGRAM(s) Oral three times a day    MEDICATIONS  (PRN):  acetaminophen    Suspension .. 975 milliGRAM(s) Enteral Tube every 6 hours PRN Temp greater or equal to 38C (100.4F)  dextrose 40% Gel 15 Gram(s) Oral once PRN Blood Glucose LESS THAN 70 milliGRAM(s)/deciliter  glucagon  Injectable 1 milliGRAM(s) IntraMuscular once PRN Glucose LESS THAN 70 milligrams/deciliter  hydrALAZINE Injectable 10 milliGRAM(s) IV Push every 6 hours PRN SBP>170    CAPILLARY BLOOD GLUCOSE      POCT Blood Glucose.: 265 mg/dL (2020 22:06)  POCT Blood Glucose.: 225 mg/dL (2020 17:09)  POCT Blood Glucose.: 253 mg/dL (2020 11:48)  POCT Blood Glucose.: 246 mg/dL (2020 10:35)  POCT Blood Glucose.: 250 mg/dL (2020 04:29)    I&O's Summary    15 Apr 2020 07:01  -  2020 07:00  --------------------------------------------------------  IN: 1416 mL / OUT: 1220 mL / NET: 196 mL    2020 07:01  -  2020 00:31  --------------------------------------------------------  IN: 378.1 mL / OUT: 1030 mL / NET: -651.9 mL      Vital Signs Last 24 Hrs  T(C): 36.4 (2020 23:00), Max: 37.9 (2020 04:00)  T(F): 97.6 (2020 23:00), Max: 100.3 (2020 04:00)  HR: 75 (:00) (75 - 120)  BP: --  BP(mean): --  RR: 26 (2020 23:00) (15 - 26)  SpO2: 98% (2020 23:00) (97% - 100%)    LABS:                        6.3    10.57 )-----------( 241      ( 2020 20:14 )             20.8     04-16    139  |  98  |  96<H>  ----------------------------<  242<H>  3.3<L>   |  25  |  4.39<H>    Ca    8.3<L>      2020 15:51  Phos  4.4     04-16  Mg     2.3     04-16    TPro  5.2<L>  /  Alb  2.2<L>  /  TBili  0.4  /  DBili  x   /  AST  213<H>  /  ALT  207<H>  /  AlkPhos  1023<H>  04-16    PT/INR - ( 2020 01:21 )   PT: 13.4 sec;   INR: 1.17 ratio         PTT - ( 2020 20:14 )  PTT:86.4 sec  CARDIAC MARKERS ( 15 Apr 2020 15:00 )  x     / x     / 27 U/L / x     / x      CARDIAC MARKERS ( 15 Apr 2020 02:23 )  x     / x     / 32 U/L / x     / x          Urinalysis Basic - ( 2020 11:45 )    Color: Yellow / Appearance: Clear / S.017 / pH: x  Gluc: x / Ketone: Negative  / Bili: Negative / Urobili: Negative   Blood: x / Protein: 300 mg/dL / Nitrite: Negative   Leuk Esterase: Negative / RBC: 6 /hpf / WBC 25 /HPF   Sq Epi: x / Non Sq Epi: 1 /hpf / Bacteria: Negative        COVID-19 PCR: Detected (2020 18:00) Patient is a 65y old  Female who presents with a chief complaint of SOB AND HYPOXIA       SUBJECTIVE / OVERNIGHT EVENTS: h/h dropped to 6.3/20.8, Heparin gtt d/c, 1U PRBC given.          ADDITIONAL REVIEW OF SYSTEMS: pt is intubated and sedated unable to obtain ROS    MEDICATIONS  (STANDING):  amLODIPine   Tablet 10 milliGRAM(s) Oral daily  artificial  tears Solution 1 Drop(s) Both EYES two times a day  calcium acetate 667 milliGRAM(s) Oral two times a day with meals  carvedilol 12.5 milliGRAM(s) Oral every 12 hours  chlorhexidine 0.12% Liquid 15 milliLiter(s) Oral Mucosa every 12 hours  dextrose 5%. 1000 milliLiter(s) (50 mL/Hr) IV Continuous <Continuous>  dextrose 50% Injectable 12.5 Gram(s) IV Push once  dextrose 50% Injectable 25 Gram(s) IV Push once  dextrose 50% Injectable 25 Gram(s) IV Push once  famotidine Injectable 20 milliGRAM(s) IV Push daily  hydrALAZINE 50 milliGRAM(s) Oral every 6 hours  insulin glargine Injectable (LANTUS) 8 Unit(s) SubCutaneous two times a day  insulin lispro (HumaLOG) corrective regimen sliding scale   SubCutaneous every 6 hours  levothyroxine Injectable 100 MICROGram(s) IV Push at bedtime  meropenem  IVPB 500 milliGRAM(s) IV Intermittent every 24 hours  methylPREDNISolone sodium succinate Injectable 50 milliGRAM(s) IV Push every 8 hours  senna Syrup 10 milliLiter(s) Oral at bedtime  sodium bicarbonate 1300 milliGRAM(s) Oral three times a day    MEDICATIONS  (PRN):  acetaminophen    Suspension .. 975 milliGRAM(s) Enteral Tube every 6 hours PRN Temp greater or equal to 38C (100.4F)  dextrose 40% Gel 15 Gram(s) Oral once PRN Blood Glucose LESS THAN 70 milliGRAM(s)/deciliter  glucagon  Injectable 1 milliGRAM(s) IntraMuscular once PRN Glucose LESS THAN 70 milligrams/deciliter  hydrALAZINE Injectable 10 milliGRAM(s) IV Push every 6 hours PRN SBP>170    CAPILLARY BLOOD GLUCOSE      POCT Blood Glucose.: 265 mg/dL (2020 22:06)  POCT Blood Glucose.: 225 mg/dL (2020 17:09)  POCT Blood Glucose.: 253 mg/dL (2020 11:48)  POCT Blood Glucose.: 246 mg/dL (2020 10:35)  POCT Blood Glucose.: 250 mg/dL (2020 04:29)    I&O's Summary    15 Apr 2020 07:01  -  2020 07:00  --------------------------------------------------------  IN: 1416 mL / OUT: 1220 mL / NET: 196 mL    2020 07:01  -  2020 00:31  --------------------------------------------------------  IN: 378.1 mL / OUT: 1030 mL / NET: -651.9 mL      Vital Signs Last 24 Hrs  T(C): 36.4 (2020 23:00), Max: 37.9 (2020 04:00)  T(F): 97.6 (2020 23:00), Max: 100.3 (2020 04:00)  HR: 75 (2020 23:00) (75 - 120)  BP: --  BP(mean): --  RR: 26 (2020 23:00) (15 - 26)  SpO2: 98% (2020 23:00) (97% - 100%)    PE  wnf nad sed  heent ett ogt  lungs cta ant  heart reg s1s2 no mrg  abd soft no clear tenderness  ext no cce  neuro opens eyes to voice, follows simple commands to move hands/feet  LABS:                        6.3    10.57 )-----------( 241      ( 2020 20:14 )             20.8     04-16    139  |  98  |  96<H>  ----------------------------<  242<H>  3.3<L>   |  25  |  4.39<H>    Ca    8.3<L>      2020 15:51  Phos  4.4     04-16  Mg     2.3     04-16    TPro  5.2<L>  /  Alb  2.2<L>  /  TBili  0.4  /  DBili  x   /  AST  213<H>  /  ALT  207<H>  /  AlkPhos  1023<H>  04-16    PT/INR - ( 2020 01:21 )   PT: 13.4 sec;   INR: 1.17 ratio         PTT - ( 2020 20:14 )  PTT:86.4 sec  CARDIAC MARKERS ( 15 Apr 2020 15:00 )  x     / x     / 27 U/L / x     / x      CARDIAC MARKERS ( 15 Apr 2020 02:23 )  x     / x     / 32 U/L / x     / x          Urinalysis Basic - ( 2020 11:45 )    Color: Yellow / Appearance: Clear / S.017 / pH: x  Gluc: x / Ketone: Negative  / Bili: Negative / Urobili: Negative   Blood: x / Protein: 300 mg/dL / Nitrite: Negative   Leuk Esterase: Negative / RBC: 6 /hpf / WBC 25 /HPF   Sq Epi: x / Non Sq Epi: 1 /hpf / Bacteria: Negative        COVID-19 PCR: Detected (2020 18:00)

## 2020-04-17 NOTE — PROGRESS NOTE ADULT - ASSESSMENT
65F PMHx DM2, HTN, HLD who present to ED for fever/dyspnea x 1 week.  Per HPI, pt desaturated at home O2 sat 70% which improved nasal cannula (another family member).  In ED hypoxia 70s on nasal cannula s/p intubation.  Covid positive with CXR b/l opacities. Given ceftriaxone, azithromycin, hydroxychlorquine.     Nephrology consulted for MARIO or MARIO on CKD.     MARIO/ Proteinuria    in setting septic shock Covid19 infection, low BP, also noted to have nephrotic range proteinuria  - s/p HD on 4/13, has stable labs since then, non oliguroic. 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 IVIG might have some role. Plan for kidney biopsy next week 4/21/20). Other serological workup was sent and pending.  - Monitor labs and urine output. Avoid NSAIDs, ACEI/ARBS, RCA and nephrotoxins. Dose vancomycin per level.      Metabolic acidosis   in the setting of MARIO   continue oral bicarbonate    Hyperphosphatemia  in the setting of MARIO   continue Ca acetate  Monitor serum phosphorus    Hypocalcemia   in the setting of hyperphosphatemia and hypoalbuminemia  - continue calcium acetate  - monitor ionized calcium, replete to keep >1.1

## 2020-04-17 NOTE — PROGRESS NOTE ADULT - SUBJECTIVE AND OBJECTIVE BOX
Follow Up:      Interval History:    REVIEW OF SYSTEMS  [  ] ROS unobtainable because:    [  ] All other systems negative except as noted below    Constitutional:  [ ] fever [ ] chills  [ ] weight loss  [ ] weakness  Skin:  [ ] rash [ ] phlebitis	  Eyes: [ ] icterus [ ] pain  [ ] discharge	  ENMT: [ ] sore throat  [ ] thrush [ ] ulcers [ ] exudates  Respiratory: [ ] dyspnea [ ] hemoptysis [ ] cough [ ] sputum	  Cardiovascular:  [ ] chest pain [ ] palpitations [ ] edema	  Gastrointestinal:  [ ] nausea [ ] vomiting [ ] diarrhea [ ] constipation [ ] pain	  Genitourinary:  [ ] dysuria [ ] frequency [ ] hematuria [ ] discharge [ ] flank pain  [ ] incontinence  Musculoskeletal:  [ ] myalgias [ ] arthralgias [ ] arthritis  [ ] back pain  Neurological:  [ ] headache [ ] seizures  [ ] confusion/altered mental status    Allergies  No Known Allergies        ANTIMICROBIALS:  meropenem  IVPB 500 every 24 hours      OTHER MEDS:  MEDICATIONS  (STANDING):  acetaminophen    Suspension .. 975 every 6 hours PRN  amLODIPine   Tablet 10 daily  carvedilol 25 every 12 hours  dexMEDEtomidine Infusion 0.1 <Continuous>  dextrose 40% Gel 15 once PRN  dextrose 50% Injectable 12.5 once  dextrose 50% Injectable 25 once  dextrose 50% Injectable 25 once  famotidine Injectable 20 daily  glucagon  Injectable 1 once PRN  hydrALAZINE 50 every 6 hours  hydrALAZINE Injectable 10 every 6 hours PRN  insulin glargine Injectable (LANTUS) 8 two times a day  insulin lispro (HumaLOG) corrective regimen sliding scale  every 6 hours  levothyroxine Injectable 100 at bedtime  senna Syrup 10 at bedtime      Vital Signs Last 24 Hrs  T(C): 36.7 (2020 16:00), Max: 36.7 (2020 08:00)  T(F): 98.1 (2020 16:00), Max: 98.1 (2020 08:00)  HR: 63 (2020 22:24) (63 - 90)  BP: --  BP(mean): --  RR: 10 (2020 12:00) (10 - 26)  SpO2: 97% (2020 22:24) (97% - 100%)    PHYSICAL EXAMINATION:  General: Alert and Awake, NAD  HEENT: PERRL, EOMI  Neck: Supple  Cardiac: RRR, No M/R/G  Resp: CTAB, No Wh/Rh/Ra  Abdomen: NBS, NT/ND, No HSM, No rigidity or guarding  MSK: No LE edema. No Calf tenderness  : No razo  Skin: No rashes or lesions. Skin is warm and dry to the touch.   Neuro: Alert and Awake. CN 2-12 Grossly intact. Moves all four extremities spontaneously.  Psych: Calm, Pleasant, Cooperative                          7.4    7.37  )-----------( 221      ( 2020 17:53 )             23.9           140  |  98  |  101<H>  ----------------------------<  205<H>  3.5   |  25  |  4.31<H>    Ca    9.0      2020 17:53  Phos  4.0       Mg     2.3         TPro  5.4<L>  /  Alb  2.3<L>  /  TBili  0.4  /  DBili  x   /  AST  145<H>  /  ALT  180<H>  /  AlkPhos  881<H>        Urinalysis Basic - ( 2020 11:45 )    Color: Yellow / Appearance: Clear / S.017 / pH: x  Gluc: x / Ketone: Negative  / Bili: Negative / Urobili: Negative   Blood: x / Protein: 300 mg/dL / Nitrite: Negative   Leuk Esterase: Negative / RBC: 6 /hpf / WBC 25 /HPF   Sq Epi: x / Non Sq Epi: 1 /hpf / Bacteria: Negative        MICROBIOLOGY:  Vancomycin Level, Random: 12.9 ug/mL (20 @ 04:24)  v  .Blood Blood-Peripheral  20   No growth to date.  --  --      .Urine Catheterized  20   No growth  --  --      .Urine Catheterized  20   <10,000 CFU/mL Normal Urogenital Becca  --  --      .Blood Triple Lumen BROWN  20   No growth to date.  --  --      .Blood Blood-Peripheral  20   No Growth Final  --  --      .Blood Blood-Peripheral  20   No Growth Final  --  --                RADIOLOGY:    <The imaging below has been reviewed and visualized by me independently. Findings as detailed in report below> Follow Up:  COVID19    Interval History: afebrile overnight. no other acute events.     REVIEW OF SYSTEMS  [ x ] ROS unobtainable because:  unable to obtain as patient is intubated and sedated.   [  ] All other systems negative except as noted below:	    Constitutional:  [ ] fever [ ] chills  [ ] weight loss  [ ] weakness  Skin:  [ ] rash [ ] phlebitis	  Eyes: [ ] icterus [ ] pain  [ ] discharge	  ENMT: [ ] sore throat  [ ] thrush [ ] ulcers [ ] exudates  Respiratory: [ ] dyspnea [ ] hemoptysis [ ] cough [ ] sputum	  Cardiovascular:  [ ] chest pain [ ] palpitations [ ] edema	  Gastrointestinal:  [ ] nausea [ ] vomiting [ ] diarrhea [ ] constipation [ ] pain	  Genitourinary:  [ ] dysuria [ ] frequency [ ] hematuria [ ] discharge [ ] flank pain  [ ] incontinence  Musculoskeletal:  [ ] myalgias [ ] arthralgias [ ] arthritis  [ ] back pain  Neurological:  [ ] headache [ ] seizures  [ ] confusion/altered mental status    Allergies  No Known Allergies        ANTIMICROBIALS:  meropenem  IVPB 500 every 24 hours      OTHER MEDS:  MEDICATIONS  (STANDING):  acetaminophen    Suspension .. 975 every 6 hours PRN  amLODIPine   Tablet 10 daily  carvedilol 25 every 12 hours  dexMEDEtomidine Infusion 0.1 <Continuous>  dextrose 40% Gel 15 once PRN  dextrose 50% Injectable 12.5 once  dextrose 50% Injectable 25 once  dextrose 50% Injectable 25 once  famotidine Injectable 20 daily  glucagon  Injectable 1 once PRN  hydrALAZINE 50 every 6 hours  hydrALAZINE Injectable 10 every 6 hours PRN  insulin glargine Injectable (LANTUS) 8 two times a day  insulin lispro (HumaLOG) corrective regimen sliding scale  every 6 hours  levothyroxine Injectable 100 at bedtime  senna Syrup 10 at bedtime      Vital Signs Last 24 Hrs  T(C): 36.7 (2020 16:00), Max: 36.7 (2020 08:00)  T(F): 98.1 (2020 16:00), Max: 98.1 (2020 08:00)  HR: 63 (2020 22:24) (63 - 90)  BP: --  BP(mean): --  RR: 10 (2020 12:00) (10 - 26)  SpO2: 97% (2020 22:24) (97% - 100%)    PHYSICAL EXAMINATION:  General: Intubated and Sedated  HEENT: +ETT  Neck: Supple  Cardiac: RRR, No M/R/G  Resp: CTAB, No Wh/Rh/Ra  Abdomen: NBS, NT/ND, No HSM, No rigidity or guarding  MSK: No LE edema. No Calf tenderness  : Glass  Skin: No rashes or lesions. Skin is warm and dry to the touch.   Vascular: RIJ Shiley catheter (with some clotted blood surrounding catheter)  Neuro: Intubated and Sedated, LUE Radial Line (No surrounding erythema, drainage or tenderness to palpation)  Psych: Unable to assess - intubated and sedated                        7.4    7.37  )-----------( 221      ( 2020 17:53 )             23.9           140  |  98  |  101<H>  ----------------------------<  205<H>  3.5   |  25  |  4.31<H>    Ca    9.0      2020 17:53  Phos  4.0       Mg     2.3         TPro  5.4<L>  /  Alb  2.3<L>  /  TBili  0.4  /  DBili  x   /  AST  145<H>  /  ALT  180<H>  /  AlkPhos  881<H>        Urinalysis Basic - ( 2020 11:45 )    Color: Yellow / Appearance: Clear / S.017 / pH: x  Gluc: x / Ketone: Negative  / Bili: Negative / Urobili: Negative   Blood: x / Protein: 300 mg/dL / Nitrite: Negative   Leuk Esterase: Negative / RBC: 6 /hpf / WBC 25 /HPF   Sq Epi: x / Non Sq Epi: 1 /hpf / Bacteria: Negative    C-Reactive Protein, Serum (20 @ 16:10)    C-Reactive Protein, Serum: 2.06 mg/dL    C-Reactive Protein, Serum (20 @ 08:36)    C-Reactive Protein, Serum: 2.28 mg/dL    C-Reactive Protein, Serum (20 @ 04:32)    C-Reactive Protein, Serum: 1.26 mg/dL    C-Reactive Protein, Serum (20 @ 08:07)    C-Reactive Protein, Serum: 0.76 mg/dL      Ferritin, Serum (20 @ 16:10)    Ferritin, Serum: 1183 ng/mL    Ferritin, Serum in AM (20 @ 08:36)    Ferritin, Serum: 1516 ng/mL    Ferritin, Serum (20 @ 04:32)    Ferritin, Serum: 2292: Test Repeated ng/mL        D-Dimer Assay, Quantitative (20 @ 06:36)    D-Dimer Assay, Quantitative: 1582    D-Dimer Assay, Quantitative (04.15.20 @ 15:00)    D-Dimer Assay, Quantitative: 1058    D-Dimer Assay, Quantitative (20 @ 03:12)    D-Dimer Assay, Quantitative: 999      Procalcitonin, Serum (04.15.20 @ 15:00)    Procalcitonin, Serum: 0.66    Procalcitonin, Serum (20 @ 01:21)    Procalcitonin, Serum: 0.60      MICROBIOLOGY:    Vancomycin Level, Random: 12.9 ug/mL (20 @ 04:24)  v  .Blood Blood-Peripheral  20   No growth to date.  --  --      .Urine Catheterized  20   No growth  --  --      .Urine Catheterized  20   <10,000 CFU/mL Normal Urogenital Becca  --  --      .Blood Triple Lumen BROWN  20   No growth to date.  --  --      .Blood Blood-Peripheral  20   No Growth Final  --  --      .Blood Blood-Peripheral  20   No Growth Final  --  --      RADIOLOGY:    <The imaging below has been reviewed and visualized by me independently. Findings as detailed in report below>    EXAM:  XR CHEST PORTABLE URGENT 1V                        PROCEDURE DATE:  2020    No interval change.

## 2020-04-17 NOTE — PROGRESS NOTE ADULT - ATTENDING COMMENTS
I have seen this patient with the fellow and agree with their assessment and plan. 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 Karen Sprague( Attending)

## 2020-04-18 LAB
ALBUMIN SERPL ELPH-MCNC: 2.1 G/DL — LOW (ref 3.3–5)
ALBUMIN SERPL ELPH-MCNC: 2.3 G/DL — LOW (ref 3.3–5)
ALP SERPL-CCNC: 934 U/L — HIGH (ref 40–120)
ALP SERPL-CCNC: 969 U/L — HIGH (ref 40–120)
ALT FLD-CCNC: 196 U/L — HIGH (ref 10–45)
ALT FLD-CCNC: 221 U/L — HIGH (ref 10–45)
ANION GAP SERPL CALC-SCNC: 16 MMOL/L — SIGNIFICANT CHANGE UP (ref 5–17)
ANION GAP SERPL CALC-SCNC: 18 MMOL/L — HIGH (ref 5–17)
AST SERPL-CCNC: 215 U/L — HIGH (ref 10–40)
AST SERPL-CCNC: 238 U/L — HIGH (ref 10–40)
BASOPHILS # BLD AUTO: 0.02 K/UL — SIGNIFICANT CHANGE UP (ref 0–0.2)
BASOPHILS NFR BLD AUTO: 0.3 % — SIGNIFICANT CHANGE UP (ref 0–2)
BILIRUB SERPL-MCNC: 0.4 MG/DL — SIGNIFICANT CHANGE UP (ref 0.2–1.2)
BILIRUB SERPL-MCNC: 0.4 MG/DL — SIGNIFICANT CHANGE UP (ref 0.2–1.2)
BUN SERPL-MCNC: 100 MG/DL — HIGH (ref 7–23)
BUN SERPL-MCNC: 103 MG/DL — HIGH (ref 7–23)
CALCIUM SERPL-MCNC: 8.8 MG/DL — SIGNIFICANT CHANGE UP (ref 8.4–10.5)
CALCIUM SERPL-MCNC: 9.1 MG/DL — SIGNIFICANT CHANGE UP (ref 8.4–10.5)
CHLORIDE SERPL-SCNC: 98 MMOL/L — SIGNIFICANT CHANGE UP (ref 96–108)
CHLORIDE SERPL-SCNC: 99 MMOL/L — SIGNIFICANT CHANGE UP (ref 96–108)
CO2 SERPL-SCNC: 24 MMOL/L — SIGNIFICANT CHANGE UP (ref 22–31)
CO2 SERPL-SCNC: 24 MMOL/L — SIGNIFICANT CHANGE UP (ref 22–31)
CREAT ?TM UR-MCNC: 62 MG/DL — SIGNIFICANT CHANGE UP
CREAT SERPL-MCNC: 4.47 MG/DL — HIGH (ref 0.5–1.3)
CREAT SERPL-MCNC: 4.54 MG/DL — HIGH (ref 0.5–1.3)
CULTURE RESULTS: SIGNIFICANT CHANGE UP
EOSINOPHIL # BLD AUTO: 0 K/UL — SIGNIFICANT CHANGE UP (ref 0–0.5)
EOSINOPHIL NFR BLD AUTO: 0 % — SIGNIFICANT CHANGE UP (ref 0–6)
GAS PNL BLDA: SIGNIFICANT CHANGE UP
GLUCOSE BLDC GLUCOMTR-MCNC: 228 MG/DL — HIGH (ref 70–99)
GLUCOSE BLDC GLUCOMTR-MCNC: 230 MG/DL — HIGH (ref 70–99)
GLUCOSE BLDC GLUCOMTR-MCNC: 245 MG/DL — HIGH (ref 70–99)
GLUCOSE BLDC GLUCOMTR-MCNC: 246 MG/DL — HIGH (ref 70–99)
GLUCOSE BLDC GLUCOMTR-MCNC: 259 MG/DL — HIGH (ref 70–99)
GLUCOSE SERPL-MCNC: 223 MG/DL — HIGH (ref 70–99)
GLUCOSE SERPL-MCNC: 244 MG/DL — HIGH (ref 70–99)
HCT VFR BLD CALC: 23.7 % — LOW (ref 34.5–45)
HCT VFR BLD CALC: 24 % — LOW (ref 34.5–45)
HGB BLD-MCNC: 7.6 G/DL — LOW (ref 11.5–15.5)
HGB BLD-MCNC: 7.7 G/DL — LOW (ref 11.5–15.5)
IMM GRANULOCYTES NFR BLD AUTO: 1.2 % — SIGNIFICANT CHANGE UP (ref 0–1.5)
INR BLD: 0.94 RATIO — SIGNIFICANT CHANGE UP (ref 0.88–1.16)
LYMPHOCYTES # BLD AUTO: 0.78 K/UL — LOW (ref 1–3.3)
LYMPHOCYTES # BLD AUTO: 10.1 % — LOW (ref 13–44)
MAGNESIUM SERPL-MCNC: 2.4 MG/DL — SIGNIFICANT CHANGE UP (ref 1.6–2.6)
MCHC RBC-ENTMCNC: 26.7 PG — LOW (ref 27–34)
MCHC RBC-ENTMCNC: 26.9 PG — LOW (ref 27–34)
MCHC RBC-ENTMCNC: 32.1 GM/DL — SIGNIFICANT CHANGE UP (ref 32–36)
MCHC RBC-ENTMCNC: 32.1 GM/DL — SIGNIFICANT CHANGE UP (ref 32–36)
MCV RBC AUTO: 83.2 FL — SIGNIFICANT CHANGE UP (ref 80–100)
MCV RBC AUTO: 83.9 FL — SIGNIFICANT CHANGE UP (ref 80–100)
MONOCYTES # BLD AUTO: 1.22 K/UL — HIGH (ref 0–0.9)
MONOCYTES NFR BLD AUTO: 15.8 % — HIGH (ref 2–14)
NEUTROPHILS # BLD AUTO: 5.62 K/UL — SIGNIFICANT CHANGE UP (ref 1.8–7.4)
NEUTROPHILS NFR BLD AUTO: 72.6 % — SIGNIFICANT CHANGE UP (ref 43–77)
NRBC # BLD: 0 /100 WBCS — SIGNIFICANT CHANGE UP (ref 0–0)
NRBC # BLD: 0 /100 WBCS — SIGNIFICANT CHANGE UP (ref 0–0)
PHOSPHATE SERPL-MCNC: 5.4 MG/DL — HIGH (ref 2.5–4.5)
PLATELET # BLD AUTO: 241 K/UL — SIGNIFICANT CHANGE UP (ref 150–400)
PLATELET # BLD AUTO: 254 K/UL — SIGNIFICANT CHANGE UP (ref 150–400)
POTASSIUM SERPL-MCNC: 3.1 MMOL/L — LOW (ref 3.5–5.3)
POTASSIUM SERPL-MCNC: 3.4 MMOL/L — LOW (ref 3.5–5.3)
POTASSIUM SERPL-SCNC: 3.1 MMOL/L — LOW (ref 3.5–5.3)
POTASSIUM SERPL-SCNC: 3.4 MMOL/L — LOW (ref 3.5–5.3)
PROCALCITONIN SERPL-MCNC: 0.47 NG/ML — HIGH (ref 0.02–0.1)
PROT ?TM UR-MCNC: 686 MG/DL — HIGH (ref 0–12)
PROT SERPL-MCNC: 5.3 G/DL — LOW (ref 6–8.3)
PROT SERPL-MCNC: 5.5 G/DL — LOW (ref 6–8.3)
PROTHROM AB SERPL-ACNC: 10.7 SEC — SIGNIFICANT CHANGE UP (ref 10–12.9)
RBC # BLD: 2.85 M/UL — LOW (ref 3.8–5.2)
RBC # BLD: 2.86 M/UL — LOW (ref 3.8–5.2)
RBC # FLD: 15.2 % — HIGH (ref 10.3–14.5)
RBC # FLD: 15.5 % — HIGH (ref 10.3–14.5)
SODIUM SERPL-SCNC: 139 MMOL/L — SIGNIFICANT CHANGE UP (ref 135–145)
SODIUM SERPL-SCNC: 140 MMOL/L — SIGNIFICANT CHANGE UP (ref 135–145)
SPECIMEN SOURCE: SIGNIFICANT CHANGE UP
VANCOMYCIN FLD-MCNC: 18.7 UG/ML — SIGNIFICANT CHANGE UP
WBC # BLD: 10.47 K/UL — SIGNIFICANT CHANGE UP (ref 3.8–10.5)
WBC # BLD: 7.73 K/UL — SIGNIFICANT CHANGE UP (ref 3.8–10.5)
WBC # FLD AUTO: 10.47 K/UL — SIGNIFICANT CHANGE UP (ref 3.8–10.5)
WBC # FLD AUTO: 7.73 K/UL — SIGNIFICANT CHANGE UP (ref 3.8–10.5)

## 2020-04-18 PROCEDURE — 99232 SBSQ HOSP IP/OBS MODERATE 35: CPT | Mod: GC

## 2020-04-18 PROCEDURE — 99291 CRITICAL CARE FIRST HOUR: CPT

## 2020-04-18 RX ORDER — POTASSIUM CHLORIDE 20 MEQ
40 PACKET (EA) ORAL ONCE
Refills: 0 | Status: DISCONTINUED | OUTPATIENT
Start: 2020-04-18 | End: 2020-04-18

## 2020-04-18 RX ORDER — POTASSIUM CHLORIDE 20 MEQ
40 PACKET (EA) ORAL ONCE
Refills: 0 | Status: COMPLETED | OUTPATIENT
Start: 2020-04-18 | End: 2020-04-18

## 2020-04-18 RX ORDER — HYDRALAZINE HCL 50 MG
25 TABLET ORAL EVERY 6 HOURS
Refills: 0 | Status: DISCONTINUED | OUTPATIENT
Start: 2020-04-18 | End: 2020-04-19

## 2020-04-18 RX ORDER — HEPARIN SODIUM 5000 [USP'U]/ML
5000 INJECTION INTRAVENOUS; SUBCUTANEOUS EVERY 8 HOURS
Refills: 0 | Status: DISCONTINUED | OUTPATIENT
Start: 2020-04-18 | End: 2020-04-18

## 2020-04-18 RX ADMIN — CHLORHEXIDINE GLUCONATE 15 MILLILITER(S): 213 SOLUTION TOPICAL at 17:37

## 2020-04-18 RX ADMIN — SENNA PLUS 10 MILLILITER(S): 8.6 TABLET ORAL at 22:00

## 2020-04-18 RX ADMIN — Medication 4: at 23:40

## 2020-04-18 RX ADMIN — Medication 40 MILLIEQUIVALENT(S): at 15:30

## 2020-04-18 RX ADMIN — AMLODIPINE BESYLATE 10 MILLIGRAM(S): 2.5 TABLET ORAL at 05:38

## 2020-04-18 RX ADMIN — Medication 25 MILLIGRAM(S): at 23:37

## 2020-04-18 RX ADMIN — Medication 4: at 12:09

## 2020-04-18 RX ADMIN — CHLORHEXIDINE GLUCONATE 15 MILLILITER(S): 213 SOLUTION TOPICAL at 05:03

## 2020-04-18 RX ADMIN — Medication 4: at 05:15

## 2020-04-18 RX ADMIN — MEROPENEM 100 MILLIGRAM(S): 1 INJECTION INTRAVENOUS at 09:18

## 2020-04-18 RX ADMIN — Medication 50 MILLIGRAM(S): at 12:09

## 2020-04-18 RX ADMIN — FAMOTIDINE 20 MILLIGRAM(S): 10 INJECTION INTRAVENOUS at 12:09

## 2020-04-18 RX ADMIN — Medication 1300 MILLIGRAM(S): at 12:09

## 2020-04-18 RX ADMIN — Medication 1 DROP(S): at 17:36

## 2020-04-18 RX ADMIN — INSULIN GLARGINE 8 UNIT(S): 100 INJECTION, SOLUTION SUBCUTANEOUS at 22:01

## 2020-04-18 RX ADMIN — Medication 25 MILLIGRAM(S): at 19:02

## 2020-04-18 RX ADMIN — INSULIN GLARGINE 8 UNIT(S): 100 INJECTION, SOLUTION SUBCUTANEOUS at 08:28

## 2020-04-18 RX ADMIN — Medication 4: at 17:36

## 2020-04-18 RX ADMIN — CARVEDILOL PHOSPHATE 25 MILLIGRAM(S): 80 CAPSULE, EXTENDED RELEASE ORAL at 17:37

## 2020-04-18 RX ADMIN — Medication 1 DROP(S): at 05:39

## 2020-04-18 RX ADMIN — Medication 10 MILLIGRAM(S): at 01:42

## 2020-04-18 RX ADMIN — Medication 1300 MILLIGRAM(S): at 22:01

## 2020-04-18 RX ADMIN — CARVEDILOL PHOSPHATE 25 MILLIGRAM(S): 80 CAPSULE, EXTENDED RELEASE ORAL at 05:38

## 2020-04-18 RX ADMIN — Medication 50 MILLIGRAM(S): at 05:37

## 2020-04-18 RX ADMIN — Medication 1300 MILLIGRAM(S): at 05:38

## 2020-04-18 RX ADMIN — Medication 100 MICROGRAM(S): at 22:00

## 2020-04-18 NOTE — PROGRESS NOTE PEDS - ATTENDING COMMENTS
Physical Exam 4/18/2020 11:00am  Appearance: Intubated but alert and on CPAP (sedation held)  Eyes: [ x] PERRL [x ] EOMI  HEENT: Intubated  [x ]NC/AT   Cardiovascular: [x ] S1 [x ] S2 [ x] RRR [ x] No m/r/g   1+ edema in all 4 extremities.   Respiratory: bilateral crackles  Gastrointestinal: Obese abdomen,  [x ] Soft [x ] Non-tender [x ] Non-distended [x ] BS+  Neurologic: Able to follow commands and move all 4 extremities but overall weak (weakness is worse in right upper and right lower extremity compared to the left)  Lymphatic: [x ] No lymphadenopathy  Skin: [x ] No rashes [x ] No cyanosis    Plan for CT head to assess for any intracranial abnormality due to weakness being worse on the right upper and right lower extremity compared to the left.     Off AC due to Hg drop (7.4). Transfused with 1 unit of PRBC. Will repeat H and H.     Plan for renal biopsy as per renal. Probably on Tuesday 4/21.     Tolerated CPAP today but she is still very weak. Will reassess for possible extubation tomorrow if otherwise stable.

## 2020-04-18 NOTE — PROGRESS NOTE PEDS - ASSESSMENT
Patient is a 65y old  Female who presents with a chief complaint of SOB AND HYPOXIA      Neuro   - restarted precedex for anxiety  - Monitor for mental status       CV  - 20-Asa held for plan of renal BX on next Tuesday   - Coreg 25 mg BID  - Hydralazine 50mg [id number] and norvasc 10mg OGT with holding parameter  - hydralazine 10 mg IV q6 PRN MAP>100  -: Clondine 0.1 x1 for HTN/ Tachy     Pulmonary  - AC/VC 24/380/5/30%  - AB.44/38/96/26   98%  - Repeat CPAP trial  today   - CXR- unchanged b/l opacities    GI  - Nepro bolus feed 180ml Q 6 hours    - pepcid 20mg IV QD  - -218 mg/dl   - Last BM on 4/15, lactulose added  -Will send Occult stool if BM 2/2 h/h downtrend      - Renal plan for bx on , likely HD on Monday  - Making urine   - Will hold HD today as per renal   - Phoslo stopped    Heme   -h/h: 7.4/23.9  -1 Unit PRBC given 6.3/20.8--->8.1/25  -Heparin gtt d/c 2/2 drop h/h  -Holding all AC         ID  -Afebrile   -WBC downtrending 7.73  - Cx sent blood from [facility] and pripheral urine culture and UA-NGTD  -Vanco level 12.9  500mg QD ***last dose  8p  - Cntinue meropenum for empiric line sepsis (end date )  -Soulmed d/c    - s/p anakinra (4/10-)  -  solumedrol 50mg IV q 8hrs (until )  restart for 5 more day   -  ACTEMRA  400mg IVPB x 1 given (d/w Dr Foote ID)   - completed plaquenil   - repeat COVID test sent  - still positive      ENDO  - BG in the 200, will increase lantus 8u BID  - continue HISS  - IV synthroid      Lines   - R IJ shiley (), L radial A-line  - L  IJ  triple lumen (4/3-) Patient is a 65y old  Female who presents with a chief complaint of SOB AND HYPOXIA      Neuro   - initially restarted precedex for anxiety overnight.  Will hold for now  - Monitor for mental status       CV  - 20-Asa held for plan of renal BX on next Tuesday   - Coreg 25 mg BID  - Hydralazine 50mg Z5yetaa and norvasc 10mg OGT with holding parameter  - hydralazine 10 mg IV q6 PRN MAP>100  -: Clondine 0.1 x1 for HTN/ Tachy   - BP now stable.    Pulmonary  - AC/VC 24/380/5/30%  - AB.42/43/143/28   100%  - Repeat CPAP trial  today  - Repeat ABG on CPAP  - CXR- unchanged b/l opacities    GI  - Nepro bolus feed 180ml Q 6 hours    - pepcid 20mg IV QD  - -218 mg/dl   - Last BM on 4/15, lactulose added  -Will send Occult stool if BM 2/2 h/h downtrend      - Renal plan for bx on , likely HD on Monday  - Making urine   - Will hold HD today as per renal   - Phoslo stopped    Heme   -h/h: 7.6/23.7  -1 Unit PRBC given 6.3/20.8--->8.1/25--->7.4/23.9  -Heparin gtt d/c 2/2 drop h/h  - H/H stable today, will restart Heparin Sq q8h and perform FOB.  Will need to hold for renal biopsy next week        ID  -Afebrile   -WBC downtrending 7.73  - Cx sent blood from [facility] and pripheral urine culture and UA-NGTD  -Vanco level 12.9 ( @0424)  last dose of vancomycin 500mg QD given  @  - Random Vanco level this evening.    - Continue meropenum for empiric line sepsis (end date )  -Soulmed d/c    - s/p anakinra (4/10-)  -  solumedrol 50mg IV q 8hrs (until )  restart for 5 more day   -  ACTEMRA  400mg IVPB x 1 given (d/w Dr Foote ID)   - completed plaquenil   - repeat COVID test sent  - still positive      ENDO  - BG in the 200, will increase lantus 8u BID  - continue HISS  - IV synthroid      Lines   - R IJ moni (), L radial A-line  - L  IJ  triple lumen (4/3-)

## 2020-04-18 NOTE — PROGRESS NOTE PEDS - SUBJECTIVE AND OBJECTIVE BOX
Patient is a 65y old  Female who presents with a chief complaint of SOB AND HYPOXIA      SUBJECTIVE / OVERNIGHT EVENTS:  No acute overnight events    ADDITIONAL REVIEW OF SYSTEMS:  Pt intubated an/or sedated unable to obtain ROS    MEDICATIONS  (STANDING):  amLODIPine   Tablet 10 milliGRAM(s) Oral daily  artificial  tears Solution 1 Drop(s) Both EYES two times a day  carvedilol 25 milliGRAM(s) Oral every 12 hours  chlorhexidine 0.12% Liquid 15 milliLiter(s) Oral Mucosa every 12 hours  dexMEDEtomidine Infusion 0.1 MICROgram(s)/kG/Hr (2.5 mL/Hr) IV Continuous <Continuous>  dextrose 5%. 1000 milliLiter(s) (50 mL/Hr) IV Continuous <Continuous>  dextrose 50% Injectable 12.5 Gram(s) IV Push once  dextrose 50% Injectable 25 Gram(s) IV Push once  dextrose 50% Injectable 25 Gram(s) IV Push once  famotidine Injectable 20 milliGRAM(s) IV Push daily  hydrALAZINE 50 milliGRAM(s) Oral every 6 hours  insulin glargine Injectable (LANTUS) 8 Unit(s) SubCutaneous two times a day  insulin lispro (HumaLOG) corrective regimen sliding scale   SubCutaneous every 6 hours  levothyroxine Injectable 100 MICROGram(s) IV Push at bedtime  meropenem  IVPB 500 milliGRAM(s) IV Intermittent every 24 hours  senna Syrup 10 milliLiter(s) Oral at bedtime  sodium bicarbonate 1300 milliGRAM(s) Oral three times a day    MEDICATIONS  (PRN):  acetaminophen    Suspension .. 975 milliGRAM(s) Enteral Tube every 6 hours PRN Temp greater or equal to 38C (100.4F)  dextrose 40% Gel 15 Gram(s) Oral once PRN Blood Glucose LESS THAN 70 milliGRAM(s)/deciliter  glucagon  Injectable 1 milliGRAM(s) IntraMuscular once PRN Glucose LESS THAN 70 milligrams/deciliter  hydrALAZINE Injectable 10 milliGRAM(s) IV Push every 6 hours PRN SBP>170    CAPILLARY BLOOD GLUCOSE      POCT Blood Glucose.: 245 mg/dL (2020 22:09)  POCT Blood Glucose.: 205 mg/dL (2020 17:06)  POCT Blood Glucose.: 165 mg/dL (2020 12:19)  POCT Blood Glucose.: 229 mg/dL (2020 04:10)    I&O's Summary    2020 07:01  -  2020 07:00  --------------------------------------------------------  IN: 1088.1 mL / OUT: 2170 mL / NET: -1081.9 mL    2020 07:01  -  2020 01:00  --------------------------------------------------------  IN: 500 mL / OUT: 1600 mL / NET: -1100 mL          Vital Signs Last 24 Hrs  T(C): 36.7 (2020 20:00), Max: 36.7 (2020 08:00)  T(F): 98.1 (2020 20:00), Max: 98.1 (2020 08:00)  HR: 63 (2020 00:00) (63 - 90)  BP: --  BP(mean): --  RR: 12 (2020 20:00) (10 - 26)  SpO2: 98% (2020 00:00) (97% - 100%)    LABS:                        7.6    7.73  )-----------( 241      ( 2020 00:32 )             23.7     04-17    140  |  98  |  101<H>  ----------------------------<  205<H>  3.5   |  25  |  4.31<H>    Ca    9.0      2020 17:53  Phos  4.0     04-17  Mg     2.3     04-17    TPro  5.4<L>  /  Alb  2.3<L>  /  TBili  0.4  /  DBili  x   /  AST  145<H>  /  ALT  180<H>  /  AlkPhos  881<H>  04-17    PT/INR - ( 2020 03:12 )   PT: 11.9 sec;   INR: 1.03 ratio         PTT - ( 2020 03:12 )  PTT:52.7 sec  CARDIAC MARKERS ( 2020 03:12 )  x     / x     / 25 U/L / x     / x          Urinalysis Basic - ( 2020 11:45 )    Color: Yellow / Appearance: Clear / S.017 / pH: x  Gluc: x / Ketone: Negative  / Bili: Negative / Urobili: Negative   Blood: x / Protein: 300 mg/dL / Nitrite: Negative   Leuk Esterase: Negative / RBC: 6 /hpf / WBC 25 /HPF   Sq Epi: x / Non Sq Epi: 1 /hpf / Bacteria: Negative        Culture - Blood (collected 2020 14:11)  Source: .Blood Dialysis Catheter  Preliminary Report (2020 15:02):    No growth to date.    Culture - Blood (collected 2020 14:11)  Source: .Blood Blood-Peripheral  Preliminary Report (2020 15:02):    No growth to date.    Culture - Urine (collected 2020 14:07)  Source: .Urine Catheterized  Final Report (2020 10:11):    No growth      COVID-19 PCR: Detected (2020 18:00)

## 2020-04-18 NOTE — PROGRESS NOTE ADULT - SUBJECTIVE AND OBJECTIVE BOX
[facility] DIVISION OF KIDNEY DISEASES AND HYPERTENSION -- FOLLOW UP NOTE  --------------------------------------------------------------------------------  Lm De La Cruz   Nephrology Fellow  Pager NS: [phone]/ [facility]: [phone]  (After 5 pm or on weekends please page the on-call fellow)    24 hour events/subjective: Patient vital signs, labs, medications reviewed PEEP 5, FIO2 30%. 2.25L of UOP in 24hrs        PAST HISTORY  --------------------------------------------------------------------------------  No significant changes to PMH, PSH, FHx, SHx, unless otherwise noted    ALLERGIES & MEDICATIONS  --------------------------------------------------------------------------------  Allergies    No Known Allergies    Intolerances      Standing Inpatient Medications  amLODIPine   Tablet 10 milliGRAM(s) Oral daily  artificial  tears Solution 1 Drop(s) Both EYES two times a day  carvedilol 25 milliGRAM(s) Oral every 12 hours  chlorhexidine 0.12% Liquid 15 milliLiter(s) Oral Mucosa every 12 hours  dextrose 5%. 1000 milliLiter(s) IV Continuous <Continuous>  dextrose 50% Injectable 12.5 Gram(s) IV Push once  dextrose 50% Injectable 25 Gram(s) IV Push once  dextrose 50% Injectable 25 Gram(s) IV Push once  famotidine Injectable 20 milliGRAM(s) IV Push daily  hydrALAZINE 25 milliGRAM(s) Oral every 6 hours  insulin glargine Injectable (LANTUS) 8 Unit(s) SubCutaneous two times a day  insulin lispro (HumaLOG) corrective regimen sliding scale   SubCutaneous every 6 hours  levothyroxine Injectable 100 MICROGram(s) IV Push at bedtime  meropenem  IVPB 500 milliGRAM(s) IV Intermittent every 24 hours  senna Syrup 10 milliLiter(s) Oral at bedtime  sodium bicarbonate 1300 milliGRAM(s) Oral three times a day    PRN Inpatient Medications  acetaminophen    Suspension .. 975 milliGRAM(s) Enteral Tube every 6 hours PRN  dextrose 40% Gel 15 Gram(s) Oral once PRN  glucagon  Injectable 1 milliGRAM(s) IntraMuscular once PRN  hydrALAZINE Injectable 10 milliGRAM(s) IV Push every 6 hours PRN      REVIEW OF SYSTEMS  --------------------------------------------------------------------------------  unable to obtain ROS    >>> <<<    VITALS/PHYSICAL EXAM  --------------------------------------------------------------------------------  T(C): 36.8 (04-18-20 @ 12:00), Max: 36.8 (04-18-20 @ 12:00)  HR: 59 (04-18-20 @ 12:00) (59 - 75)  BP: --  RR: 13 (04-18-20 @ 12:00) (12 - 25)  SpO2: 98% (04-18-20 @ 12:00) (92% - 100%)  Wt(kg): --        04-17-20 @ 07:01  -  04-18-20 @ 07:00  --------------------------------------------------------  IN: 930 mL / OUT: 2250 mL / NET: -1320 mL    04-18-20 @ 07:01  -  04-18-20 @ 13:09  --------------------------------------------------------  IN: 260 mL / OUT: 240 mL / NET: 20 mL      Physical exam deferred due to COVID19 and preservation of PPE      LABS/STUDIES  --------------------------------------------------------------------------------              7.6    7.73  >-----------<  241      [04-18-20 @ 00:32]              23.7     140  |  98  |  103  ----------------------------<  244      [04-18-20 @ 00:32]  3.4   |  24  |  4.47        Ca     9.1     [04-18-20 @ 00:32]      Mg     2.4     [04-18-20 @ 00:32]      Phos  5.4     [04-18-20 @ 00:32]    TPro  5.5  /  Alb  2.3  /  TBili  0.4  /  DBili  0.3  /  AST  215  /  ALT  196  /  AlkPhos  934  [04-18-20 @ 00:32]    PT/INR: PT 10.7 , INR 0.94       [04-18-20 @ 00:32]  PTT: 52.7       [04-17-20 @ 03:12]    Uric acid 7.0      [04-17-20 @ 03:14]  CK 25      [04-17-20 @ 03:12]        [04-17-20 @ 03:12]    Creatinine Trend:  SCr 4.47 [04-18 @ 00:32]  SCr 4.31 [04-17 @ 17:53]  SCr 4.35 [04-17 @ 03:12]  SCr 4.39 [04-16 @ 15:51]  SCr 4.52 [04-16 @ 03:08]    Urinalysis - [04-16-20 @ 11:45]      Color Yellow / Appearance Clear / SG 1.017 / pH 6.5      Gluc 500 mg/dL / Ketone Negative  / Bili Negative / Urobili Negative       Blood Negative / Protein 300 mg/dL / Leuk Est Negative / Nitrite Negative      RBC 6 / WBC 25 / Hyaline 4 / Gran  / Sq Epi  / Non Sq Epi 1 / Bacteria Negative    Urine Creatinine 36      [04-14-20 @ 13:23]  Urine Protein 354      [04-14-20 @ 13:23]    Ferritin 2292      [04-16-20 @ 04:32]  TSH 4.06      [04-14-20 @ 16:12]  Lipid: chol --, , HDL --, LDL --      [04-17-20 @ 03:12]    HBsAb <3.0      [04-10-20 @ 00:19]  HBsAg Nonreact      [04-10-20 @ 00:19]  HBcAb Nonreact      [04-10-20 @ 00:19]  HCV 0.14, Nonreact      [04-10-20 @ 00:19]    C3 Complement 104      [04-14-20 @ 15:58]  C4 Complement 15      [04-14-20 @ 15:58]

## 2020-04-18 NOTE — PROGRESS NOTE PEDS - NSHPATTENDINGPLANDISCUSS_GEN_ALL_CORE
The patient's nurse and physician assistant (ASHVIN Montiel). the patient's nurse and physician assistant (ASHVIN Montiel).

## 2020-04-19 LAB
ALBUMIN SERPL ELPH-MCNC: 2.2 G/DL — LOW (ref 3.3–5)
ALP SERPL-CCNC: 944 U/L — HIGH (ref 40–120)
ALT FLD-CCNC: 218 U/L — HIGH (ref 10–45)
ANION GAP SERPL CALC-SCNC: 15 MMOL/L — SIGNIFICANT CHANGE UP (ref 5–17)
ANION GAP SERPL CALC-SCNC: 17 MMOL/L — SIGNIFICANT CHANGE UP (ref 5–17)
APTT BLD: 140.7 SEC — CRITICAL HIGH (ref 27.5–36.3)
APTT BLD: 37.1 SEC — HIGH (ref 27.5–36.3)
AST SERPL-CCNC: 226 U/L — HIGH (ref 10–40)
BASE EXCESS BLDA CALC-SCNC: 3.6 MMOL/L — HIGH (ref -2–2)
BASE EXCESS BLDA CALC-SCNC: 4.4 MMOL/L — HIGH (ref -2–2)
BILIRUB SERPL-MCNC: 0.3 MG/DL — SIGNIFICANT CHANGE UP (ref 0.2–1.2)
BUN SERPL-MCNC: 102 MG/DL — HIGH (ref 7–23)
BUN SERPL-MCNC: 104 MG/DL — HIGH (ref 7–23)
CALCIUM SERPL-MCNC: 8.8 MG/DL — SIGNIFICANT CHANGE UP (ref 8.4–10.5)
CALCIUM SERPL-MCNC: 8.8 MG/DL — SIGNIFICANT CHANGE UP (ref 8.4–10.5)
CHLORIDE SERPL-SCNC: 101 MMOL/L — SIGNIFICANT CHANGE UP (ref 96–108)
CHLORIDE SERPL-SCNC: 99 MMOL/L — SIGNIFICANT CHANGE UP (ref 96–108)
CO2 BLDA-SCNC: 29 MMOL/L — SIGNIFICANT CHANGE UP (ref 22–30)
CO2 BLDA-SCNC: 30 MMOL/L — SIGNIFICANT CHANGE UP (ref 22–30)
CO2 SERPL-SCNC: 25 MMOL/L — SIGNIFICANT CHANGE UP (ref 22–31)
CO2 SERPL-SCNC: 25 MMOL/L — SIGNIFICANT CHANGE UP (ref 22–31)
CREAT ?TM UR-MCNC: 46 MG/DL — SIGNIFICANT CHANGE UP
CREAT SERPL-MCNC: 4.41 MG/DL — HIGH (ref 0.5–1.3)
CREAT SERPL-MCNC: 4.57 MG/DL — HIGH (ref 0.5–1.3)
G6PD RBC-CCNC: 19.7 U/G HGB — SIGNIFICANT CHANGE UP (ref 7–20.5)
GAS PNL BLDA: SIGNIFICANT CHANGE UP
GLUCOSE BLDC GLUCOMTR-MCNC: 129 MG/DL — HIGH (ref 70–99)
GLUCOSE BLDC GLUCOMTR-MCNC: 148 MG/DL — HIGH (ref 70–99)
GLUCOSE BLDC GLUCOMTR-MCNC: 153 MG/DL — HIGH (ref 70–99)
GLUCOSE SERPL-MCNC: 152 MG/DL — HIGH (ref 70–99)
GLUCOSE SERPL-MCNC: 235 MG/DL — HIGH (ref 70–99)
HCO3 BLDA-SCNC: 28 MMOL/L — SIGNIFICANT CHANGE UP (ref 21–29)
HCO3 BLDA-SCNC: 29 MMOL/L — SIGNIFICANT CHANGE UP (ref 21–29)
HCT VFR BLD CALC: 23.2 % — LOW (ref 34.5–45)
HGB BLD-MCNC: 7.3 G/DL — LOW (ref 11.5–15.5)
HOROWITZ INDEX BLDA+IHG-RTO: 30 — SIGNIFICANT CHANGE UP
HOROWITZ INDEX BLDA+IHG-RTO: 30 — SIGNIFICANT CHANGE UP
INR BLD: 0.93 RATIO — SIGNIFICANT CHANGE UP (ref 0.88–1.16)
MAGNESIUM SERPL-MCNC: 2.5 MG/DL — SIGNIFICANT CHANGE UP (ref 1.6–2.6)
MCHC RBC-ENTMCNC: 26.5 PG — LOW (ref 27–34)
MCHC RBC-ENTMCNC: 31.5 GM/DL — LOW (ref 32–36)
MCV RBC AUTO: 84.4 FL — SIGNIFICANT CHANGE UP (ref 80–100)
NRBC # BLD: 0 /100 WBCS — SIGNIFICANT CHANGE UP (ref 0–0)
PCO2 BLDA: 43 MMHG — SIGNIFICANT CHANGE UP (ref 32–46)
PCO2 BLDA: 44 MMHG — SIGNIFICANT CHANGE UP (ref 32–46)
PH BLDA: 7.42 — SIGNIFICANT CHANGE UP (ref 7.35–7.45)
PH BLDA: 7.43 — SIGNIFICANT CHANGE UP (ref 7.35–7.45)
PHOSPHATE SERPL-MCNC: 5.1 MG/DL — HIGH (ref 2.5–4.5)
PLATELET # BLD AUTO: 228 K/UL — SIGNIFICANT CHANGE UP (ref 150–400)
PO2 BLDA: 136 MMHG — HIGH (ref 74–108)
PO2 BLDA: 183 MMHG — HIGH (ref 74–108)
POTASSIUM SERPL-MCNC: 3.1 MMOL/L — LOW (ref 3.5–5.3)
POTASSIUM SERPL-MCNC: 3.3 MMOL/L — LOW (ref 3.5–5.3)
POTASSIUM SERPL-SCNC: 3.1 MMOL/L — LOW (ref 3.5–5.3)
POTASSIUM SERPL-SCNC: 3.3 MMOL/L — LOW (ref 3.5–5.3)
PROCALCITONIN SERPL-MCNC: 0.39 NG/ML — HIGH (ref 0.02–0.1)
PROT ?TM UR-MCNC: 151 MG/DL — HIGH (ref 0–12)
PROT SERPL-MCNC: 5.1 G/DL — LOW (ref 6–8.3)
PROT/CREAT UR-RTO: 3.3 RATIO — HIGH (ref 0–0.2)
PROTHROM AB SERPL-ACNC: 10.7 SEC — SIGNIFICANT CHANGE UP (ref 10–12.9)
RBC # BLD: 2.75 M/UL — LOW (ref 3.8–5.2)
RBC # FLD: 15.8 % — HIGH (ref 10.3–14.5)
SAO2 % BLDA: 100 % — HIGH (ref 92–96)
SAO2 % BLDA: 100 % — HIGH (ref 92–96)
SODIUM SERPL-SCNC: 141 MMOL/L — SIGNIFICANT CHANGE UP (ref 135–145)
SODIUM SERPL-SCNC: 141 MMOL/L — SIGNIFICANT CHANGE UP (ref 135–145)
VANCOMYCIN FLD-MCNC: 18.8 UG/ML — SIGNIFICANT CHANGE UP
WBC # BLD: 9.57 K/UL — SIGNIFICANT CHANGE UP (ref 3.8–10.5)
WBC # FLD AUTO: 9.57 K/UL — SIGNIFICANT CHANGE UP (ref 3.8–10.5)

## 2020-04-19 PROCEDURE — 99291 CRITICAL CARE FIRST HOUR: CPT

## 2020-04-19 PROCEDURE — 99232 SBSQ HOSP IP/OBS MODERATE 35: CPT | Mod: GC

## 2020-04-19 PROCEDURE — 71045 X-RAY EXAM CHEST 1 VIEW: CPT | Mod: 26

## 2020-04-19 PROCEDURE — 70450 CT HEAD/BRAIN W/O DYE: CPT | Mod: 26

## 2020-04-19 RX ORDER — HEPARIN SODIUM 5000 [USP'U]/ML
8000 INJECTION INTRAVENOUS; SUBCUTANEOUS ONCE
Refills: 0 | Status: DISCONTINUED | OUTPATIENT
Start: 2020-04-19 | End: 2020-04-19

## 2020-04-19 RX ORDER — ASPIRIN/CALCIUM CARB/MAGNESIUM 324 MG
300 TABLET ORAL DAILY
Refills: 0 | Status: DISCONTINUED | OUTPATIENT
Start: 2020-04-19 | End: 2020-04-21

## 2020-04-19 RX ORDER — HEPARIN SODIUM 5000 [USP'U]/ML
8000 INJECTION INTRAVENOUS; SUBCUTANEOUS EVERY 6 HOURS
Refills: 0 | Status: DISCONTINUED | OUTPATIENT
Start: 2020-04-19 | End: 2020-04-23

## 2020-04-19 RX ORDER — HYDRALAZINE HCL 50 MG
10 TABLET ORAL ONCE
Refills: 0 | Status: COMPLETED | OUTPATIENT
Start: 2020-04-19 | End: 2020-04-19

## 2020-04-19 RX ORDER — HEPARIN SODIUM 5000 [USP'U]/ML
4000 INJECTION INTRAVENOUS; SUBCUTANEOUS EVERY 6 HOURS
Refills: 0 | Status: DISCONTINUED | OUTPATIENT
Start: 2020-04-19 | End: 2020-04-23

## 2020-04-19 RX ORDER — INSULIN LISPRO 100/ML
VIAL (ML) SUBCUTANEOUS
Refills: 0 | Status: DISCONTINUED | OUTPATIENT
Start: 2020-04-19 | End: 2020-04-23

## 2020-04-19 RX ORDER — HEPARIN SODIUM 5000 [USP'U]/ML
INJECTION INTRAVENOUS; SUBCUTANEOUS
Qty: 25000 | Refills: 0 | Status: DISCONTINUED | OUTPATIENT
Start: 2020-04-19 | End: 2020-04-20

## 2020-04-19 RX ORDER — INSULIN GLARGINE 100 [IU]/ML
8 INJECTION, SOLUTION SUBCUTANEOUS EVERY MORNING
Refills: 0 | Status: DISCONTINUED | OUTPATIENT
Start: 2020-04-19 | End: 2020-04-22

## 2020-04-19 RX ORDER — HYDRALAZINE HCL 50 MG
10 TABLET ORAL EVERY 6 HOURS
Refills: 0 | Status: DISCONTINUED | OUTPATIENT
Start: 2020-04-19 | End: 2020-04-23

## 2020-04-19 RX ORDER — ACETAMINOPHEN 500 MG
650 TABLET ORAL EVERY 6 HOURS
Refills: 0 | Status: DISCONTINUED | OUTPATIENT
Start: 2020-04-19 | End: 2020-04-22

## 2020-04-19 RX ORDER — ASPIRIN/CALCIUM CARB/MAGNESIUM 324 MG
81 TABLET ORAL DAILY
Refills: 0 | Status: DISCONTINUED | OUTPATIENT
Start: 2020-04-19 | End: 2020-04-19

## 2020-04-19 RX ADMIN — Medication 1 DROP(S): at 04:25

## 2020-04-19 RX ADMIN — CHLORHEXIDINE GLUCONATE 15 MILLILITER(S): 213 SOLUTION TOPICAL at 04:33

## 2020-04-19 RX ADMIN — Medication 1300 MILLIGRAM(S): at 04:25

## 2020-04-19 RX ADMIN — Medication 10 MILLIGRAM(S): at 14:53

## 2020-04-19 RX ADMIN — AMLODIPINE BESYLATE 10 MILLIGRAM(S): 2.5 TABLET ORAL at 04:29

## 2020-04-19 RX ADMIN — Medication 1 DROP(S): at 17:51

## 2020-04-19 RX ADMIN — Medication 25 MILLIGRAM(S): at 04:24

## 2020-04-19 RX ADMIN — Medication 1300 MILLIGRAM(S): at 11:18

## 2020-04-19 RX ADMIN — FAMOTIDINE 20 MILLIGRAM(S): 10 INJECTION INTRAVENOUS at 11:02

## 2020-04-19 RX ADMIN — Medication 2: at 05:24

## 2020-04-19 RX ADMIN — Medication 100 MICROGRAM(S): at 22:30

## 2020-04-19 RX ADMIN — HEPARIN SODIUM 1800 UNIT(S)/HR: 5000 INJECTION INTRAVENOUS; SUBCUTANEOUS at 17:52

## 2020-04-19 RX ADMIN — Medication 25 MILLIGRAM(S): at 11:17

## 2020-04-19 RX ADMIN — INSULIN GLARGINE 8 UNIT(S): 100 INJECTION, SOLUTION SUBCUTANEOUS at 11:03

## 2020-04-19 RX ADMIN — MEROPENEM 100 MILLIGRAM(S): 1 INJECTION INTRAVENOUS at 11:02

## 2020-04-19 RX ADMIN — CHLORHEXIDINE GLUCONATE 15 MILLILITER(S): 213 SOLUTION TOPICAL at 17:52

## 2020-04-19 RX ADMIN — CARVEDILOL PHOSPHATE 25 MILLIGRAM(S): 80 CAPSULE, EXTENDED RELEASE ORAL at 04:24

## 2020-04-19 NOTE — PROGRESS NOTE ADULT - ATTENDING COMMENTS
Physical Exam 4/19/2020 11:00am  Appearance: Intubated but alert and on CPAP (sedation held)  Eyes: [ x] PERRL [x ] EOMI  HEENT: Intubated  [x ]NC/AT   Cardiovascular: [x ] S1 [x ] S2 [ x] RRR [ x] No m/r/g   1+ edema in all 4 extremities.   Respiratory: bilateral crackles  Gastrointestinal: Obese abdomen,  [x ] Soft [x ] Non-tender [x ] Non-distended [x ] BS+  Neurologic: Able to follow commands and move all 4 extremities. Generalized weakness but symmetric  Lymphatic: [x ] No lymphadenopathy  Skin: [x ] No rashes [x ] No cyanosis    - Tolerating CPAP trial. Will extubate today  - CT head shows no acute changes. Will restart heparin for anticoagulation  - Creatinine overall stable. Renal is on board. Plan for biopsy cancelled at this point.   Making urine ~100cc/hr   - Will need intensive physical therapy

## 2020-04-19 NOTE — PROGRESS NOTE ADULT - SUBJECTIVE AND OBJECTIVE BOX
Patient is a 65y old  Female who presents with a chief complaint of SOB AND HYPOXIA    SUBJECTIVE / OVERNIGHT EVENTS: No acute overnight events     ADDITIONAL REVIEW OF SYSTEMS: Pt intubated and/or sedated unable to obtain ROS    MEDICATIONS  (STANDING):  amLODIPine   Tablet 10 milliGRAM(s) Oral daily  artificial  tears Solution 1 Drop(s) Both EYES two times a day  carvedilol 25 milliGRAM(s) Oral every 12 hours  chlorhexidine 0.12% Liquid 15 milliLiter(s) Oral Mucosa every 12 hours  dextrose 5%. 1000 milliLiter(s) (50 mL/Hr) IV Continuous <Continuous>  dextrose 50% Injectable 12.5 Gram(s) IV Push once  dextrose 50% Injectable 25 Gram(s) IV Push once  dextrose 50% Injectable 25 Gram(s) IV Push once  famotidine Injectable 20 milliGRAM(s) IV Push daily  hydrALAZINE 25 milliGRAM(s) Oral every 6 hours  insulin glargine Injectable (LANTUS) 8 Unit(s) SubCutaneous two times a day  insulin lispro (HumaLOG) corrective regimen sliding scale   SubCutaneous every 6 hours  levothyroxine Injectable 100 MICROGram(s) IV Push at bedtime  meropenem  IVPB 500 milliGRAM(s) IV Intermittent every 24 hours  senna Syrup 10 milliLiter(s) Oral at bedtime  sodium bicarbonate 1300 milliGRAM(s) Oral three times a day    MEDICATIONS  (PRN):  acetaminophen    Suspension .. 975 milliGRAM(s) Enteral Tube every 6 hours PRN Temp greater or equal to 38C (100.4F)  dextrose 40% Gel 15 Gram(s) Oral once PRN Blood Glucose LESS THAN 70 milliGRAM(s)/deciliter  glucagon  Injectable 1 milliGRAM(s) IntraMuscular once PRN Glucose LESS THAN 70 milligrams/deciliter  hydrALAZINE Injectable 10 milliGRAM(s) IV Push every 6 hours PRN SBP>170    CAPILLARY BLOOD GLUCOSE      POCT Blood Glucose.: 230 mg/dL (18 Apr 2020 23:35)  POCT Blood Glucose.: 246 mg/dL (18 Apr 2020 17:35)  POCT Blood Glucose.: 228 mg/dL (18 Apr 2020 12:08)  POCT Blood Glucose.: 259 mg/dL (18 Apr 2020 08:28)    I&O's Summary    17 Apr 2020 07:01  -  18 Apr 2020 07:00  --------------------------------------------------------  IN: 930 mL / OUT: 2250 mL / NET: -1320 mL    18 Apr 2020 07:01  -  19 Apr 2020 04:37  --------------------------------------------------------  IN: 490 mL / OUT: 1035 mL / NET: -545 mL        PHYSICAL EXAM:  Vital Signs Last 24 Hrs  T(C): 36.6 (19 Apr 2020 04:00), Max: 36.8 (18 Apr 2020 12:00)  T(F): 97.9 (19 Apr 2020 04:00), Max: 98.2 (18 Apr 2020 12:00)  HR: 64 (19 Apr 2020 04:00) (59 - 65)  BP: --  BP(mean): --  RR: 28 (19 Apr 2020 04:00) (13 - 28)  SpO2: 100% (19 Apr 2020 04:00) (95% - 100%)      LABS:                        7.3    9.57  )-----------( 228      ( 19 Apr 2020 00:43 )             23.2     04-19    141  |  99  |  102<H>  ----------------------------<  235<H>  3.3<L>   |  25  |  4.57<H>    Ca    8.8      19 Apr 2020 00:43  Phos  5.1     04-19  Mg     2.5     04-19    TPro  5.1<L>  /  Alb  2.2<L>  /  TBili  0.3  /  DBili  0.2  /  AST  226<H>  /  ALT  218<H>  /  AlkPhos  944<H>  04-19    PT/INR - ( 19 Apr 2020 00:43 )   PT: 10.7 sec;   INR: 0.93 ratio         PTT - ( 19 Apr 2020 00:43 )  PTT:37.1 sec          Culture - Blood (collected 16 Apr 2020 14:11)  Source: .Blood Dialysis Catheter  Preliminary Report (17 Apr 2020 15:02):    No growth to date.    Culture - Blood (collected 16 Apr 2020 14:11)  Source: .Blood Blood-Peripheral  Preliminary Report (17 Apr 2020 15:02):    No growth to date.    Culture - Urine (collected 16 Apr 2020 14:07)  Source: .Urine Catheterized  Final Report (17 Apr 2020 10:11):    No growth      COVID-19 PCR: Detected (12 Apr 2020 18:00)

## 2020-04-19 NOTE — PROGRESS NOTE ADULT - ASSESSMENT
65F PMHx DM2, HTN, HLD who present to ED for fever/dyspnea x 1 week.  Per HPI, pt desaturated at home O2 sat 70% which improved nasal cannula (another family member).  In ED hypoxia 70s on nasal cannula s/p intubation.  Covid positive with CXR b/l opacities. Given ceftriaxone, azithromycin, hydroxychlorquine.     Nephrology consulted for MARIO or MARIO on CKD.     MARIO/ Proteinuria    in setting septic shock Covid19 infection, low BP, also noted to have nephrotic range proteinuria  - s/p HD on 4/13, has stable labs since then, non-oliguric. No plan for HD today, will continue to reassess the need for RRT on daily basis  - Can hold off on biopsy. She has history of proteinuria on past labs  - Monitor labs and urine output. Avoid NSAIDs, ACEI/ARBS, RCA and nephrotoxins. Dose vancomycin per level.      Metabolic acidosis   in the setting of MARIO   continue oral bicarbonate    Hyperphosphatemia  in the setting of MARIO   continue Ca acetate  Monitor serum phosphorus    Hypocalcemia   in the setting of hyperphosphatemia and hypoalbuminemia  - continue calcium acetate  - monitor ionized calcium, replete to keep >1.1

## 2020-04-19 NOTE — AIRWAY REMOVAL NOTE  ADULT & PEDS - ARTIFICAL AIRWAY REMOVAL COMMENTS
order verified, pt identified. pt Written order for extubation verified. The patient was identified by full name and birth date compared to the identification band. Present during the procedure was

## 2020-04-19 NOTE — PROGRESS NOTE ADULT - ASSESSMENT
Patient is a 65y old  Female who presents with a chief complaint of SOB AND HYPOXIA      Neuro   - restarted precedex for anxiety last night, held today  - Monitor for mental status  - Slight Right sided weakness  - Head CT pending      CV  - 20-Asa held for plan of renal BX on next Tuesday   - Coreg 25 mg BID  - Hydralazine 25mg [id number] (down from 50mgs) and norvasc 10mg OGT with holding parameter  - hydralazine 10 mg IV q6 PRN MAP>100  -: Clondine 0.1 x1 for HTN/ Tachy     Pulmonary  - AC/VC 24/380/5/30%  - AB.44/38/96/26   98%  - Tolerated CPAP trial well  - CXR- unchanged b/l opacities    GI  - Nepro bolus feed 180ml Q 6 hours    - pepcid 20mg IV QD  - -218 mg/dl   - Last BM on 4/15, lactulose added  -Will send Occult stool if BM 2/2 h/h downtrend      - Renal plan for bx on , likely HD on Monday  - Making urine   - Will hold HD today as per renal   - Phoslo stopped    Heme   -h/h: 7.7/24  -1 Unit PRBC given 6.3/20.8--->8.1/25--->7.4  -Heparin gtt d/c 2/2 drop h/h  -Hold AC pending head CT.  if negative start Heparin SQ        ID  -Afebrile   -WBC stable  - [facility] and pripheral urine culture and UA-NGTD  -Vanco level 12.9  500mg QD ***last dose  8p  - Repeat vanco level at 8p today and dose vanco   - Cntinue meropenum for empiric line sepsis (end date )  -Soulmed d/c    - s/p anakinra (4/10-)  -  solumedrol 50mg IV q 8hrs (until )  restart for 5 more day   -  ACTEMRA  400mg IVPB x 1 given (d/w Dr Foote ID)   - completed plaquenil   - repeat COVID test sent  - still positive      ENDO  - BG in the 200, lantus 8u BID  - continue HISS  - IV synthroid      Lines   - R IJ shiley (), L radial A-line  - L  IJ  triple lumen (4/3-) Patient is a 65y old  Female who presents with a chief complaint of SOB AND HYPOXIA      Neuro   -  Awake, extubated,   - Mental status- awake follows commands, talks "I have a sore throat"  - Slight Right sided weakness -CTH- No S/H/M    CV  - Asa resumed as renal BX cancelled by renal  - Coreg and Norvasc held-NPO until pass swallow eval  - Hydralazine  changed to IV as she is NPO    Pulmonary  - extubated, O2 via n/c 5L 100 %  - AB.43/44/136/29   100%-before extubated  - Tolerated CPAP trial well  - CXR- unchanged b/l opacities    GI  - Nepro bolus feed 180ml Q 6 hours  -HELD -extubated, bedside swallow eval in am  - pepcid 20mg IV QD  - Last BM on 4/15, lactulose added      - Renal cancelled biopsy   - Making urine . Net -665  - Will hold HD today as per renal   - Phoslo stopped  -keep inonized calcium >1.1. 1.1()    Heme   -h/h: 7.1/ 23.2 stable   -Heparin gtt     ID  -Afebrile   -WBC stable, procal 0.39, lactate 0.8  - Cx sent blood from [facility] and pripheral urine culture and UA-NGTD  -Vanco dose per level for last dose today.  - completed Meropenum last dose was today  (end date )  Solumedrol d/c    - s/p anakinra (4/10-)  - completed solumedrol 50mg IV q 8hrs (until )  restart for 5 more day   -  ACTEMRA  400mg IVPB x 1 given (d/w Dr Foote ID)   - completed Plaquenil   - repeat COVID test sent  - still positive      ENDO  - , 129 today, NPO s/p extubation, try swallow eval in am. Lantus from 8u BID to      daily to prevent hypoglycemia   -Low continue HISS  - IV synthroid      Lines   - R IJ moni (), L radial A-line  - L  IJ  triple lumen (4/3-)

## 2020-04-19 NOTE — PROGRESS NOTE ADULT - SUBJECTIVE AND OBJECTIVE BOX
[facility] Division of Kidney Diseases & Hypertension  FOLLOW UP NOTE  --------------------------------------------------------------------------------  Chief Complaint:    24 hour events/subjective:    Remains off HD    PAST HISTORY  --------------------------------------------------------------------------------  No significant changes to PMH, PSH, FHx, SHx, unless otherwise noted    ALLERGIES & MEDICATIONS  --------------------------------------------------------------------------------  Allergies    No Known Allergies    Intolerances      Standing Inpatient Medications  amLODIPine   Tablet 10 milliGRAM(s) Oral daily  artificial  tears Solution 1 Drop(s) Both EYES two times a day  carvedilol 25 milliGRAM(s) Oral every 12 hours  chlorhexidine 0.12% Liquid 15 milliLiter(s) Oral Mucosa every 12 hours  dextrose 5%. 1000 milliLiter(s) IV Continuous <Continuous>  dextrose 50% Injectable 12.5 Gram(s) IV Push once  dextrose 50% Injectable 25 Gram(s) IV Push once  dextrose 50% Injectable 25 Gram(s) IV Push once  famotidine Injectable 20 milliGRAM(s) IV Push daily  heparin   Injectable 8000 Unit(s) IV Push once  heparin  Infusion.  Unit(s)/Hr IV Continuous <Continuous>  hydrALAZINE 25 milliGRAM(s) Oral every 6 hours  insulin glargine Injectable (LANTUS) 8 Unit(s) SubCutaneous two times a day  insulin lispro (HumaLOG) corrective regimen sliding scale   SubCutaneous every 6 hours  levothyroxine Injectable 100 MICROGram(s) IV Push at bedtime  meropenem  IVPB 500 milliGRAM(s) IV Intermittent every 24 hours  senna Syrup 10 milliLiter(s) Oral at bedtime  sodium bicarbonate 1300 milliGRAM(s) Oral three times a day    PRN Inpatient Medications  acetaminophen    Suspension .. 975 milliGRAM(s) Enteral Tube every 6 hours PRN  dextrose 40% Gel 15 Gram(s) Oral once PRN  glucagon  Injectable 1 milliGRAM(s) IntraMuscular once PRN  heparin   Injectable 8000 Unit(s) IV Push every 6 hours PRN  heparin   Injectable 4000 Unit(s) IV Push every 6 hours PRN  hydrALAZINE Injectable 10 milliGRAM(s) IV Push every 6 hours PRN      REVIEW OF SYSTEMS  --------------------------------------------------------------------------------  unable to obtain    VITALS/PHYSICAL EXAM  --------------------------------------------------------------------------------  T(C): 36.6 (04-19-20 @ 04:00), Max: 36.8 (04-18-20 @ 12:00)  HR: 60 (04-19-20 @ 10:12) (59 - 65)  BP: --  RR: 17 (04-19-20 @ 08:00) (13 - 28)  SpO2: 100% (04-19-20 @ 10:12) (97% - 100%)  Wt(kg): --        04-18-20 @ 07:01  -  04-19-20 @ 07:00  --------------------------------------------------------  IN: 490 mL / OUT: 1155 mL / NET: -665 mL      Physical Exam:  deferred due to preservation of PPE    LABS/STUDIES  --------------------------------------------------------------------------------              7.3    9.57  >-----------<  228      [04-19-20 @ 00:43]              23.2     141  |  99  |  102  ----------------------------<  235      [04-19-20 @ 00:43]  3.3   |  25  |  4.57        Ca     8.8     [04-19-20 @ 00:43]      Mg     2.5     [04-19-20 @ 00:43]      Phos  5.1     [04-19-20 @ 00:43]    TPro  5.1  /  Alb  2.2  /  TBili  0.3  /  DBili  0.2  /  AST  226  /  ALT  218  /  AlkPhos  944  [04-19-20 @ 00:43]    PT/INR: PT 10.7 , INR 0.93       [04-19-20 @ 00:43]  PTT: 37.1       [04-19-20 @ 00:43]      Creatinine Trend:  SCr 4.57 [04-19 @ 00:43]  SCr 4.54 [04-18 @ 15:54]  SCr 4.47 [04-18 @ 00:32]  SCr 4.31 [04-17 @ 17:53]  SCr 4.35 [04-17 @ 03:12]    Urinalysis - [04-16-20 @ 11:45]      Color Yellow / Appearance Clear / SG 1.017 / pH 6.5      Gluc 500 mg/dL / Ketone Negative  / Bili Negative / Urobili Negative       Blood Negative / Protein 300 mg/dL / Leuk Est Negative / Nitrite Negative      RBC 6 / WBC 25 / Hyaline 4 / Gran  / Sq Epi  / Non Sq Epi 1 / Bacteria Negative    Urine Creatinine 46      [04-19-20 @ 00:43]  Urine Protein 151      [04-19-20 @ 00:43]    Ferritin 2292      [04-16-20 @ 04:32]  TSH 4.06      [04-14-20 @ 16:12]  Lipid: chol --, , HDL --, LDL --      [04-17-20 @ 03:12]      C3 Complement 104      [04-14-20 @ 15:58]  C4 Complement 15      [04-14-20 @ 15:58]

## 2020-04-20 LAB
4/8 RATIO: 2.1 RATIO — SIGNIFICANT CHANGE UP (ref 0.9–3.6)
ABS CD8: 211 /UL — SIGNIFICANT CHANGE UP (ref 142–740)
ALBUMIN SERPL ELPH-MCNC: 2.2 G/DL — LOW (ref 3.3–5)
ALBUMIN SERPL ELPH-MCNC: 2.3 G/DL — LOW (ref 3.3–5)
ALP SERPL-CCNC: 847 U/L — HIGH (ref 40–120)
ALP SERPL-CCNC: 881 U/L — HIGH (ref 40–120)
ALT FLD-CCNC: 134 U/L — HIGH (ref 10–45)
ALT FLD-CCNC: 154 U/L — HIGH (ref 10–45)
ANION GAP SERPL CALC-SCNC: 17 MMOL/L — SIGNIFICANT CHANGE UP (ref 5–17)
ANION GAP SERPL CALC-SCNC: 17 MMOL/L — SIGNIFICANT CHANGE UP (ref 5–17)
APTT BLD: 137.3 SEC — CRITICAL HIGH (ref 27.5–36.3)
APTT BLD: 182.9 SEC — CRITICAL HIGH (ref 27.5–36.3)
APTT BLD: 98 SEC — HIGH (ref 27.5–36.3)
APTT BLD: >200 SEC — CRITICAL HIGH (ref 27.5–36.3)
AST SERPL-CCNC: 103 U/L — HIGH (ref 10–40)
AST SERPL-CCNC: 105 U/L — HIGH (ref 10–40)
BASOPHILS # BLD AUTO: 0.03 K/UL — SIGNIFICANT CHANGE UP (ref 0–0.2)
BASOPHILS NFR BLD AUTO: 0.3 % — SIGNIFICANT CHANGE UP (ref 0–2)
BILIRUB SERPL-MCNC: 0.4 MG/DL — SIGNIFICANT CHANGE UP (ref 0.2–1.2)
BILIRUB SERPL-MCNC: 0.4 MG/DL — SIGNIFICANT CHANGE UP (ref 0.2–1.2)
BUN SERPL-MCNC: 104 MG/DL — HIGH (ref 7–23)
BUN SERPL-MCNC: 105 MG/DL — HIGH (ref 7–23)
C3 SERPL-MCNC: 149 MG/DL — SIGNIFICANT CHANGE UP (ref 81–157)
C4 SERPL-MCNC: 26 MG/DL — SIGNIFICANT CHANGE UP (ref 13–39)
CALCIUM SERPL-MCNC: 8.7 MG/DL — SIGNIFICANT CHANGE UP (ref 8.4–10.5)
CALCIUM SERPL-MCNC: 8.8 MG/DL — SIGNIFICANT CHANGE UP (ref 8.4–10.5)
CD3 BLASTS SPEC-ACNC: 663 /UL — LOW (ref 672–1870)
CD3 BLASTS SPEC-ACNC: 74 % — SIGNIFICANT CHANGE UP (ref 59–83)
CD4 %: 49 % — SIGNIFICANT CHANGE UP (ref 30–62)
CD8 %: 23 % — SIGNIFICANT CHANGE UP (ref 12–36)
CHLORIDE SERPL-SCNC: 101 MMOL/L — SIGNIFICANT CHANGE UP (ref 96–108)
CHLORIDE SERPL-SCNC: 102 MMOL/L — SIGNIFICANT CHANGE UP (ref 96–108)
CK SERPL-CCNC: 16 U/L — LOW (ref 25–170)
CO2 SERPL-SCNC: 25 MMOL/L — SIGNIFICANT CHANGE UP (ref 22–31)
CO2 SERPL-SCNC: 25 MMOL/L — SIGNIFICANT CHANGE UP (ref 22–31)
CREAT SERPL-MCNC: 4.27 MG/DL — HIGH (ref 0.5–1.3)
CREAT SERPL-MCNC: 4.47 MG/DL — HIGH (ref 0.5–1.3)
D DIMER BLD IA.RAPID-MCNC: 844 NG/ML DDU — HIGH
EOSINOPHIL # BLD AUTO: 0.12 K/UL — SIGNIFICANT CHANGE UP (ref 0–0.5)
EOSINOPHIL NFR BLD AUTO: 1.1 % — SIGNIFICANT CHANGE UP (ref 0–6)
GAS PNL BLDA: SIGNIFICANT CHANGE UP
GBM IGG SER-ACNC: <1 AI — SIGNIFICANT CHANGE UP
GLUCOSE BLDC GLUCOMTR-MCNC: 117 MG/DL — HIGH (ref 70–99)
GLUCOSE BLDC GLUCOMTR-MCNC: 118 MG/DL — HIGH (ref 70–99)
GLUCOSE BLDC GLUCOMTR-MCNC: 123 MG/DL — HIGH (ref 70–99)
GLUCOSE SERPL-MCNC: 111 MG/DL — HIGH (ref 70–99)
GLUCOSE SERPL-MCNC: 134 MG/DL — HIGH (ref 70–99)
HCT VFR BLD CALC: 24.1 % — LOW (ref 34.5–45)
HGB BLD-MCNC: 7.2 G/DL — LOW (ref 11.5–15.5)
IMM GRANULOCYTES NFR BLD AUTO: 0.6 % — SIGNIFICANT CHANGE UP (ref 0–1.5)
INR BLD: 1.08 RATIO — SIGNIFICANT CHANGE UP (ref 0.88–1.16)
KAPPA LC SER QL IFE: 11.75 MG/DL — HIGH (ref 0.33–1.94)
KAPPA/LAMBDA FREE LIGHT CHAIN RATIO, SERUM: 1.05 RATIO — SIGNIFICANT CHANGE UP (ref 0.26–1.65)
LAMBDA LC SER QL IFE: 11.19 MG/DL — HIGH (ref 0.57–2.63)
LDH SERPL L TO P-CCNC: 267 U/L — HIGH (ref 50–242)
LYMPHOCYTES # BLD AUTO: 0.9 K/UL — LOW (ref 1–3.3)
LYMPHOCYTES # BLD AUTO: 8.3 % — LOW (ref 13–44)
MAGNESIUM SERPL-MCNC: 2.5 MG/DL — SIGNIFICANT CHANGE UP (ref 1.6–2.6)
MAGNESIUM SERPL-MCNC: 2.5 MG/DL — SIGNIFICANT CHANGE UP (ref 1.6–2.6)
MCHC RBC-ENTMCNC: 25.7 PG — LOW (ref 27–34)
MCHC RBC-ENTMCNC: 29.9 GM/DL — LOW (ref 32–36)
MCV RBC AUTO: 86.1 FL — SIGNIFICANT CHANGE UP (ref 80–100)
MONOCYTES # BLD AUTO: 1.41 K/UL — HIGH (ref 0–0.9)
MONOCYTES NFR BLD AUTO: 13 % — SIGNIFICANT CHANGE UP (ref 2–14)
NEUTROPHILS # BLD AUTO: 8.3 K/UL — HIGH (ref 1.8–7.4)
NEUTROPHILS NFR BLD AUTO: 76.7 % — SIGNIFICANT CHANGE UP (ref 43–77)
NRBC # BLD: 0 /100 WBCS — SIGNIFICANT CHANGE UP (ref 0–0)
PHOSPHATE SERPL-MCNC: 6.4 MG/DL — HIGH (ref 2.5–4.5)
PHOSPHATE SERPL-MCNC: 6.5 MG/DL — HIGH (ref 2.5–4.5)
PLATELET # BLD AUTO: 227 K/UL — SIGNIFICANT CHANGE UP (ref 150–400)
POTASSIUM SERPL-MCNC: 3.2 MMOL/L — LOW (ref 3.5–5.3)
POTASSIUM SERPL-MCNC: 3.6 MMOL/L — SIGNIFICANT CHANGE UP (ref 3.5–5.3)
POTASSIUM SERPL-SCNC: 3.2 MMOL/L — LOW (ref 3.5–5.3)
POTASSIUM SERPL-SCNC: 3.6 MMOL/L — SIGNIFICANT CHANGE UP (ref 3.5–5.3)
PROCALCITONIN SERPL-MCNC: 0.34 NG/ML — HIGH (ref 0.02–0.1)
PROT SERPL-MCNC: 5.1 G/DL — LOW (ref 6–8.3)
PROT SERPL-MCNC: 5.4 G/DL — LOW (ref 6–8.3)
PROTHROM AB SERPL-ACNC: 12.4 SEC — SIGNIFICANT CHANGE UP (ref 10–12.9)
RBC # BLD: 2.8 M/UL — LOW (ref 3.8–5.2)
RBC # FLD: 15.8 % — HIGH (ref 10.3–14.5)
SODIUM SERPL-SCNC: 143 MMOL/L — SIGNIFICANT CHANGE UP (ref 135–145)
SODIUM SERPL-SCNC: 144 MMOL/L — SIGNIFICANT CHANGE UP (ref 135–145)
T-CELL CD4 SUBSET PNL BLD: 443 /UL — LOW (ref 489–1457)
TRIGL SERPL-MCNC: 98 MG/DL — SIGNIFICANT CHANGE UP (ref 10–149)
TROPONIN T, HIGH SENSITIVITY RESULT: 74 NG/L — HIGH (ref 0–51)
VANCOMYCIN FLD-MCNC: 17 UG/ML — SIGNIFICANT CHANGE UP
WBC # BLD: 10.83 K/UL — HIGH (ref 3.8–10.5)
WBC # FLD AUTO: 10.83 K/UL — HIGH (ref 3.8–10.5)

## 2020-04-20 PROCEDURE — 99233 SBSQ HOSP IP/OBS HIGH 50: CPT

## 2020-04-20 PROCEDURE — 99232 SBSQ HOSP IP/OBS MODERATE 35: CPT

## 2020-04-20 PROCEDURE — 71045 X-RAY EXAM CHEST 1 VIEW: CPT | Mod: 26

## 2020-04-20 PROCEDURE — 99291 CRITICAL CARE FIRST HOUR: CPT

## 2020-04-20 RX ORDER — POTASSIUM CHLORIDE 20 MEQ
20 PACKET (EA) ORAL
Refills: 0 | Status: DISCONTINUED | OUTPATIENT
Start: 2020-04-20 | End: 2020-04-20

## 2020-04-20 RX ORDER — POTASSIUM CHLORIDE 20 MEQ
10 PACKET (EA) ORAL
Refills: 0 | Status: COMPLETED | OUTPATIENT
Start: 2020-04-20 | End: 2020-04-20

## 2020-04-20 RX ORDER — HEPARIN SODIUM 5000 [USP'U]/ML
900 INJECTION INTRAVENOUS; SUBCUTANEOUS
Qty: 25000 | Refills: 0 | Status: DISCONTINUED | OUTPATIENT
Start: 2020-04-20 | End: 2020-04-21

## 2020-04-20 RX ADMIN — Medication 100 MILLIEQUIVALENT(S): at 05:15

## 2020-04-20 RX ADMIN — Medication 1 DROP(S): at 04:11

## 2020-04-20 RX ADMIN — Medication 100 MICROGRAM(S): at 21:01

## 2020-04-20 RX ADMIN — Medication 10 MILLIGRAM(S): at 18:30

## 2020-04-20 RX ADMIN — HEPARIN SODIUM 0 UNIT(S)/HR: 5000 INJECTION INTRAVENOUS; SUBCUTANEOUS at 11:33

## 2020-04-20 RX ADMIN — FAMOTIDINE 20 MILLIGRAM(S): 10 INJECTION INTRAVENOUS at 11:33

## 2020-04-20 RX ADMIN — Medication 100 MILLIEQUIVALENT(S): at 04:20

## 2020-04-20 RX ADMIN — Medication 100 MILLIEQUIVALENT(S): at 06:39

## 2020-04-20 RX ADMIN — INSULIN GLARGINE 8 UNIT(S): 100 INJECTION, SOLUTION SUBCUTANEOUS at 08:54

## 2020-04-20 RX ADMIN — Medication 1 DROP(S): at 18:51

## 2020-04-20 NOTE — PROGRESS NOTE ADULT - SUBJECTIVE AND OBJECTIVE BOX
HISTORY  65y Female with COVID-19(+) pneumonia requiring intubation.    24 HOUR EVENTS:    SUBJECTIVE/ROS: Due to intubation with sedation, subjective information was not able to be obtained from the patient. History was obtained, to the extent possible, from review of the chart and collateral sources of information.      NEURO  Exam: sedated, no acute distress  Meds: acetaminophen  Suppository .. 650 milliGRAM(s) Rectal every 6 hours PRN Temp greater or equal to 38C (100.4F), Mild Pain (1 - 3)  aspirin Suppository 300 milliGRAM(s) Rectal daily    Adequacy of sedation and pain control has been assessed and adjusted.      RESPIRATORY  RR: 16 (14 - 28)  SpO2: 99% (98% - 100%)  Exam: without dissynchrony  Mechanical Ventilation: Mode: 6 l nc  Predicted Body Weight:  Tidal Volume/PBW ratio:  PaO2 to FiO2 ratio:  Extubation readiness assessed.  ABG - ( 20 Apr 2020 00:20 )  pH: 7.43  /  pCO2: 41    /  pO2: 149   / HCO3: 27    / Base Excess: 2.9   /  SaO2: 100     Lactate: x                CARDIOVASCULAR  HR: 77 (60 - 77)  ABP: 135/72 (135/72 - 167/63)  ABP(mean): 91 (90 - 101)  Cardiac Rhythm: sinus  Most recent QTc:   Meds: hydrALAZINE Injectable PRN        GI/NUTRITION  Diet:   Most recent bowel movement:  Meds/stress ulcer prophylaxis: bisacodyl Suppository 10 milliGRAM(s) Rectal daily PRN Constipation  dextrose 5%. 1000 milliLiter(s) IV Continuous <Continuous>  famotidine Injectable 20 milliGRAM(s) IV Push daily        GENITOURINARY  I&O's Detail    04-18 @ 07:01 - 04-19 @ 07:00  --------------------------------------------------------  IN:    Enteral Tube Flush: 80 mL    Nepro with Carb Steady: 360 mL    Solution: 50 mL  Total IN: 490 mL    OUT:    Indwelling Catheter - Urethral: 1155 mL  Total OUT: 1155 mL    Total NET: -665 mL      04-19 @ 07:01 - 04-20 @ 02:39  --------------------------------------------------------  IN:    heparin  Infusion.: 243 mL    Solution: 50 mL  Total IN: 293 mL    OUT:    Indwelling Catheter - Urethral: 1075 mL  Total OUT: 1075 mL    Total NET: -782 mL          04-20    144  |  102  |  105<H>  ----------------------------<  134<H>  3.2<L>   |  25  |  4.47<H>    Ca    8.8      20 Apr 2020 00:26  Phos  6.4     04-20  Mg     2.5     04-20    TPro  x   /  Alb  x   /  TBili  0.4  /  DBili  x   /  AST  x   /  ALT  x   /  AlkPhos  x   04-20    [x] Glass catheter, indication: urine output monitoring in critically ill patient.  Meds: dextrose 5%. 1000 milliLiter(s) IV Continuous <Continuous>    Creatine Kinase, Serum: 16 (04-20-20 @ 00:26)        HEMATOLOGIC  Meds/VTE prophylaxis: heparin   Injectable 8000 Unit(s) IV Push every 6 hours PRN  heparin   Injectable 4000 Unit(s) IV Push every 6 hours PRN  heparin  Infusion.  Unit(s)/Hr IV Continuous <Continuous>                          7.2    10.83 )-----------( 227      ( 20 Apr 2020 00:26 )             24.1     PT/INR - ( 20 Apr 2020 00:26 )   PT: 12.4 sec;   INR: 1.08 ratio         PTT - ( 20 Apr 2020 00:26 )  PTT:182.9 sec  44026-03-89 @ 00:26        INFECTIOUS DISEASES  T(C): 35.6, Max: 37 (04-19-20 @ 12:00)  WBC Count: 10.83 (04-20-20 @ 00:26)  WBC Count: 9.57 (04-19-20 @ 00:43)    Recent Cultures:  Specimen Source: .Blood Blood-Peripheral, 04-16 @ 14:11; Results   No growth to date.; Gram Stain: --; Organism: --  Specimen Source: .Urine Catheterized, 04-16 @ 14:07; Results   No growth; Gram Stain: --; Organism: --  Specimen Source: .Urine Catheterized, 04-13 @ 20:42; Results   <10,000 CFU/mL Normal Urogenital Becca; Gram Stain: --; Organism: --  Specimen Source: .Blood Triple Lumen BROWN, 04-13 @ 09:17; Results   No Growth Final; Gram Stain: --; Organism: --    Meds:   Procalcitonin, Serum: 0.34 ng/mL (04-20-20 @ 00:26)        ENDOCRINE  Fingersticks: 148, 129, 153, 230, 246  Meds: dextrose 40% Gel 15 Gram(s) Oral once PRN  dextrose 50% Injectable 12.5 Gram(s) IV Push once  dextrose 50% Injectable 25 Gram(s) IV Push once  dextrose 50% Injectable 25 Gram(s) IV Push once  glucagon  Injectable 1 milliGRAM(s) IntraMuscular once PRN  insulin glargine Injectable (LANTUS) 8 Unit(s) SubCutaneous every morning  insulin lispro (HumaLOG) corrective regimen sliding scale   SubCutaneous three times a day before meals  levothyroxine Injectable 100 MICROGram(s) IV Push at bedtime        ACCESS DEVICES:  [ ] Peripheral IV  [x] Central Venous Line	[ ] R	[ ] L	[ ] IJ	[ ] Fem	[ ] SC	Placed:   [x] Arterial Line		[ ] R	[ ] L	[ ] Fem	[ ] Rad	[ ] Ax	Placed:   [ ] Shiley HD Access             [ ] R [ ] L [ ] IJ  [ ] Fem     Placed:  [x] Urinary Catheter, Date Placed:   Necessity of urinary, arterial, and venous catheters discussed.      CODE STATUS:     IMAGING:

## 2020-04-20 NOTE — CHART NOTE - NSCHARTNOTEFT_GEN_A_CORE
Patient examined and case reviewed in detail on bedside rounds as reflected in daily progress note.   Patient on ventilator with severe COVID-19 pneumonia.  Assessment Severe COVID PNA Vent settings optimized

## 2020-04-20 NOTE — PROGRESS NOTE ADULT - SUBJECTIVE AND OBJECTIVE BOX
[facility] DIVISION OF KIDNEY DISEASES AND HYPERTENSION -- FOLLOW UP NOTE  --------------------------------------------------------------------------------  Chief Complaint:    24 hour events/subjective:        PAST HISTORY  --------------------------------------------------------------------------------  No significant changes to PMH, PSH, FHx, SHx, unless otherwise noted    ALLERGIES & MEDICATIONS  --------------------------------------------------------------------------------  Allergies    No Known Allergies    Intolerances      Standing Inpatient Medications  artificial  tears Solution 1 Drop(s) Both EYES two times a day  aspirin Suppository 300 milliGRAM(s) Rectal daily  dextrose 5%. 1000 milliLiter(s) IV Continuous <Continuous>  dextrose 50% Injectable 12.5 Gram(s) IV Push once  dextrose 50% Injectable 25 Gram(s) IV Push once  dextrose 50% Injectable 25 Gram(s) IV Push once  famotidine Injectable 20 milliGRAM(s) IV Push daily  heparin  Infusion.  Unit(s)/Hr IV Continuous <Continuous>  insulin glargine Injectable (LANTUS) 8 Unit(s) SubCutaneous every morning  insulin lispro (HumaLOG) corrective regimen sliding scale   SubCutaneous three times a day before meals  levothyroxine Injectable 100 MICROGram(s) IV Push at bedtime    PRN Inpatient Medications  acetaminophen  Suppository .. 650 milliGRAM(s) Rectal every 6 hours PRN  bisacodyl Suppository 10 milliGRAM(s) Rectal daily PRN  dextrose 40% Gel 15 Gram(s) Oral once PRN  glucagon  Injectable 1 milliGRAM(s) IntraMuscular once PRN  heparin   Injectable 8000 Unit(s) IV Push every 6 hours PRN  heparin   Injectable 4000 Unit(s) IV Push every 6 hours PRN  hydrALAZINE Injectable 10 milliGRAM(s) IV Push every 6 hours PRN    ROS unable to do    VITALS/PHYSICAL EXAM  --------------------------------------------------------------------------------  T(C): 36.7 (04-20-20 @ 08:00), Max: 37.2 (04-20-20 @ 04:00)  HR: 85 (04-20-20 @ 08:00) (60 - 89)  BP: 151/65 (04-20-20 @ 08:00) (125/62 - 151/65)  RR: 18 (04-20-20 @ 08:00) (14 - 21)  SpO2: 98% (04-20-20 @ 08:00) (94% - 100%)  Wt(kg): --        04-19-20 @ 07:01  -  04-20-20 @ 07:00  --------------------------------------------------------  IN: 441 mL / OUT: 1550 mL / NET: -1109 mL    04-20-20 @ 07:01  -  04-20-20 @ 11:32  --------------------------------------------------------  IN: 0 mL / OUT: 150 mL / NET: -150 mL      PE: Due to COVID-19, not done and relied on primary team exam      LABS/STUDIES  --------------------------------------------------------------------------------              7.2    10.83 >-----------<  227      [04-20-20 @ 00:26]              24.1     144  |  102  |  105  ----------------------------<  134      [04-20-20 @ 00:26]  3.2   |  25  |  4.47        Ca     8.8     [04-20-20 @ 00:26]      Mg     2.5     [04-20-20 @ 00:26]      Phos  6.4     [04-20-20 @ 00:26]    TPro  x   /  Alb  x   /  TBili  0.4  /  DBili  x   /  AST  x   /  ALT  x   /  AlkPhos  x   [04-20-20 @ 00:33]    PT/INR: PT 12.4 , INR 1.08       [04-20-20 @ 00:26]  PTT: >200.0      [04-20-20 @ 09:28]    CK 16      [04-20-20 @ 00:26]        [04-20-20 @ 00:26]    Creatinine Trend:  SCr 4.47 [04-20 @ 00:26]  SCr 4.41 [04-19 @ 16:56]  SCr 4.57 [04-19 @ 00:43]  SCr 4.54 [04-18 @ 15:54]  SCr 4.47 [04-18 @ 00:32]    Urinalysis - [04-16-20 @ 11:45]      Color Yellow / Appearance Clear / SG 1.017 / pH 6.5      Gluc 500 mg/dL / Ketone Negative  / Bili Negative / Urobili Negative       Blood Negative / Protein 300 mg/dL / Leuk Est Negative / Nitrite Negative      RBC 6 / WBC 25 / Hyaline 4 / Gran  / Sq Epi  / Non Sq Epi 1 / Bacteria Negative    Urine Creatinine 46      [04-19-20 @ 00:43]  Urine Protein 151      [04-19-20 @ 00:43]    Ferritin 2292      [04-16-20 @ 04:32]  TSH 4.06      [04-14-20 @ 16:12]  Lipid: chol --, TG 98, HDL --, LDL --      [04-20-20 @ 00:26]    HBsAb <3.0      [04-10-20 @ 00:19]  HBsAg Nonreact      [04-10-20 @ 00:19]  HBcAb Nonreact      [04-10-20 @ 00:19]  HCV 0.14, Nonreact      [04-10-20 @ 00:19]    C3 Complement 149      [04-18-20 @ 00:40]  C4 Complement 26      [04-18-20 @ 00:40]  Free Light Chains: kappa --, lambda 11.19, ratio = --      [04-18 @ 00:40]

## 2020-04-20 NOTE — PROGRESS NOTE ADULT - ATTENDING COMMENTS
Attending Attestation:   Physical Exam 4/20/2020  Appearance: Intubated but alert and on CPAP (sedation held)  Eyes: [ x] PERRL [x ] EOMI  HEENT: Intubated  [x ]NC/AT   Cardiovascular: [x ] S1 [x ] S2 [ x] RRR [ x] No m/r/g   1+ edema in all 4 extremities.   Respiratory: bilateral crackles  Gastrointestinal: Obese abdomen,  [x ] Soft [x ] Non-tender [x ] Non-distended [x ] BS+  Neurologic: Able to follow commands and move all 4 extremities. Generalized weakness but symmetric  Lymphatic: [x ] No lymphadenopathy  Skin: [x ] No rashes [x ] No cyanosis    - now extubated and doing well  - c/w hep gtt  - Creatinine overall stable. Renal is on board. Planned biopsy cancelled at this point.   - Will need intensive physical therapy.     45 minutes spent on total encounter; more than 50% of the visit was spent counseling and/or coordinating care by the attending physician.     Plan discussed with the patient's nurse and nurse practitioner

## 2020-04-20 NOTE — PROGRESS NOTE ADULT - ASSESSMENT
Assessment and Plan:   · Assessment		  Patient is a 65y old  Female who presents with a chief complaint of SOB AND HYPOXIA      Neuro   -  Awake, extubated,   - Mental status- awake follows commands, talks "I have a sore throat"  - Slight Right sided weakness -CTH- No S/H/M    CV  - Asa resumed as renal BX cancelled by renal  - Coreg and Norvasc held-NPO until pass swallow eval  - Hydralazine  changed to IV as she is NPO    Pulmonary  - extubated, O2 via n/c 5L 100 %  - AB.43/44/136/29   100%-before extubated  - Tolerated CPAP trial well  - CXR- unchanged b/l opacities    GI  - Nepro bolus feed 180ml Q 6 hours  -HELD -extubated, bedside swallow eval in am  - pepcid 20mg IV QD  - Last BM on 4/15, lactulose added      - Renal cancelled biopsy   - Making urine . Net -665  - Will hold HD today as per renal   - Phoslo stopped  -keep inonized calcium >1.1. 1.1()    Heme   -h/h: 7.1/ 23.2 stable   -Heparin gtt   - Trend D-dimer     ID  -Afebrile   -WBC stable, procal 0.39, lactate 0.8  - Cx sent blood from [facility] and peripheral urine culture and UA-NGTD  -Vanco dose per level for last dose today.  - completed Meropenum last dose was today  (end date )  Solumedrol d/c    - s/p anakinra (4/10-)  - completed solumedrol 50mg IV q 8hrs (until )  restart for 5 more day   -  ACTEMRA  400mg IVPB x 1 given (d/w Dr Foote ID)   - completed Plaquenil   - repeat COVID test sent  - still positive      ENDO  - , 129 today, NPO s/p extubation, try swallow eval in am. Lantus from 8u BID to      daily to prevent hypoglycemia   -Low continue ISS  - IV synthroid      Lines   - R IJ shiley (), L radial A-line  - L  IJ  triple lumen (4/3-)

## 2020-04-20 NOTE — PROGRESS NOTE ADULT - ASSESSMENT
65F PMHx DM2, HTN, HLD who present to ED for fever/dyspnea x 1 week.  Per HPI, pt desaturated at home O2 sat 70% which improved nasal cannula (another family member).  In ED hypoxia 70s on nasal cannula s/p intubation.  Covid positive with CXR b/l opacities. Given ceftriaxone, azithromycin, hydroxychlorquine.     Nephrology consulted for MARIO or MARIO on CKD.     MARIO/ Proteinuria    in setting septic shock Covid19 infection, low BP, also noted to have nephrotic range proteinuria  - s/p HD on 4/13, has stable labs since then, non-oliguric. No plan for HD today, will continue to reassess the need for RRT on daily basis  - Can hold off on biopsy. She has history of proteinuria on past labs and rept labs show only 3gm of proteinuria compared to 9gm on admission  - Monitor labs and urine output. Avoid NSAIDs, ACEI/ARBS, RCA and nephrotoxins. Dose vancomycin per level.      Metabolic acidosis   in the setting of MARIO   continue oral bicarbonate    Hyperphosphatemia  in the setting of MARIO   continue Ca acetate  Monitor serum phosphorus

## 2020-04-20 NOTE — PROGRESS NOTE ADULT - SUBJECTIVE AND OBJECTIVE BOX
HISTORY  65y Female with COVID-19(+) pneumonia requiring intubation.    24 HOUR EVENTS:    SUBJECTIVE/ROS: Due to intubation with sedation, subjective information was not able to be obtained from the patient. History was obtained, to the extent possible, from review of the chart and collateral sources of information.      NEURO  Exam: sedated, no acute distress  Meds: acetaminophen  Suppository .. 650 milliGRAM(s) Rectal every 6 hours PRN Temp greater or equal to 38C (100.4F), Mild Pain (1 - 3)  aspirin Suppository 300 milliGRAM(s) Rectal daily    Adequacy of sedation and pain control has been assessed and adjusted.      RESPIRATORY  RR: 16 (14 - 28)  SpO2: 99% (98% - 100%)  Exam: without dissynchrony  Mechanical Ventilation: Mode: 6 l nc  Predicted Body Weight:  Tidal Volume/PBW ratio:  PaO2 to FiO2 ratio:  Extubation readiness assessed.  ABG - ( 20 Apr 2020 00:20 )  pH: 7.43  /  pCO2: 41    /  pO2: 149   / HCO3: 27    / Base Excess: 2.9   /  SaO2: 100     Lactate: x                CARDIOVASCULAR  HR: 77 (60 - 77)  ABP: 135/72 (135/72 - 167/63)  ABP(mean): 91 (90 - 101)  Cardiac Rhythm: sinus  Most recent QTc:   Meds: hydrALAZINE Injectable PRN        GI/NUTRITION  Diet:   Most recent bowel movement:  Meds/stress ulcer prophylaxis: bisacodyl Suppository 10 milliGRAM(s) Rectal daily PRN Constipation  dextrose 5%. 1000 milliLiter(s) IV Continuous <Continuous>  famotidine Injectable 20 milliGRAM(s) IV Push daily  potassium chloride  20 mEq/100 mL IVPB 20 milliEquivalent(s) IV Intermittent every 2 hours        GENITOURINARY  I&O's Detail    04-18 @ 07:01  -  04-19 @ 07:00  --------------------------------------------------------  IN:    Enteral Tube Flush: 80 mL    Nepro with Carb Steady: 360 mL    Solution: 50 mL  Total IN: 490 mL    OUT:    Indwelling Catheter - Urethral: 1155 mL  Total OUT: 1155 mL    Total NET: -665 mL      04-19 @ 07:01  -  04-20 @ 03:17  --------------------------------------------------------  IN:    heparin  Infusion.: 255 mL    Solution: 50 mL  Total IN: 305 mL    OUT:    Indwelling Catheter - Urethral: 1225 mL  Total OUT: 1225 mL    Total NET: -920 mL          04-20    144  |  102  |  105<H>  ----------------------------<  134<H>  3.2<L>   |  25  |  4.47<H>    Ca    8.8      20 Apr 2020 00:26  Phos  6.4     04-20  Mg     2.5     04-20    TPro  x   /  Alb  x   /  TBili  0.4  /  DBili  x   /  AST  x   /  ALT  x   /  AlkPhos  x   04-20    [x] Glass catheter, indication: urine output monitoring in critically ill patient.  Meds: dextrose 5%. 1000 milliLiter(s) IV Continuous <Continuous>  potassium chloride  20 mEq/100 mL IVPB 20 milliEquivalent(s) IV Intermittent every 2 hours    Creatine Kinase, Serum: 16 (04-20-20 @ 00:26)        HEMATOLOGIC  Meds/VTE prophylaxis: heparin   Injectable 8000 Unit(s) IV Push every 6 hours PRN  heparin   Injectable 4000 Unit(s) IV Push every 6 hours PRN  heparin  Infusion.  Unit(s)/Hr IV Continuous <Continuous>                          7.2    10.83 )-----------( 227      ( 20 Apr 2020 00:26 )             24.1     PT/INR - ( 20 Apr 2020 00:26 )   PT: 12.4 sec;   INR: 1.08 ratio         PTT - ( 20 Apr 2020 00:26 )  PTT:182.9 sec  18621-02-26 @ 00:26        INFECTIOUS DISEASES  T(C): 35.6, Max: 37 (04-19-20 @ 12:00)  WBC Count: 10.83 (04-20-20 @ 00:26)  WBC Count: 9.57 (04-19-20 @ 00:43)    Recent Cultures:  Specimen Source: .Blood Blood-Peripheral, 04-16 @ 14:11; Results   No growth to date.; Gram Stain: --; Organism: --  Specimen Source: .Urine Catheterized, 04-16 @ 14:07; Results   No growth; Gram Stain: --; Organism: --  Specimen Source: .Urine Catheterized, 04-13 @ 20:42; Results   <10,000 CFU/mL Normal Urogenital Becca; Gram Stain: --; Organism: --  Specimen Source: .Blood Triple Lumen BROWN, 04-13 @ 09:17; Results   No Growth Final; Gram Stain: --; Organism: --    Meds:   Procalcitonin, Serum: 0.34 ng/mL (04-20-20 @ 00:26)        ENDOCRINE  Fingersticks: 148, 129, 153, 230, 246  Meds: dextrose 40% Gel 15 Gram(s) Oral once PRN  dextrose 50% Injectable 12.5 Gram(s) IV Push once  dextrose 50% Injectable 25 Gram(s) IV Push once  dextrose 50% Injectable 25 Gram(s) IV Push once  glucagon  Injectable 1 milliGRAM(s) IntraMuscular once PRN  insulin glargine Injectable (LANTUS) 8 Unit(s) SubCutaneous every morning  insulin lispro (HumaLOG) corrective regimen sliding scale   SubCutaneous three times a day before meals  levothyroxine Injectable 100 MICROGram(s) IV Push at bedtime        ACCESS DEVICES:  [ ] Peripheral IV  [x] Central Venous Line	[ ] R	[ ] L	[ ] IJ	[ ] Fem	[ ] SC	Placed:   [x] Arterial Line		[ ] R	[ ] L	[ ] Fem	[ ] Rad	[ ] Ax	Placed:   [ ] Shiley HD Access             [ ] R [ ] L [ ] IJ  [ ] Fem     Placed:  [x] Urinary Catheter, Date Placed:   Necessity of urinary, arterial, and venous catheters discussed.      CODE STATUS:     IMAGING: HISTORY  65y Female with COVID-19(+) pneumonia requiring intubation.    24 HOUR EVENTS:    SUBJECTIVE/ROS: Due to intubation with sedation, subjective information was not able to be obtained from the patient. History was obtained, to the extent possible, from review of the chart and collateral sources of information.    NEURO  Exam: sedated, no acute distress  Meds: acetaminophen  Suppository 650 milliGRAM(s) Rectal every 6 hours PRN Temp greater or equal to 38C (100.4F), Mild Pain (1 - 3)  aspirin Suppository 300 milliGRAM(s) Rectal daily    Adequacy of sedation and pain control has been assessed and adjusted.      RESPIRATORY  RR: 16 (14 - 28)  SpO2: 99% (98% - 100%)  Exam: without dissynchrony  Mechanical Ventilation: Mode: 6 l nc  Predicted Body Weight:  Tidal Volume/PBW ratio:  PaO2 to FiO2 ratio:  Extubation readiness assessed.  ABG - ( 20 Apr 2020 00:20 )  pH: 7.43  /  pCO2: 41    /  pO2: 149   / HCO3: 27    / Base Excess: 2.9   /  SaO2: 100     Lactate: x                CARDIOVASCULAR  HR: 77 (60 - 77)  ABP: 135/72 (135/72 - 167/63)  ABP(mean): 91 (90 - 101)  Cardiac Rhythm: sinus  Most recent QTc:   Meds: hydrALAZINE Injectable PRN        GI/NUTRITION  Diet:   Most recent bowel movement:  Meds/stress ulcer prophylaxis: bisacodyl Suppository 10 milliGRAM(s) Rectal daily PRN Constipation  dextrose 5%. 1000 milliLiter(s) IV Continuous <Continuous>  famotidine Injectable 20 milliGRAM(s) IV Push daily  potassium chloride  20 mEq/100 mL IVPB 20 milliEquivalent(s) IV Intermittent every 2 hours        GENITOURINARY  I&O's Detail    04-18 @ 07:01  -  04-19 @ 07:00  --------------------------------------------------------  IN:    Enteral Tube Flush: 80 mL    Nepro with Carb Steady: 360 mL    Solution: 50 mL  Total IN: 490 mL    OUT:    Indwelling Catheter - Urethral: 1155 mL  Total OUT: 1155 mL    Total NET: -665 mL      04-19 @ 07:01  -  04-20 @ 03:17  --------------------------------------------------------  IN:    heparin  Infusion.: 255 mL    Solution: 50 mL  Total IN: 305 mL    OUT:    Indwelling Catheter - Urethral: 1225 mL  Total OUT: 1225 mL    Total NET: -920 mL          04-20    144  |  102  |  105<H>  ----------------------------<  134<H>  3.2<L>   |  25  |  4.47<H>    Ca    8.8      20 Apr 2020 00:26  Phos  6.4     04-20  Mg     2.5     04-20    TPro  x   /  Alb  x   /  TBili  0.4  /  DBili  x   /  AST  x   /  ALT  x   /  AlkPhos  x   04-20    [x] Glass catheter, indication: urine output monitoring in critically ill patient.  Meds: dextrose 5%. 1000 milliLiter(s) IV Continuous <Continuous>  potassium chloride  20 mEq/100 mL IVPB 20 milliEquivalent(s) IV Intermittent every 2 hours    Creatine Kinase, Serum: 16 (04-20-20 @ 00:26)        HEMATOLOGIC  Meds/VTE prophylaxis: heparin   Injectable 8000 Unit(s) IV Push every 6 hours PRN  heparin   Injectable 4000 Unit(s) IV Push every 6 hours PRN  heparin  Infusion.  Unit(s)/Hr IV Continuous <Continuous>                          7.2    10.83 )-----------( 227      ( 20 Apr 2020 00:26 )             24.1     PT/INR - ( 20 Apr 2020 00:26 )   PT: 12.4 sec;   INR: 1.08 ratio         PTT - ( 20 Apr 2020 00:26 )  PTT:182.9 sec  38699-84-84 @ 00:26        INFECTIOUS DISEASES  T(C): 35.6, Max: 37 (04-19-20 @ 12:00)  WBC Count: 10.83 (04-20-20 @ 00:26)  WBC Count: 9.57 (04-19-20 @ 00:43)    Recent Cultures:  Specimen Source: .Blood Blood-Peripheral, 04-16 @ 14:11; Results   No growth to date.; Gram Stain: --; Organism: --  Specimen Source: .Urine Catheterized, 04-16 @ 14:07; Results   No growth; Gram Stain: --; Organism: --  Specimen Source: .Urine Catheterized, 04-13 @ 20:42; Results   <10,000 CFU/mL Normal Urogenital Becca; Gram Stain: --; Organism: --  Specimen Source: .Blood Triple Lumen BROWN, 04-13 @ 09:17; Results   No Growth Final; Gram Stain: --; Organism: --    Meds:   Procalcitonin, Serum: 0.34 ng/mL (04-20-20 @ 00:26)        ENDOCRINE  Fingersticks: 148, 129, 153, 230, 246  Meds: dextrose 40% Gel 15 Gram(s) Oral once PRN  dextrose 50% Injectable 12.5 Gram(s) IV Push once  dextrose 50% Injectable 25 Gram(s) IV Push once  dextrose 50% Injectable 25 Gram(s) IV Push once  glucagon  Injectable 1 milliGRAM(s) IntraMuscular once PRN  insulin glargine Injectable (LANTUS) 8 Unit(s) SubCutaneous every morning  insulin lispro (HumaLOG) corrective regimen sliding scale   SubCutaneous three times a day before meals  levothyroxine Injectable 100 MICROGram(s) IV Push at bedtime        ACCESS DEVICES:  [ ] Peripheral IV  [x] Central Venous Line	[ ] R	[ ] L	[ ] IJ	[ ] Fem	[ ] SC	Placed:   [x] Arterial Line		[ ] R	[ ] L	[ ] Fem	[ ] Rad	[ ] Ax	Placed:   [ ] Shiley HD Access             [ ] R [ ] L [ ] IJ  [ ] Fem     Placed:  [x] Urinary Catheter, Date Placed:   Necessity of urinary, arterial, and venous catheters discussed.      CODE STATUS:     IMAGING:

## 2020-04-20 NOTE — PROGRESS NOTE ADULT - ASSESSMENT
Physical Exam:   Reference Recent Physical Exam:  · In accordance with current standards limiting patient contact please refer to the recent:	Other	  · Other Physical Exam Document	Patient was examined on 2020 at 11:00am	    Assessment and Plan:   · Assessment		  Patient is a 65y old  Female who presents with a chief complaint of SOB AND HYPOXIA      Neuro   -  Awake, extubated,   - Mental status- awake follows commands, talks "I have a sore throat"  - Slight Right sided weakness -CTH- No S/H/M    CV  - Asa resumed as renal BX cancelled by renal  - Coreg and Norvasc held-NPO until pass swallow eval  - Hydralazine  changed to IV as she is NPO    Pulmonary  - extubated, O2 via n/c 5L 100 %  - AB.43/44/136/29   100%-before extubated  - Tolerated CPAP trial well  - CXR- unchanged b/l opacities    GI  - Nepro bolus feed 180ml Q 6 hours  -HELD -extubated, bedside swallow eval in am  - pepcid 20mg IV QD  - Last BM on 4/15, lactulose added      - Renal cancelled biopsy   - Making urine . Net -665  - Will hold HD today as per renal   - Phoslo stopped  -keep inonized calcium >1.1. 1.1()  - K+ 3.2 MeQ > Potassium 40meQ x 2 ()   - Cr 4.41 > 4.47     Heme   -h/h: 7.1/ 23.2 stable   -Heparin gtt     ID  -Afebrile   -WBC stable, procal 0.39, lactate 0.8  - Cx sent blood from [facility] and pripheral urine culture and UA-NGTD  -Vanco dose per level for last dose today.  - completed Meropenum last dose was today  (end date )  Solumedrol d/c    - s/p anakinra (4/10-)  - completed solumedrol 50mg IV q 8hrs (until )  restart for 5 more day   -  ACTEMRA  400mg IVPB x 1 given (d/w Dr Foote ID)   - completed Plaquenil   - repeat COVID test sent  - still positive      ENDO  - , 129 today, NPO s/p extubation, try swallow eval in am. Lantus from 8u BID to      daily to prevent hypoglycemia   -Low continue HISS  - IV synthroid      Lines   - R IJ shiley (), L radial A-line  - L  IJ  triple lumen (4/3-) Physical Exam:   Reference Recent Physical Exam:  · In accordance with current standards limiting patient contact please refer to the recent:	Other	  · Other Physical Exam Document	Patient was examined on 2020 at 11:00am	    Assessment and Plan:   · Assessment		  Patient is a 65 year old female who presents with a chief complaint of SOB AND HYPOXIA      Neuro   -  Awake, extubated,   - Mental status- awake follows simple commands   - Slight right sided weakness -CTH- No S/H/M    CV  - Asa resumed as renal BX cancelled by renal  - Coreg and Norvasc held-NPO until pass swallow eval  - Hydralazine  changed to IV as she is NPO    Pulmonary  - extubated, O2 via n/c 5L 100 %  - AB.43/44/136/29   100%-before extubated  - Tolerated CPAP trial well  - CXR- unchanged b/l opacities    GI  - Nepro bolus feed 180ml Q 6 hours  -HELD -extubated, bedside swallow eval in am  - Pepcid 20mg IV QD  - Last BM on 4/15, lactulose added      - Renal cancelled biopsy   - Making urine . Net -665  - Will hold HD today as per renal   - Phoslo stopped  - keep ionized calcium >1.1. 1.1()  - K+ 3.2 MeQ > Potassium 40meQ x 2 ()   - Cr 4.41 > 4.47     Heme   -h/h: 7.1/ 23.2 stable   -Heparin gtt     ID  -Afebrile   -WBC stable, procal 0.39, lactate 0.8  - Cx sent blood from [facility] and pripheral urine culture and UA-NGTD  -Vanco dose per level for last dose today.  - completed Meropenum last dose was today  (end date )  Solumedrol d/c    - s/p anakinra (4/10-)  - completed solumedrol 50mg IV q 8hrs (until )  restart for 5 more day   -  ACTEMRA  400mg IVPB x 1 given (d/w Dr Foote ID)   - completed Plaquenil   - repeat COVID test sent  - still positive      ENDO  - , 129 today, NPO s/p extubation, try swallow eval in am. Lantus from 8u BID to      daily to prevent hypoglycemia   -Low continue HISS  - IV synthroid      Lines   - R IJ shiley (), L radial A-line  - L  IJ  triple lumen (4/3-)

## 2020-04-20 NOTE — PROGRESS NOTE ADULT - SUBJECTIVE AND OBJECTIVE BOX
Follow Up:  COVID19    Interval History: hypothermic overnight. remains sedated    REVIEW OF SYSTEMS  [ x ] ROS unobtainable because:  intubated and sedated  [  ] All other systems negative except as noted below    Constitutional:  [ ] fever [ ] chills  [ ] weight loss  [ ] weakness  Skin:  [ ] rash [ ] phlebitis	  Eyes: [ ] icterus [ ] pain  [ ] discharge	  ENMT: [ ] sore throat  [ ] thrush [ ] ulcers [ ] exudates  Respiratory: [ ] dyspnea [ ] hemoptysis [ ] cough [ ] sputum	  Cardiovascular:  [ ] chest pain [ ] palpitations [ ] edema	  Gastrointestinal:  [ ] nausea [ ] vomiting [ ] diarrhea [ ] constipation [ ] pain	  Genitourinary:  [ ] dysuria [ ] frequency [ ] hematuria [ ] discharge [ ] flank pain  [ ] incontinence  Musculoskeletal:  [ ] myalgias [ ] arthralgias [ ] arthritis  [ ] back pain  Neurological:  [ ] headache [ ] seizures  [ ] confusion/altered mental status    Allergies  No Known Allergies        ANTIMICROBIALS:      OTHER MEDS:  MEDICATIONS  (STANDING):  acetaminophen  Suppository .. 650 every 6 hours PRN  aspirin Suppository 300 daily  bisacodyl Suppository 10 daily PRN  dextrose 40% Gel 15 once PRN  dextrose 50% Injectable 12.5 once  dextrose 50% Injectable 25 once  dextrose 50% Injectable 25 once  famotidine Injectable 20 daily  glucagon  Injectable 1 once PRN  heparin   Injectable 8000 every 6 hours PRN  heparin   Injectable 4000 every 6 hours PRN  heparin  Infusion 900 <Continuous>  hydrALAZINE Injectable 10 every 6 hours PRN  insulin glargine Injectable (LANTUS) 8 every morning  insulin lispro (HumaLOG) corrective regimen sliding scale  three times a day before meals  levothyroxine Injectable 100 at bedtime      Vital Signs Last 24 Hrs  T(C): 36.6 (20 Apr 2020 20:00), Max: 37.2 (20 Apr 2020 04:00)  T(F): 97.9 (20 Apr 2020 20:00), Max: 98.9 (20 Apr 2020 04:00)  HR: 79 (20 Apr 2020 20:00) (70 - 89)  BP: 173/68 (20 Apr 2020 20:00) (125/62 - 173/68)  BP(mean): 99 (20 Apr 2020 20:00) (80 - 103)  RR: 16 (20 Apr 2020 20:00) (14 - 21)  SpO2: 98% (20 Apr 2020 20:00) (94% - 99%)    PHYSICAL EXAMINATION:  General: Intubated and Sedated  HEENT: +ETT  Resp: +ETT with regular RR  : Glass  Vascular: RIAMEENA Shiley catheter  Neuro: Intubated and Sedated  Psych: Unable to assess - intubated and sedated                          7.2    10.83 )-----------( 227      ( 20 Apr 2020 00:26 )             24.1       04-20    143  |  101  |  104<H>  ----------------------------<  111<H>  3.6   |  25  |  4.27<H>    Ca    8.7      20 Apr 2020 14:19  Phos  6.5     04-20  Mg     2.5     04-20    TPro  5.4<L>  /  Alb  2.2<L>  /  TBili  0.4  /  DBili  x   /  AST  103<H>  /  ALT  134<H>  /  AlkPhos  881<H>  04-20          MICROBIOLOGY:  Vancomycin Level, Random: 17.0 ug/mL (04-20-20 @ 00:26)  v  .Blood Blood-Peripheral  04-16-20   No growth to date.  --  --      .Urine Catheterized  04-16-20   No growth  --  --      .Urine Catheterized  04-13-20   <10,000 CFU/mL Normal Urogenital Becca  --  --      .Blood Triple Lumen BROWN  04-13-20   No Growth Final  --  --      .Blood Blood-Peripheral  04-03-20   No Growth Final  --  --      .Blood Blood-Peripheral  04-03-20   No Growth Final  --  --    RADIOLOGY:    <The imaging below has been reviewed and visualized by me independently. Findings as detailed in report below>    EXAM:  XR CHEST PORTABLE URGENT 1V                        PROCEDURE DATE:  04/19/2020    Limited study due to patient rotation.  Questionable left basilar opacity is unchanged from prior study.

## 2020-04-20 NOTE — PROGRESS NOTE ADULT - ASSESSMENT
65 year old female PMH DM, HTN, HLD who presented to [facility] on 4/3/20 with one week of shortness of breath and fevers.     COVID19 PCR positive on 4/3.   Plaquenil: 4/4 --> 4/6  Solumedrol: 4/8 --> 4/11, 4/16 --> 4/17  Tocilizumab: 4/7  Anakinra (BID): 4/10 --> 4/13    Given fever, rise in procalcitonin and WBC count patient was started on Vancomycin and Meropenem on 4/13.   Completed 7 day empiric course of Meropenem and Vancomycin  Blood cultures with NGTD    Ceftriaxone: 4/3  Azithromycin: 4/3  Vancomycin: 4/13 --> 4/20  Meropenem: 4/13 --> 419    #Fever, Leukocytosis, Abnormal Lab Work (Elevated Procalcitonin)  --Continue to monitor off of antibiotics.   --Continue to follow CBC with diff  --Continue to follow temperature curve  --Follow up on preliminary blood cultures    #COVID19 Pneumonia, Hypoxic Respiratory Failure, Fever, Lymphopenia, Transaminitis, Elevated ESR/CRP  --Maintain COVID19 Isolation Precautions  --If change in clinical status or O2 requirement increase would check new Procalcitonin, Ferritin, CRP, D-Dimer (limit to at most Q48H)   --Continue to follow CBC with diff  --Continue to follow BUN/Cr/eGFR  --Continue to follow transaminases  --Continue to follow temperature curve    I will continue to follow. Please feel free to contact me with any further questions.    Earl Rangel M.D.  [facility] Division of Infectious Disease  8AM-5PM: Pager Number [phone]  After Hours (or if no response): Please contact the Infectious Diseases Office at [phone]     The above assessment and plan were discussed with PICU PA 65 year old female PMH DM, HTN, HLD who presented to [facility] on 4/3/20 with one week of shortness of breath and fevers.     COVID19 PCR positive on 4/3.   Plaquenil: 4/4 --> 4/6  Solumedrol: 4/8 --> 4/11, 4/16 --> 4/17  Tocilizumab: 4/7  Anakinra (BID): 4/10 --> 4/13    Given fever, rise in procalcitonin and WBC count patient was started on Vancomycin and Meropenem on 4/13.   Completed 7 day empiric course of Meropenem and Vancomycin  Blood cultures with NGTD    Ceftriaxone: 4/3  Azithromycin: 4/3  Vancomycin: 4/13 --> 4/20  Meropenem: 4/13 --> 419    #Fever, Leukocytosis, Abnormal Lab Work (Elevated Procalcitonin)  --Continue to monitor off of antibiotics.   --Continue to follow CBC with diff  --Continue to follow temperature curve  --Follow up on preliminary blood cultures    #COVID19 Pneumonia, Hypoxic Respiratory Failure, Fever, Lymphopenia, Transaminitis, Elevated ESR/CRP  --Maintain COVID19 Isolation Precautions  --If change in clinical status or O2 requirement increase would check new Procalcitonin, Ferritin, CRP, D-Dimer (limit to at most Q48H)   --Continue to follow CBC with diff  --Continue to follow BUN/Cr/eGFR  --Continue to follow transaminases  --Continue to follow temperature curve    I will follow the patient as needed. Please feel free to contact me with any further questions or concerns.    Earl Rangel M.D.  [facility] Division of Infectious Disease  8AM-5PM: Pager Number [phone]  After Hours (or if no response): Please contact the Infectious Diseases Office at [phone]     The above assessment and plan were discussed with PICU PA

## 2020-04-21 LAB
ALBUMIN SERPL ELPH-MCNC: 2.3 G/DL — LOW (ref 3.3–5)
ALP SERPL-CCNC: 854 U/L — HIGH (ref 40–120)
ALT FLD-CCNC: 108 U/L — HIGH (ref 10–45)
ANA TITR SER: NEGATIVE — SIGNIFICANT CHANGE UP
ANION GAP SERPL CALC-SCNC: 18 MMOL/L — HIGH (ref 5–17)
APTT BLD: 106.3 SEC — HIGH (ref 27.5–36.3)
APTT BLD: 70 SEC — HIGH (ref 27.5–36.3)
AST SERPL-CCNC: 72 U/L — HIGH (ref 10–40)
AUTO DIFF PNL BLD: NEGATIVE — SIGNIFICANT CHANGE UP
B19V DNA FLD QL NAA+PROBE: SIGNIFICANT CHANGE UP IU/ML
BILIRUB SERPL-MCNC: 0.4 MG/DL — SIGNIFICANT CHANGE UP (ref 0.2–1.2)
BUN SERPL-MCNC: 104 MG/DL — HIGH (ref 7–23)
C-ANCA SER-ACNC: NEGATIVE — SIGNIFICANT CHANGE UP
CALCIUM SERPL-MCNC: 8.8 MG/DL — SIGNIFICANT CHANGE UP (ref 8.4–10.5)
CHLORIDE SERPL-SCNC: 103 MMOL/L — SIGNIFICANT CHANGE UP (ref 96–108)
CO2 SERPL-SCNC: 25 MMOL/L — SIGNIFICANT CHANGE UP (ref 22–31)
CREAT SERPL-MCNC: 4.24 MG/DL — HIGH (ref 0.5–1.3)
CRP SERPL-MCNC: 10.07 MG/DL — HIGH (ref 0–0.4)
CULTURE RESULTS: SIGNIFICANT CHANGE UP
CULTURE RESULTS: SIGNIFICANT CHANGE UP
D DIMER BLD IA.RAPID-MCNC: 540 NG/ML DDU — HIGH
DSDNA AB SER-ACNC: <12 IU/ML — SIGNIFICANT CHANGE UP
FERRITIN SERPL-MCNC: 999 NG/ML — HIGH (ref 15–150)
GLUCOSE BLDC GLUCOMTR-MCNC: 124 MG/DL — HIGH (ref 70–99)
GLUCOSE BLDC GLUCOMTR-MCNC: 154 MG/DL — HIGH (ref 70–99)
GLUCOSE BLDC GLUCOMTR-MCNC: 181 MG/DL — HIGH (ref 70–99)
GLUCOSE BLDC GLUCOMTR-MCNC: 214 MG/DL — HIGH (ref 70–99)
GLUCOSE SERPL-MCNC: 122 MG/DL — HIGH (ref 70–99)
HCT VFR BLD CALC: 24.5 % — LOW (ref 34.5–45)
HGB BLD-MCNC: 7.2 G/DL — LOW (ref 11.5–15.5)
INTERPRETATION SERPL IFE-IMP: SIGNIFICANT CHANGE UP
MAGNESIUM SERPL-MCNC: 2.5 MG/DL — SIGNIFICANT CHANGE UP (ref 1.6–2.6)
MCHC RBC-ENTMCNC: 26 PG — LOW (ref 27–34)
MCHC RBC-ENTMCNC: 29.4 GM/DL — LOW (ref 32–36)
MCV RBC AUTO: 88.4 FL — SIGNIFICANT CHANGE UP (ref 80–100)
NRBC # BLD: 0 /100 WBCS — SIGNIFICANT CHANGE UP (ref 0–0)
P-ANCA SER-ACNC: NEGATIVE — SIGNIFICANT CHANGE UP
PHOSPHATE SERPL-MCNC: 6.9 MG/DL — HIGH (ref 2.5–4.5)
PHOSPHOLIPASE A2 RECEPTOR ELISA: <2 RU/ML — SIGNIFICANT CHANGE UP
PHOSPHOLIPASE A2 RECEPTOR IFA: NEGATIVE — SIGNIFICANT CHANGE UP
PLATELET # BLD AUTO: 235 K/UL — SIGNIFICANT CHANGE UP (ref 150–400)
POTASSIUM SERPL-MCNC: 3.6 MMOL/L — SIGNIFICANT CHANGE UP (ref 3.5–5.3)
POTASSIUM SERPL-SCNC: 3.6 MMOL/L — SIGNIFICANT CHANGE UP (ref 3.5–5.3)
PROCALCITONIN SERPL-MCNC: 0.32 NG/ML — HIGH (ref 0.02–0.1)
PROT SERPL-MCNC: 5.3 G/DL — LOW (ref 6–8.3)
RBC # BLD: 2.77 M/UL — LOW (ref 3.8–5.2)
RBC # FLD: 16.1 % — HIGH (ref 10.3–14.5)
SODIUM SERPL-SCNC: 146 MMOL/L — HIGH (ref 135–145)
SPECIMEN SOURCE: SIGNIFICANT CHANGE UP
SPECIMEN SOURCE: SIGNIFICANT CHANGE UP
WBC # BLD: 9.86 K/UL — SIGNIFICANT CHANGE UP (ref 3.8–10.5)
WBC # FLD AUTO: 9.86 K/UL — SIGNIFICANT CHANGE UP (ref 3.8–10.5)

## 2020-04-21 PROCEDURE — 99291 CRITICAL CARE FIRST HOUR: CPT

## 2020-04-21 PROCEDURE — 99232 SBSQ HOSP IP/OBS MODERATE 35: CPT | Mod: GC

## 2020-04-21 PROCEDURE — 99233 SBSQ HOSP IP/OBS HIGH 50: CPT | Mod: GC

## 2020-04-21 RX ORDER — HEPARIN SODIUM 5000 [USP'U]/ML
800 INJECTION INTRAVENOUS; SUBCUTANEOUS
Qty: 25000 | Refills: 0 | Status: DISCONTINUED | OUTPATIENT
Start: 2020-04-21 | End: 2020-04-21

## 2020-04-21 RX ORDER — HEPARIN SODIUM 5000 [USP'U]/ML
700 INJECTION INTRAVENOUS; SUBCUTANEOUS
Qty: 25000 | Refills: 0 | Status: DISCONTINUED | OUTPATIENT
Start: 2020-04-21 | End: 2020-04-23

## 2020-04-21 RX ORDER — LACTULOSE 10 G/15ML
20 SOLUTION ORAL
Refills: 0 | Status: DISCONTINUED | OUTPATIENT
Start: 2020-04-21 | End: 2020-04-23

## 2020-04-21 RX ORDER — ASPIRIN/CALCIUM CARB/MAGNESIUM 324 MG
81 TABLET ORAL DAILY
Refills: 0 | Status: DISCONTINUED | OUTPATIENT
Start: 2020-04-21 | End: 2020-04-22

## 2020-04-21 RX ADMIN — Medication 1: at 11:46

## 2020-04-21 RX ADMIN — Medication 1 DROP(S): at 17:08

## 2020-04-21 RX ADMIN — LACTULOSE 20 GRAM(S): 10 SOLUTION ORAL at 20:27

## 2020-04-21 RX ADMIN — Medication 300 MILLIGRAM(S): at 11:35

## 2020-04-21 RX ADMIN — Medication 10 MILLIGRAM(S): at 03:10

## 2020-04-21 RX ADMIN — FAMOTIDINE 20 MILLIGRAM(S): 10 INJECTION INTRAVENOUS at 11:34

## 2020-04-21 RX ADMIN — INSULIN GLARGINE 8 UNIT(S): 100 INJECTION, SOLUTION SUBCUTANEOUS at 08:33

## 2020-04-21 RX ADMIN — Medication 1: at 17:10

## 2020-04-21 RX ADMIN — Medication 100 MICROGRAM(S): at 22:30

## 2020-04-21 RX ADMIN — Medication 1 DROP(S): at 06:14

## 2020-04-21 NOTE — PROGRESS NOTE ADULT - SUBJECTIVE AND OBJECTIVE BOX
HISTORY  65y Female with COVID-19(+) pneumonia requiring intubation, extubated 4/19    24 HOUR EVENTS:    SUBJECTIVE/ROS: Due to intubation with sedation, subjective information was not able to be obtained from the patient. History was obtained, to the extent possible, from review of the chart and collateral sources of information.    NEURO  Exam: A&O  Meds: acetaminophen  650 milliGRAM(s) every 6 hours PRN Temp greater or equal to 38C (100.4F), Mild Pain (1 - 3)  aspirin Suppository 300 milliGRAM(s) Rectal daily    Adequacy of sedation and pain control has been assessed and adjusted.      RESPIRATORY  SpO2: 99% (98% - 100%)  Exam: CTA  ABG - ( 20 Apr 2020 00:20 )  pH: 7.43  /  pCO2: 41    /  pO2: 149   / HCO3: 27    / Base Excess: 2.9   /  SaO2: 100     Lactate: x                CARDIOVASCULAR  HR: 77 (60 - 77)  ABP: 135/72 (135/72 - 167/63)  ABP(mean): 91 (90 - 101)  Cardiac Rhythm: sinus  Most recent QTc:   Meds: hydrALAZINE Injectable PRN        GI/NUTRITION  Diet: TF  Most recent bowel movement: 4/15  Meds/stress ulcer prophylaxis: bisacodyl Suppository 10 milliGRAM(s) Rectal daily PRN Constipation  dextrose 5%. 1000 milliLiter(s) IV Continuous <Continuous>  famotidine Injectable 20 milliGRAM(s) IV Push daily        GENITOURINARY  I&O's Detail  See flow sheet     --------------------------------------------------------  IN:    heparin  Infusion.: 255 mL    Solution: 50 mL  Total IN: 305 mL    OUT:    Indwelling Catheter - Urethral: 1225 mL  Total OUT: 1225 mL    Total NET: -920 mL      04-20    144  |  102  |  105<H>  ----------------------------<  134<H>  3.2<L>   |  25  |  4.47<H>    Ca    8.8      20 Apr 2020 00:26  Phos  6.4     04-20  Mg     2.5     04-20    TPro  x   /  Alb  x   /  TBili  0.4  /  DBili  x   /  AST  x   /  ALT  x   /  AlkPhos  x   04-20    [x] Glass catheter, indication: urine output monitoring in critically ill patient.  Meds: dextrose 5%. 1000 milliLiter(s) IV Continuous <Continuous>  potassium chloride  20 mEq/100 mL IVPB 20 milliEquivalent(s) IV Intermittent every 2 hours    Creatine Kinase, Serum: 16 (04-20-20 @ 00:26)        HEMATOLOGIC  Meds/VTE prophylaxis: heparin   Injectable 8000 Unit(s) IV Push every 6 hours PRN  heparin   Injectable 4000 Unit(s) IV Push every 6 hours PRN  heparin  Infusion.  Unit(s)/Hr IV Continuous <Continuous>                          7.2    10.83 )-----------( 227      ( 20 Apr 2020 00:26 )             24.1     PT/INR - ( 20 Apr 2020 00:26 )   PT: 12.4 sec;   INR: 1.08 ratio         PTT - ( 20 Apr 2020 00:26 )  PTT:182.9 sec  02705-91-21 @ 00:26        INFECTIOUS DISEASES  T(C): 35.6, Max: 37 (04-19-20 @ 12:00)  WBC Count: 10.83 (04-20-20 @ 00:26)  WBC Count: 9.57 (04-19-20 @ 00:43)    Recent Cultures:  Specimen Source: .Blood Blood-Peripheral, 04-16 @ 14:11; Results   No growth to date.; Gram Stain: --; Organism: --  Specimen Source: .Urine Catheterized, 04-16 @ 14:07; Results   No growth; Gram Stain: --; Organism: --  Specimen Source: .Urine Catheterized, 04-13 @ 20:42; Results   <10,000 CFU/mL Normal Urogenital Becca; Gram Stain: --; Organism: --  Specimen Source: .Blood Triple Lumen BROWN, 04-13 @ 09:17; Results   No Growth Final; Gram Stain: --; Organism: --    Meds:   Procalcitonin, Serum: 0.34 ng/mL (04-20-20 @ 00:26)        ENDOCRINE  Fingersticks:  118/117/123  Meds: dextrose 40% Gel 15 Gram(s) Oral once PRN  dextrose 50% Injectable 12.5 Gram(s) IV Push once  dextrose 50% Injectable 25 Gram(s) IV Push once  dextrose 50% Injectable 25 Gram(s) IV Push once  glucagon  Injectable 1 milliGRAM(s) IntraMuscular once PRN  insulin glargine Injectable (LANTUS) 8 Unit(s) SubCutaneous every morning  insulin lispro (HumaLOG) corrective regimen sliding scale   SubCutaneous three times a day before meals  levothyroxine Injectable 100 MICROGram(s) IV Push at bedtime        ACCESS DEVICES:  [ ] Peripheral IV  [x] Central Venous Line	[ ] R	[ ] L	[ ] IJ	[ ] Fem	[ ] SC	Placed:   [x] Arterial Line		[ ] R	[ ] L	[ ] Fem	[ ] Rad	[ ] Ax	Placed:   [ ] [facility] HD Access             [ ] R [ ] L [ ] IJ  [ ] Fem     Placed:  [x] Urinary Catheter, Date Placed:   Necessity of urinary, arterial, and venous catheters discussed.      CODE STATUS:     IMAGING:

## 2020-04-21 NOTE — CHART NOTE - NSCHARTNOTEFT_GEN_A_CORE
Nutrition Follow Up Note   Patient seen for: nutrition follow up on COVID ICU     Source: medical record, communication with team. Unable to speak to pt due to current airborne isolation contact precautions related to COVID-19. Pt extubated to NC, lethargic.    Chart reviewed, events noted. 64 yo female with HTN, DM, HLD, who presented with SOB and was found to be COVID+, required intubation and mechanical ventilation; extubated 4/19.    Diet Order: Diet, NPO with Tube Feed:   Tube Feeding Modality: Orogastric  Nepro with Carb Steady (NEPRORTH)  Total Volume for 24 Hours (mL): 720  Bolus  Total Volume of Bolus (mL):  180  Tube Feed Frequency: Every 6 hours   Tube Feed Start Time: 12:00  Bolus Feed Rate (mL per Hour): 180   Bolus Feed Duration (in Hours): 1 (04-10-20 @ 14:30)    CURRENT EN ORDER PROVIDES: 720 ml formula, 1296 calories (13 marisol/Kg per dosing wt 100Kg), 58 grams protein (1.3Gm/Kg per IBW 45.5Kg) - meets calorie needs    Nutrition Events:   - Enteral Nutrition: Pt previously tolerating Bolus EN: Nepro @ 180ml q6 hrs. Pt now NPO since 4/19 (for extubation). Pt failed bedside swallow evaluation, NGT placed 4/20 but EN not yet infusing per flowsheet.  - Propofol: discontinued  - Stage 3 MARIO on CKD; HD initiated 4/9 (-1300ml); Last HD 4/13 (-1000ml)  - Hyperphosphatemia noted; PhosLo discontinued  - Hyperglycemia being addressed with Lantus (8 units morning) and Humalog sliding scale     GI: Last BM 4/19. Bowel regimen: Dulcolax suppository 4/19.    Anthrpometric Measurements:   Height (cm): 152.4 (04-04-20 @ 04:42)  Weight (kg): 100 (04-03-20 @ 14:20)  BMI (kg/m2): 43.1 (04-04-20 @ 04:42)  IBW: 45.5 Kg    Medications: MEDICATIONS  (STANDING):  artificial  tears Solution 1 Drop(s) Both EYES two times a day  aspirin Suppository 300 milliGRAM(s) Rectal daily  dextrose 5%. 1000 milliLiter(s) (50 mL/Hr) IV Continuous <Continuous>  dextrose 50% Injectable 12.5 Gram(s) IV Push once  dextrose 50% Injectable 25 Gram(s) IV Push once  dextrose 50% Injectable 25 Gram(s) IV Push once  famotidine Injectable 20 milliGRAM(s) IV Push daily  heparin  Infusion. 700 Unit(s)/Hr (7 mL/Hr) IV Continuous <Continuous>  insulin glargine Injectable (LANTUS) 8 Unit(s) SubCutaneous every morning  insulin lispro (HumaLOG) corrective regimen sliding scale   SubCutaneous three times a day before meals  levothyroxine Injectable 100 MICROGram(s) IV Push at bedtime    MEDICATIONS  (PRN):  acetaminophen  Suppository .. 650 milliGRAM(s) Rectal every 6 hours PRN Temp greater or equal to 38C (100.4F), Mild Pain (1 - 3)  bisacodyl Suppository 10 milliGRAM(s) Rectal daily PRN Constipation  dextrose 40% Gel 15 Gram(s) Oral once PRN Blood Glucose LESS THAN 70 milliGRAM(s)/deciliter  glucagon  Injectable 1 milliGRAM(s) IntraMuscular once PRN Glucose LESS THAN 70 milligrams/deciliter  heparin   Injectable 8000 Unit(s) IV Push every 6 hours PRN For aPTT less than 40  heparin   Injectable 4000 Unit(s) IV Push every 6 hours PRN For aPTT between 40 - 57  hydrALAZINE Injectable 10 milliGRAM(s) IV Push every 6 hours PRN SBP>150 or DBP >110    Labs: 04-21 @ 03:33: Sodium 146<H>, Potassium 3.6, Calcium 8.8, Magnesium 2.5, Phosphorus 6.9<H>, <H>, Creatinine 4.24<H>, Glucose 122<H>, Alk Phos 854<H>, ALT/SGPT 108<H>, AST/SGOT 72<H>, Albumin 2.3<L>, Prealbumin --, Total Bilirubin 0.4, Hemoglobin 7.2<L>, Hematocrit 24.5<L>, Ferritin --, C-Reactive Protein --, Creatine Kinase <<27>  04-20 @ 14:19: Sodium 143, Potassium 3.6, Calcium 8.7, Magnesium 2.5, Phosphorus 6.5<H>, <H>, Creatinine 4.27<H>, Glucose 111<H>, Alk Phos 881<H>, ALT/SGPT 134<H>, AST/SGOT 103<H>, Albumin 2.2<L>, Prealbumin --, Total Bilirubin 0.4, Hemoglobin --, Hematocrit --, Ferritin --, C-Reactive Protein --, Creatine Kinase <<27>    Triglycerides, Serum: 98 mg/dL (04-20-20 @ 00:26)  Triglycerides, Serum: 129 mg/dL (04-17-20 @ 03:12)  Triglycerides, Serum: 82 mg/dL (04-15-20 @ 15:00)  Triglycerides, Serum: 121 mg/dL (04-15-20 @ 02:23)  Triglycerides, Serum: 180 mg/dL (04-13-20 @ 03:12)  Triglycerides, Serum: 144 mg/dL (04-11-20 @ 01:40)  Triglycerides, Serum: 157 mg/dL (04-10-20 @ 02:24)  Triglycerides, Serum: 142 mg/dL (04-08-20 @ 03:40)    POCT Blood Glucose.: 118 mg/dL (04-20-20 @ 16:43)  POCT Blood Glucose.: 117 mg/dL (04-20-20 @ 11:06)    Skin: no pressure injuries documented  Edema: 2+ generalized    Estimated Needs: with consideration for BMI>40, increased for extubation  Energy: 6437-5203 marisol/day (15-20 marisol/Kg per dosing wt 100Kg)  Protein: 73-82 Gm/day (1.6-1.8 Gm/Kg per IBW 45.5Kg)    Previous Nutrition Diagnosis: Overweight/Obesity  Nutrition Diagnosis is: ongoing, being addressed with hypocaloric EN regimen    New Nutrition Diagnosis:  none    Recommended Interventions:   1. Increase Bolus EN: Nepro @ 240ml q6 hrs; provides 960ml formula, 1728 calories (17 marisol/Kg per dosing wt 100Kg), 78 grams protein (1.7 Gm/Kg per IBW 45.5Kg)    Monitoring and Evaluation:   Continue to monitor nutrition provision and tolerance, weights, labs, skin integrity.   RD remains available upon request and will follow up per protocol.    Wendy Belcher, MS GAMA CDN [facility]; Pager # [phone] Nutrition Follow Up Note   Patient seen for: nutrition follow up on COVID ICU     Source: medical record, communication with team. Unable to speak to pt due to current airborne isolation contact precautions related to COVID-19. Pt extubated to NC, lethargic.    Chart reviewed, events noted. 66 yo female with HTN, DM, HLD, who presented with SOB and was found to be COVID+, required intubation and mechanical ventilation; extubated 4/19.    Diet Order: Diet, NPO with Tube Feed:   Tube Feeding Modality: Orogastric  Nepro with Carb Steady (NEPRORTH)  Total Volume for 24 Hours (mL): 720  Bolus  Total Volume of Bolus (mL):  180  Tube Feed Frequency: Every 6 hours   Tube Feed Start Time: 12:00  Bolus Feed Rate (mL per Hour): 180   Bolus Feed Duration (in Hours): 1 (04-10-20 @ 14:30)    CURRENT EN ORDER PROVIDES: 720 ml formula, 1296 calories (13 marisol/Kg per dosing wt 100Kg), 58 grams protein (1.3Gm/Kg per IBW 45.5Kg) - not meeting protein-calorie needs    Nutrition Events:   - Enteral Nutrition: Pt previously tolerating Bolus EN: Nepro @ 180ml q6 hrs. Pt now NPO since 4/19 (for extubation). Pt failed bedside swallow evaluation, NGT placed 4/20 but EN not yet infusing per flowsheet.  - Propofol: discontinued  - Stage 3 MARIO on CKD; HD initiated 4/9 (-1300ml); Last HD 4/13 (-1000ml)  - Hyperphosphatemia noted; PhosLo discontinued  - Hyperglycemia being addressed with Lantus (8 units morning) and Humalog sliding scale     GI: Last BM 4/19. Bowel regimen: Dulcolax suppository 4/19.    Anthrpometric Measurements:   Height (cm): 152.4 (04-04-20 @ 04:42)  Weight (kg): 100 (04-03-20 @ 14:20)  BMI (kg/m2): 43.1 (04-04-20 @ 04:42)  IBW: 45.5 Kg    Medications: MEDICATIONS  (STANDING):  artificial  tears Solution 1 Drop(s) Both EYES two times a day  aspirin Suppository 300 milliGRAM(s) Rectal daily  dextrose 5%. 1000 milliLiter(s) (50 mL/Hr) IV Continuous <Continuous>  dextrose 50% Injectable 12.5 Gram(s) IV Push once  dextrose 50% Injectable 25 Gram(s) IV Push once  dextrose 50% Injectable 25 Gram(s) IV Push once  famotidine Injectable 20 milliGRAM(s) IV Push daily  heparin  Infusion. 700 Unit(s)/Hr (7 mL/Hr) IV Continuous <Continuous>  insulin glargine Injectable (LANTUS) 8 Unit(s) SubCutaneous every morning  insulin lispro (HumaLOG) corrective regimen sliding scale   SubCutaneous three times a day before meals  levothyroxine Injectable 100 MICROGram(s) IV Push at bedtime    MEDICATIONS  (PRN):  acetaminophen  Suppository .. 650 milliGRAM(s) Rectal every 6 hours PRN Temp greater or equal to 38C (100.4F), Mild Pain (1 - 3)  bisacodyl Suppository 10 milliGRAM(s) Rectal daily PRN Constipation  dextrose 40% Gel 15 Gram(s) Oral once PRN Blood Glucose LESS THAN 70 milliGRAM(s)/deciliter  glucagon  Injectable 1 milliGRAM(s) IntraMuscular once PRN Glucose LESS THAN 70 milligrams/deciliter  heparin   Injectable 8000 Unit(s) IV Push every 6 hours PRN For aPTT less than 40  heparin   Injectable 4000 Unit(s) IV Push every 6 hours PRN For aPTT between 40 - 57  hydrALAZINE Injectable 10 milliGRAM(s) IV Push every 6 hours PRN SBP>150 or DBP >110    Labs: 04-21 @ 03:33: Sodium 146<H>, Potassium 3.6, Calcium 8.8, Magnesium 2.5, Phosphorus 6.9<H>, <H>, Creatinine 4.24<H>, Glucose 122<H>, Alk Phos 854<H>, ALT/SGPT 108<H>, AST/SGOT 72<H>, Albumin 2.3<L>, Prealbumin --, Total Bilirubin 0.4, Hemoglobin 7.2<L>, Hematocrit 24.5<L>, Ferritin --, C-Reactive Protein --, Creatine Kinase <<27>  04-20 @ 14:19: Sodium 143, Potassium 3.6, Calcium 8.7, Magnesium 2.5, Phosphorus 6.5<H>, <H>, Creatinine 4.27<H>, Glucose 111<H>, Alk Phos 881<H>, ALT/SGPT 134<H>, AST/SGOT 103<H>, Albumin 2.2<L>, Prealbumin --, Total Bilirubin 0.4, Hemoglobin --, Hematocrit --, Ferritin --, C-Reactive Protein --, Creatine Kinase <<27>    Triglycerides, Serum: 98 mg/dL (04-20-20 @ 00:26)  Triglycerides, Serum: 129 mg/dL (04-17-20 @ 03:12)  Triglycerides, Serum: 82 mg/dL (04-15-20 @ 15:00)  Triglycerides, Serum: 121 mg/dL (04-15-20 @ 02:23)  Triglycerides, Serum: 180 mg/dL (04-13-20 @ 03:12)  Triglycerides, Serum: 144 mg/dL (04-11-20 @ 01:40)  Triglycerides, Serum: 157 mg/dL (04-10-20 @ 02:24)  Triglycerides, Serum: 142 mg/dL (04-08-20 @ 03:40)    POCT Blood Glucose.: 118 mg/dL (04-20-20 @ 16:43)  POCT Blood Glucose.: 117 mg/dL (04-20-20 @ 11:06)    Skin: no pressure injuries documented  Edema: 2+ generalized    Estimated Needs: with consideration for BMI>40, increased for extubation  Energy: 5017-1058 marisol/day (15-20 marisol/Kg per dosing wt 100Kg)  Protein: 73-82 Gm/day (1.6-1.8 Gm/Kg per IBW 45.5Kg)    Previous Nutrition Diagnosis: Overweight/Obesity  Nutrition Diagnosis is: ongoing, being addressed with hypocaloric EN regimen    New Nutrition Diagnosis:  none    Recommended Interventions:   1. Increase Bolus EN: Nepro @ 240ml q6 hrs; provides 960ml formula, 1728 calories (17 marisol/Kg per dosing wt 100Kg), 78 grams protein (1.7 Gm/Kg per IBW 45.5Kg)    Monitoring and Evaluation:   Continue to monitor nutrition provision and tolerance, weights, labs, skin integrity.   RD remains available upon request and will follow up per protocol.    Wendy Belcher, MS RD CDN [facility]; Pager # [phone]

## 2020-04-21 NOTE — PROGRESS NOTE ADULT - SUBJECTIVE AND OBJECTIVE BOX
[facility] DIVISION OF KIDNEY DISEASES AND HYPERTENSION -- FOLLOW UP NOTE  --------------------------------------------------------------------------------  Chief Complaint:    24 hour events/subjective:        PAST HISTORY  --------------------------------------------------------------------------------  No significant changes to PMH, PSH, FHx, SHx, unless otherwise noted    ALLERGIES & MEDICATIONS  --------------------------------------------------------------------------------  Allergies    No Known Allergies    Intolerances      Standing Inpatient Medications  artificial  tears Solution 1 Drop(s) Both EYES two times a day  aspirin Suppository 300 milliGRAM(s) Rectal daily  dextrose 5%. 1000 milliLiter(s) IV Continuous <Continuous>  dextrose 50% Injectable 12.5 Gram(s) IV Push once  dextrose 50% Injectable 25 Gram(s) IV Push once  dextrose 50% Injectable 25 Gram(s) IV Push once  famotidine Injectable 20 milliGRAM(s) IV Push daily  heparin  Infusion. 700 Unit(s)/Hr IV Continuous <Continuous>  insulin glargine Injectable (LANTUS) 8 Unit(s) SubCutaneous every morning  insulin lispro (HumaLOG) corrective regimen sliding scale   SubCutaneous three times a day before meals  levothyroxine Injectable 100 MICROGram(s) IV Push at bedtime    PRN Inpatient Medications  acetaminophen  Suppository .. 650 milliGRAM(s) Rectal every 6 hours PRN  bisacodyl Suppository 10 milliGRAM(s) Rectal daily PRN  dextrose 40% Gel 15 Gram(s) Oral once PRN  glucagon  Injectable 1 milliGRAM(s) IntraMuscular once PRN  heparin   Injectable 8000 Unit(s) IV Push every 6 hours PRN  heparin   Injectable 4000 Unit(s) IV Push every 6 hours PRN  hydrALAZINE Injectable 10 milliGRAM(s) IV Push every 6 hours PRN    ROS: unable to do    VITALS/PHYSICAL EXAM  --------------------------------------------------------------------------------  T(C): 36.9 (04-21-20 @ 08:00), Max: 37 (04-21-20 @ 04:00)  HR: 80 (04-21-20 @ 08:00) (70 - 86)  BP: 131/50 (04-21-20 @ 08:00) (131/50 - 173/68)  RR: 17 (04-21-20 @ 08:00) (16 - 20)  SpO2: 100% (04-21-20 @ 08:00) (94% - 100%)  Wt(kg): --        04-20-20 @ 07:01  -  04-21-20 @ 07:00  --------------------------------------------------------  IN: 185 mL / OUT: 1550 mL / NET: -1365 mL    04-21-20 @ 07:01  -  04-21-20 @ 11:40  --------------------------------------------------------  IN: 21 mL / OUT: 0 mL / NET: 21 mL    PE: Due to COVID-19, not done and relied on primary team exam      LABS/STUDIES  --------------------------------------------------------------------------------              7.2    9.86  >-----------<  235      [04-21-20 @ 03:33]              24.5     146  |  103  |  104  ----------------------------<  122      [04-21-20 @ 03:33]  3.6   |  25  |  4.24        Ca     8.8     [04-21-20 @ 03:33]      Mg     2.5     [04-21-20 @ 03:33]      Phos  6.9     [04-21-20 @ 03:33]    TPro  5.3  /  Alb  2.3  /  TBili  0.4  /  DBili  x   /  AST  72  /  ALT  108  /  AlkPhos  854  [04-21-20 @ 03:33]    PT/INR: PT 12.4 , INR 1.08       [04-20-20 @ 00:26]  PTT: 106.3      [04-21-20 @ 03:33]    CK 16      [04-20-20 @ 00:26]        [04-20-20 @ 00:26]    Creatinine Trend:  SCr 4.24 [04-21 @ 03:33]  SCr 4.27 [04-20 @ 14:19]  SCr 4.47 [04-20 @ 00:26]  SCr 4.41 [04-19 @ 16:56]  SCr 4.57 [04-19 @ 00:43]    Urinalysis - [04-16-20 @ 11:45]      Color Yellow / Appearance Clear / SG 1.017 / pH 6.5      Gluc 500 mg/dL / Ketone Negative  / Bili Negative / Urobili Negative       Blood Negative / Protein 300 mg/dL / Leuk Est Negative / Nitrite Negative      RBC 6 / WBC 25 / Hyaline 4 / Gran  / Sq Epi  / Non Sq Epi 1 / Bacteria Negative    Urine Creatinine 46      [04-19-20 @ 00:43]  Urine Protein 151      [04-19-20 @ 00:43]    Ferritin 999      [04-21-20 @ 09:20]  TSH 4.06      [04-14-20 @ 16:12]  Lipid: chol --, TG 98, HDL --, LDL --      [04-20-20 @ 00:26]    HBsAb <3.0      [04-10-20 @ 00:19]  HBsAg Nonreact      [04-10-20 @ 00:19]  HBcAb Nonreact      [04-10-20 @ 00:19]  HCV 0.14, Nonreact      [04-10-20 @ 00:19]    C3 Complement 149      [04-18-20 @ 00:40]  C4 Complement 26      [04-18-20 @ 00:40]  anti-GBM <1.0      [04-17-20 @ 22:45]  PLA2R: SATINDER <2, IFA Negative      [04-17-20 @ 23:26]  Free Light Chains: kappa 11.75, lambda 11.19, ratio = 1.05      [04-18 @ 00:40]

## 2020-04-21 NOTE — PROGRESS NOTE ADULT - ATTENDING COMMENTS
Seen/examined. s/p successful extubation.  - tenuous resp status  - c/w hep gtt in the setting of hypercoagulability and renal failure  - renal following, no plan for biopsy. renal function stable with good urine output  - possible floor transfer tomorrow, if remains stable. Seen/examined. s/p successful extubation.  - tenuous resp status  - c/w hep gtt in the setting of hypercoagulability and renal failure  - renal following, no plan for biopsy. renal function stable with good urine output  - swallow evaluation  - can decrease aspirin to 81 daily  - possible floor transfer tomorrow, if remains stable.

## 2020-04-21 NOTE — PROGRESS NOTE ADULT - ASSESSMENT
65F PMHx DM2, HTN, HLD who present to ED for fever/dyspnea x 1 week.  Per HPI, pt desaturated at home O2 sat 70% which improved nasal cannula (another family member).  In ED hypoxia 70s on nasal cannula s/p intubation.  Covid positive with CXR b/l opacities. Given ceftriaxone, azithromycin, hydroxychlorquine.     Nephrology consulted for MARIO or MARIO on CKD.     MARIO/ Proteinuria    in setting septic shock Covid19 infection, low BP, also noted to have nephrotic range proteinuria  - s/p HD on 4/13, has stable labs since then, non-oliguric. No plan for HD today, will continue to reassess the need for RRT on daily basis- might require by tomorrow as BUN still rising.   - Can hold off on biopsy. She has history of proteinuria on past labs and rept labs show only 3gm of proteinuria compared to 9gm on admission      Metabolic acidosis   improved now    Hyperphosphatemia  Still high, can give Phos lo if possible TID

## 2020-04-21 NOTE — PROGRESS NOTE ADULT - ASSESSMENT
65y Female with COVID-19(+) pneumonia requiring intubation, extubated     Neuro   -  Awake, extubated   - Mental status- awake follows commands, talks "I have a sore throat"  - Slight Right sided weakness-CTH- No S/H/M    CV  - Asa  -  renal BX cancelled by renal  - Coreg and Norvasc  PO  - Hydralazine  changed to IV as she is NPO    Pulmonary  - extubated, O2 via n/c 5L 100 %  - AB.43/44/136/29   100%-before extubated  - Tolerated CPAP trial well  - CXR- unchanged b/l opacities    GI  - Nepro bolus feed 180ml Q 6 hours  -  - Failed bedside swallow eval, kaofeed placed  - pepcid 20mg IV QD  - Last BM on 4/15, lactulose added      - Renal cancelled biopsy   - Making urine .  - Will hold HD today as per renal   - Phoslo stopped  -keep inonized calcium >1.1. 1.1()  - Cr 4.41 > 4.47 ( )   - K+ 3.2 ( Potassium Cl 10meQ x 3 ) -     Heme   - Heparin gtt protocol  - H/H: 7.2/241 ()   - D-Dimer 999 > 844      ID  -Afebrile   -WBC stable, procal 0.39, lactate 0.8  - Cx sent blood from [facility] and pripheral urine culture and UA-NGTD  -Vanco level  17 ( )   - completed Meropenum end date   - s/p anakinra (4/10-)  - completed solumedrol 50mg IV q 8hrs (until )  restart for 5 more day   -  ACTEMRA  400mg IVPB x 1 given (d/w Dr Foote ID)   - completed plaquenil   - repeat COVID test sent  - still positive      ENDO  - /117    - Lantus decreased 8u BID to daily to prevent hypoglycemia   -Low continue HISS  - IV synthroid      Lines   - R IJ shiley (),  -removed L radial A-line  - L  IJ  triple lumen (4/3-) 65y Female with COVID-19(+) pneumonia requiring intubation, extubated     Neuro   - Awake, extubated   - Mental status- awake follows commands, talks "I have a sore throat"  - Slight Right sided weakness-CTH- No S/H/M    CV  - Asa  -  renal BX cancelled by renal  - Coreg and Norvasc  PO  - Hydralazine  changed to IV as she is NPO    Pulmonary  - extubated, O2 via n/c 5L 100 %  - AB.43/44/136/29   100%-before extubated  - Tolerated CPAP trial well  - CXR- unchanged b/l opacities    GI  - Nepro bolus feed 180ml Q 6 hours  -  - Failed bedside swallow eval, kaofeed placed  - pepcid 20mg IV QD  - Last BM on 4/15, lactulose added      - Renal cancelled biopsy   - Making urine .  - Will hold HD today as per renal   - Phoslo stopped  -keep inonized calcium >1.1. 1.1()  - Cr 4.41 > 4.47 ( )   - K+ 3.2 ( Potassium Cl 10meQ x 3 ) -     Heme   - Heparin gtt protocol  - H/H: 7.2/241 ()   - D-Dimer 999 > 844      ID  -Afebrile   -WBC stable, procal 0.39, lactate 0.8  - Cx sent blood from [facility] and pripheral urine culture and UA-NGTD  -Vanco level  17 ( )   - completed Meropenum end date   - s/p anakinra (4/10-)  - completed solumedrol 50mg IV q 8hrs (until )  restart for 5 more day   -  ACTEMRA  400mg IVPB x 1 given (d/w Dr Foote ID)   - completed plaquenil   - repeat COVID test sent  - still positive      ENDO  - /117    - Lantus decreased 8u BID to daily to prevent hypoglycemia   -Low continue HISS  - IV synthroid      Lines   - R IJ shiley (),  -removed L radial A-line  - L  IJ  triple lumen (4/3-) 65y Female with COVID-19(+) pneumonia requiring intubation, extubated     Neuro   -  Awake, extubated   - Mental status- awake follows commands, talks "yes" "no" "hello"  - Slight Right sided weakness-CTH () No S/H/M    CV  - Asa 81mg PO  -  renal BX cancelled by renal  - Coreg and Norvasc  PO  - Hydralazine  changed to IV as she is NPO    Pulmonary  - extubated , O2 via n/c 5L 100 %  - AB.43/44/136/29   100%-before extubated ()  - Tolerated CPAP trial well  - CXR- unchanged b/l opacities    GI  - Nepro bolus feed 180ml Q 6 hours  -  - Failed bedside swallow eval x2 days, kaofeed placed   - pepcid 20mg IV QD  - Last BM on 4/15, lactulose added  -LFTs trending down      - Renal cancelled biopsy   - Making urine .  - Renal will hold HD again today as per renal   - Phoslo stopped  -keep inonized calcium >1.1. 1.1()  - Cr 4.24 > 4.47 ( )   - K+ 3.2 () ( s/p Potassium Cl 10meQ x 3 ) -> 3.6 ()  -Na 146        -PO4 6.9    Heme   - Heparin gtt protocol. Decreased to 700U/hr for elevated ptt. ptt now 106.3 ().  - H/H: 7.2/24.5 Stable  - D-Dimer 540 < 844      ID  -Afebrile   -WBC stable, procal 0.32, lactate 0.9  - As of  Bcx, catheter Cx, and UCx NGTD.  - Cx sent blood from [facility] and pripheral urine culture and UA-NGTD  -Vanco level  17 ( )   - completed Meropenum end date   - s/p anakinra (4/10-)  - completed solumedrol 50mg IV q 8hrs (until )  restart for 5 more day   -  ACTEMRA  400mg IVPB x 1 given (d/w Dr Foote ID)   - completed plaquenil   - repeat COVID test sent  - still positive      ENDO  - , 154 today    - Lantus from 8U BID dosing to daily dosing to prevent hypoglycemia - C/w Lantus 8U daily   -Low continue HISS  - IV synthroid      Lines   - R CHELLY montenegro (),  -removed L radial A-line  - L  IJ  triple lumen (4/3-)

## 2020-04-22 LAB
4/8 RATIO: 1.73 RATIO — SIGNIFICANT CHANGE UP (ref 0.9–3.6)
ABS CD8: 228 /UL — SIGNIFICANT CHANGE UP (ref 142–740)
ALBUMIN SERPL ELPH-MCNC: 2.2 G/DL — LOW (ref 3.3–5)
ALP SERPL-CCNC: 1259 U/L — HIGH (ref 40–120)
ALT FLD-CCNC: 123 U/L — HIGH (ref 10–45)
AMMONIA BLD-MCNC: 33 UMOL/L — SIGNIFICANT CHANGE UP (ref 11–55)
ANION GAP SERPL CALC-SCNC: 17 MMOL/L — SIGNIFICANT CHANGE UP (ref 5–17)
APTT BLD: 82.6 SEC — HIGH (ref 27.5–36.3)
AST SERPL-CCNC: 159 U/L — HIGH (ref 10–40)
BILIRUB DIRECT SERPL-MCNC: 0.2 MG/DL — SIGNIFICANT CHANGE UP (ref 0–0.2)
BILIRUB INDIRECT FLD-MCNC: 0.1 MG/DL — LOW (ref 0.2–1)
BILIRUB SERPL-MCNC: 0.3 MG/DL — SIGNIFICANT CHANGE UP (ref 0.2–1.2)
BUN SERPL-MCNC: 108 MG/DL — HIGH (ref 7–23)
CALCIUM SERPL-MCNC: 9.2 MG/DL — SIGNIFICANT CHANGE UP (ref 8.4–10.5)
CD3 BLASTS SPEC-ACNC: 645 /UL — LOW (ref 672–1870)
CD3 BLASTS SPEC-ACNC: 76 % — SIGNIFICANT CHANGE UP (ref 59–83)
CD4 %: 46 % — SIGNIFICANT CHANGE UP (ref 30–62)
CD8 %: 27 % — SIGNIFICANT CHANGE UP (ref 12–36)
CHLORIDE SERPL-SCNC: 107 MMOL/L — SIGNIFICANT CHANGE UP (ref 96–108)
CO2 SERPL-SCNC: 26 MMOL/L — SIGNIFICANT CHANGE UP (ref 22–31)
CREAT ?TM UR-MCNC: 61 MG/DL — SIGNIFICANT CHANGE UP
CREAT SERPL-MCNC: 3.93 MG/DL — HIGH (ref 0.5–1.3)
CRP SERPL-MCNC: 14.2 MG/DL — HIGH (ref 0–0.4)
D DIMER BLD IA.RAPID-MCNC: 442 NG/ML DDU — HIGH
FERRITIN SERPL-MCNC: 1223 NG/ML — HIGH (ref 15–150)
GLUCOSE BLDC GLUCOMTR-MCNC: 170 MG/DL — HIGH (ref 70–99)
GLUCOSE BLDC GLUCOMTR-MCNC: 208 MG/DL — HIGH (ref 70–99)
GLUCOSE BLDC GLUCOMTR-MCNC: 226 MG/DL — HIGH (ref 70–99)
GLUCOSE BLDC GLUCOMTR-MCNC: 99 MG/DL — SIGNIFICANT CHANGE UP (ref 70–99)
GLUCOSE SERPL-MCNC: 210 MG/DL — HIGH (ref 70–99)
HCT VFR BLD CALC: 25.9 % — LOW (ref 34.5–45)
HGB BLD-MCNC: 7.7 G/DL — LOW (ref 11.5–15.5)
LDH SERPL L TO P-CCNC: 292 U/L — HIGH (ref 50–242)
MAGNESIUM SERPL-MCNC: 2.6 MG/DL — SIGNIFICANT CHANGE UP (ref 1.6–2.6)
MCHC RBC-ENTMCNC: 26.3 PG — LOW (ref 27–34)
MCHC RBC-ENTMCNC: 29.7 GM/DL — LOW (ref 32–36)
MCV RBC AUTO: 88.4 FL — SIGNIFICANT CHANGE UP (ref 80–100)
NRBC # BLD: 0 /100 WBCS — SIGNIFICANT CHANGE UP (ref 0–0)
PHOSPHATE SERPL-MCNC: 6.1 MG/DL — HIGH (ref 2.5–4.5)
PLATELET # BLD AUTO: 223 K/UL — SIGNIFICANT CHANGE UP (ref 150–400)
POTASSIUM SERPL-MCNC: 3.4 MMOL/L — LOW (ref 3.5–5.3)
POTASSIUM SERPL-SCNC: 3.4 MMOL/L — LOW (ref 3.5–5.3)
PROCALCITONIN SERPL-MCNC: 0.35 NG/ML — HIGH (ref 0.02–0.1)
PROT ?TM UR-MCNC: 835 MG/DL — HIGH (ref 0–12)
PROT SERPL-MCNC: 6.1 G/DL — SIGNIFICANT CHANGE UP (ref 6–8.3)
PROT/CREAT UR-RTO: 13.7 RATIO — HIGH (ref 0–0.2)
RBC # BLD: 2.93 M/UL — LOW (ref 3.8–5.2)
RBC # FLD: 16 % — HIGH (ref 10.3–14.5)
SODIUM SERPL-SCNC: 150 MMOL/L — HIGH (ref 135–145)
T-CELL CD4 SUBSET PNL BLD: 396 /UL — LOW (ref 489–1457)
TRIGL SERPL-MCNC: 106 MG/DL — SIGNIFICANT CHANGE UP (ref 10–149)
TROPONIN T, HIGH SENSITIVITY RESULT: 95 NG/L — HIGH (ref 0–51)
WBC # BLD: 10.92 K/UL — HIGH (ref 3.8–10.5)
WBC # FLD AUTO: 10.92 K/UL — HIGH (ref 3.8–10.5)

## 2020-04-22 PROCEDURE — 99232 SBSQ HOSP IP/OBS MODERATE 35: CPT | Mod: GC

## 2020-04-22 PROCEDURE — 99292 CRITICAL CARE ADDL 30 MIN: CPT

## 2020-04-22 PROCEDURE — 99291 CRITICAL CARE FIRST HOUR: CPT

## 2020-04-22 PROCEDURE — 99233 SBSQ HOSP IP/OBS HIGH 50: CPT | Mod: GC

## 2020-04-22 RX ORDER — INSULIN GLARGINE 100 [IU]/ML
8 INJECTION, SOLUTION SUBCUTANEOUS AT BEDTIME
Refills: 0 | Status: DISCONTINUED | OUTPATIENT
Start: 2020-04-23 | End: 2020-04-23

## 2020-04-22 RX ORDER — POTASSIUM CHLORIDE 20 MEQ
10 PACKET (EA) ORAL ONCE
Refills: 0 | Status: COMPLETED | OUTPATIENT
Start: 2020-04-22 | End: 2020-04-22

## 2020-04-22 RX ORDER — METOPROLOL TARTRATE 50 MG
25 TABLET ORAL EVERY 12 HOURS
Refills: 0 | Status: DISCONTINUED | OUTPATIENT
Start: 2020-04-22 | End: 2020-04-23

## 2020-04-22 RX ADMIN — LACTULOSE 20 GRAM(S): 10 SOLUTION ORAL at 00:53

## 2020-04-22 RX ADMIN — Medication 1 DROP(S): at 05:44

## 2020-04-22 RX ADMIN — Medication 81 MILLIGRAM(S): at 11:10

## 2020-04-22 RX ADMIN — Medication 10 MILLIEQUIVALENT(S): at 17:49

## 2020-04-22 RX ADMIN — LACTULOSE 20 GRAM(S): 10 SOLUTION ORAL at 05:44

## 2020-04-22 RX ADMIN — Medication 1 DROP(S): at 17:49

## 2020-04-22 RX ADMIN — Medication 2: at 11:10

## 2020-04-22 RX ADMIN — FAMOTIDINE 20 MILLIGRAM(S): 10 INJECTION INTRAVENOUS at 11:10

## 2020-04-22 RX ADMIN — Medication 100 MICROGRAM(S): at 23:13

## 2020-04-22 RX ADMIN — LACTULOSE 20 GRAM(S): 10 SOLUTION ORAL at 17:49

## 2020-04-22 RX ADMIN — LACTULOSE 20 GRAM(S): 10 SOLUTION ORAL at 11:10

## 2020-04-22 RX ADMIN — Medication 25 MILLIGRAM(S): at 17:49

## 2020-04-22 RX ADMIN — INSULIN GLARGINE 8 UNIT(S): 100 INJECTION, SOLUTION SUBCUTANEOUS at 10:38

## 2020-04-22 RX ADMIN — Medication 2: at 08:00

## 2020-04-22 RX ADMIN — LACTULOSE 20 GRAM(S): 10 SOLUTION ORAL at 23:19

## 2020-04-22 NOTE — CHART NOTE - NSCHARTNOTEFT_GEN_A_CORE
Pt scheduled for Renal Biopsy on 04/24/2020 ,  at  0130 PM  as per  Radiology RN  Pat , will need to be NPO  4 hrs Prior  and Heparin drip to be off 6 hrs prior to the procedure . DR Woodson to perform her Biopsy .  alfie vasquez NP-C [id number]

## 2020-04-22 NOTE — PROGRESS NOTE ADULT - ASSESSMENT
Assessment and Plan:   · Assessment		  65y Female with COVID-19(+) pneumonia requiring intubation, extubated     Neuro   -  Awake, extubated   - Mental status- awake follows commands, talks "yes" "no" "hello"  - Slight Right sided weakness-CTH () No S/H/M    CV  - Asa 81mg PO  -  renal BX cancelled by renal  - Coreg and Norvasc  PO  - Hydralazine  changed to IV as she is NPO    Pulmonary  - extubated , O2 via n/c 5L 100 %  - AB.43/44/136/29   100%-before extubated ()  - Tolerated CPAP trial well  - CXR- unchanged b/l opacities    GI  - Nepro bolus feed 180ml Q 6 hours  -  - Failed bedside swallow eval x2 days, kaofeed placed   - pepcid 20mg IV QD  - Last BM on 4/15, lactulose added  -LFTs trending down      - Renal cancelled biopsy   - Making urine .  - Renal will hold HD again today as per renal   - Phoslo stopped  -keep inonized calcium >1.1. 1.1()  - Cr 4.24 > 4.47 ( )   - K+ 3.2 () ( s/p Potassium Cl 10meQ x 3 ) -> 3.6 ()  -Na 146        -PO4 6.9    Heme   - Heparin gtt protocol. Decreased to 700U/hr for elevated ptt. ptt now 106.3 ().  - H/H: 7.2/24.5 Stable  - D-Dimer 540 < 844      ID  -Afebrile   -WBC stable, procal 0.32, lactate 0.9  - As of  Bcx, catheter Cx, and UCx NGTD.  - Cx sent blood from [facility] and pripheral urine culture and UA-NGTD  -Vanco level  17 ( )   - completed Meropenum end date   - s/p anakinra (4/10-)  - completed solumedrol 50mg IV q 8hrs (until )  restart for 5 more day   -  ACTEMRA  400mg IVPB x 1 given (d/w Dr Foote ID)   - completed plaquenil   - repeat COVID test sent  - still positive      ENDO  - , 154 today    - Lantus from 8U BID dosing to daily dosing to prevent hypoglycemia - C/w Lantus 8U daily   - Low continue HISS  - IV synthroid      Lines   - R CHELLY montenegro (),  -removed L radial A-line  - L  IJ  triple lumen (4/3-) Assessment and Plan:   · Assessment		  65y Female with COVID-19(+) pneumonia requiring intubation, extubated     Neuro   -  Awake, extubated   - Mental status- awake follows commands, talks "yes" "no" "hello"  - Slight Right sided weakness-CTH () No S/H/M    CV  - Asa 81mg PO  -  renal BX cancelled by renal  - Coreg and Norvasc  PO  - Hydralazine  changed to IV as she is NPO    Pulmonary  - extubated , O2 via n/c 5L 100 %  - AB.43/44/136/29   100%-before extubated ()  - Tolerated CPAP trial well  - CXR- unchanged b/l opacities    GI  - Nepro bolus feed 180ml Q 6 hours  -  - Failed bedside swallow eval x2 days, kaofeed placed   - pepcid 20mg IV QD  - Last BM on 4/15, lactulose added  -LFTs trending down      - Renal cancelled biopsy   - Making urine .  - Renal plans for HD today   - Phoslo stopped  -keep inonized calcium >1.1. 1.1()  - Cr 4.24 > 4.47 ( )   - K+ 3.2 () ( s/p Potassium Cl 10meQ x 3 ) -> 3.6 ()  -Na 146        -PO4 6.9    Heme   - Heparin gtt protocol. Decreased to 700U/hr for elevated ptt. ptt now 106.3 ().  - H/H:  7.7/25.9 Stable  - D-Dimer  Trending down to 442      ID  -Afebrile   -WBC  10.92, procal 0.32, lactate 0.9  - As of  Bcx, catheter Cx, and UCx NGTD.  - Cx sent blood from [facility] and pripheral urine culture and UA-NGTD  - completed Meropenum end date   - s/p anakinra (4/10-)  - completed solumedrol 50mg IV q 8hrs (until )  restart for 5 more day   -  ACTEMRA  400mg IVPB x 1 given (d/w Dr Foote ID)   - completed plaquenil   - repeat COVID test sent  - still positive      ENDO  - , 154 today    - Lantus from 8U BID dosing to daily dosing to prevent hypoglycemia - C/w Lantus 8U daily   - Low continue HISS  - IV synthroid      Lines   - R IJ moni (),  -removed L radial A-line  - L  IJ  triple lumen (4/3-) Assessment and Plan:   · Assessment		  65y Female with COVID-19(+) pneumonia requiring intubation, extubated 4/19    Neuro   -  Awake, extubated 4/19  - Mental status- awake follows commands, talks "yes" "no" "hello"  - Slight Right sided weakness-CTH (4/19) No S/H/M    CV  - Asa 81mg PO  -  renal BX cancelled by renal  - Added Lopressor today for better BP control   - Hydralazine  changed to IV as she is NPO    Pulmonary  -Extubated on 04/19 now on 3-4 NC     GI  - Nepro bolus feed 180ml Q 6 hours  -  - Failed bedside swallow eval x2 days, kaofeed placed 4/20  - pepcid 20mg IV QD  - Last BM on 4/22 lactulose added  - Alk  phos 1259 , Trend LFT , consider  US abdomen     - Renal cancelled biopsy   - Making urine .  - Renal plans for HD today   - Phoslo stopped  -keep inonized calcium >1.1. 1.1(4/19)  - Cr 4.24 > 4.47 ( 04/21)   - K+ 3.2 (4/20) ( s/p Potassium Cl 10meQ x 3 ) -> 3.6 (4/21)  -Na 146        -PO4 6.9    Heme   - Heparin gtt protocol. Decreased to 700U/hr for elevated ptt. ptt now 106.3 (4/21).  - H/H:  7.7/25.9 Stable  - D-Dimer  Trending down to 442      ID  -Afebrile   -WBC  10.92, procal 0.32, lactate 0.9  - As of 4/21 Bcx, catheter Cx, and UCx NGTD.  -4/16 Cx sent blood from [facility] and pripheral urine culture and UA-NGTD  - completed Meropenum end date 4/19  - s/p anakinra (4/10-4/13)  - completed solumedrol 50mg IV q 8hrs (until 4/11)  restart for 5 more day 4/16  - completed plaquenil   - repeat COVID test sent 4/13 - still positive      ENDO  -  C/w Lantus 8U daily   - Low continue HISS  - IV synthroid      Lines   - R IJ moni (4/8),  -removed L radial A-line  - L  IJ  triple lumen (4/3-4/13) Assessment and Plan:   · Assessment		  65y Female with COVID-19(+) pneumonia requiring intubation, extubated 4/19    Neuro   -  Awake, extubated 4/19  - Mental status- not following many commands  - Slight Right sided weakness-CTH (4/19) No S/H/M    CV  - Asa 81mg PO  -  renal BX cancelled by renal  - Added Lopressor today for better BP control   - Hydralazine  changed to IV as she is NPO    Pulmonary  -Extubated on 04/19 now on 3-4 NC     GI  - Nepro bolus feed 180ml Q 6 hours  -  - Failed bedside swallow eval x2 days, kaofeed placed 4/20  - pepcid 20mg IV QD  - Last BM on 4/22 lactulose added  - Alk  phos 1259 , Trend LFT , consider  US abdomen. likely with cholestasis associated with critical illness.       - Renal cancelled biopsy   - Making urine .  - Renal plans for HD today to improve uremia  - Phoslo stopped  -keep inonized calcium >1.1. 1.1(4/19)  - Cr 4.24 > 4.47 ( 04/21)   - K+ 3.2 (4/20) ( s/p Potassium Cl 10meQ x 3 ) -> 3.6 (4/21)  -Na 146        -PO4 6.9    Heme   - Heparin gtt protocol. Decreased to 700U/hr for elevated ptt. ptt now 106.3 (4/21).  - H/H:  7.7/25.9 Stable  - D-Dimer  Trending down to 442      ID  -Afebrile   -WBC  10.92, procal 0.32, lactate 0.9  - As of 4/21 Bcx, catheter Cx, and UCx NGTD.  -[facility] and UA-NGTD  - completed Meropenum end date 4/19  - s/p anakinra (4/10-4/13)  - completed solumedrol 50mg IV q 8hrs (until 4/11)  restart for 5 more day 4/16  - completed plaquenil   - repeat COVID test sent 4/13 - still positive      ENDO  -  C/w Lantus 8U daily   - Low continue HISS  - IV synthroid      Lines   - R IJ moni (4/8),  -removed L radial A-line  - L  IJ  triple lumen (4/3-4/13)

## 2020-04-22 NOTE — PROGRESS NOTE ADULT - NSREFPHYEXREFTO_GEN_ALL_CORE
Inpatient Physical Exam
Other
Inpatient Physical Exam
Other
Inpatient Physical Exam

## 2020-04-22 NOTE — PROGRESS NOTE ADULT - SUBJECTIVE AND OBJECTIVE BOX
HISTORY  65y Female with COVID-19(+) pneumonia requiring intubation.    24 HOUR EVENTS:    SUBJECTIVE/ROS: Due to intubation with sedation, subjective information was not able to be obtained from the patient. History was obtained, to the extent possible, from review of the chart and collateral sources of information.      NEURO  Exam: sedated, no acute distress  Meds: acetaminophen  Suppository .. 650 milliGRAM(s) Rectal every 6 hours PRN Temp greater or equal to 38C (100.4F), Mild Pain (1 - 3)    Adequacy of sedation and pain control has been assessed and adjusted.      RESPIRATORY  RR: 17 (17 - 24)  SpO2: 95% (94% - 100%)  Exam: without dissynchrony  Mechanical Ventilation:   Predicted Body Weight:  Tidal Volume/PBW ratio:  PaO2 to FiO2 ratio:  Extubation readiness assessed.    CARDIOVASCULAR  HR: 105 (80 - 105)  ABP: --  ABP(mean): --  Cardiac Rhythm: sinus  Most recent QTc:   Meds: hydrALAZINE Injectable PRN        GI/NUTRITION  Diet:   Most recent bowel movement:  Meds/stress ulcer prophylaxis: bisacodyl Suppository 10 milliGRAM(s) Rectal daily PRN Constipation  dextrose 5%. 1000 milliLiter(s) IV Continuous <Continuous>  famotidine Injectable 20 milliGRAM(s) IV Push daily  lactulose Syrup 20 Gram(s) Oral four times a day        GENITOURINARY  I&O's Detail    04-20 @ 07:01 - 04-21 @ 07:00  --------------------------------------------------------  IN:    heparin  Infusion.: 81 mL    heparin  Infusion.: 54 mL    heparin Infusion: 50 mL  Total IN: 185 mL    OUT:    Indwelling Catheter - Urethral: 1550 mL  Total OUT: 1550 mL    Total NET: -1365 mL      04-21 @ 07:01 - 04-22 @ 01:44  --------------------------------------------------------  IN:    Enteral Tube Flush: 60 mL    heparin  Infusion.: 119 mL    Nepro with Carb Steady: 360 mL  Total IN: 539 mL    OUT:    Indwelling Catheter - Urethral: 1060 mL  Total OUT: 1060 mL    Total NET: -521 mL          04-21    146<H>  |  103  |  104<H>  ----------------------------<  122<H>  3.6   |  25  |  4.24<H>    Ca    8.8      21 Apr 2020 03:33  Phos  6.9     04-21  Mg     2.5     04-21    TPro  5.3<L>  /  Alb  2.3<L>  /  TBili  0.4  /  DBili  x   /  AST  72<H>  /  ALT  108<H>  /  AlkPhos  854<H>  04-21    [x] Glass catheter, indication: urine output monitoring in critically ill patient.  Meds: dextrose 5%. 1000 milliLiter(s) IV Continuous <Continuous>    Creatine Kinase, Serum: 16 (04-20-20 @ 00:26)        HEMATOLOGIC  Meds/VTE prophylaxis: aspirin  chewable 81 milliGRAM(s) Oral daily  heparin   Injectable 8000 Unit(s) IV Push every 6 hours PRN  heparin   Injectable 4000 Unit(s) IV Push every 6 hours PRN  heparin  Infusion. 700 Unit(s)/Hr IV Continuous <Continuous>                          7.7    10.92 )-----------( 223      ( 22 Apr 2020 01:25 )             25.9     PTT - ( 21 Apr 2020 12:32 )  PTT:70.0 sec  11393-48-20 @ 09:20        INFECTIOUS DISEASES  T(C): 36.4, Max: 37 (04-21-20 @ 04:00)  WBC Count: 10.92 (04-22-20 @ 01:25)  WBC Count: 9.86 (04-21-20 @ 03:33)    Recent Cultures:  Specimen Source: .Blood Blood-Peripheral, 04-16 @ 14:11; Results   No Growth Final; Gram Stain: --; Organism: --  Specimen Source: .Urine Catheterized, 04-16 @ 14:07; Results   No growth; Gram Stain: --; Organism: --    Meds:   Procalcitonin, Serum: 0.32 ng/mL (04-21-20 @ 03:33)        ENDOCRINE  Fingersticks: 214, 181, 154, 124, 118  Meds: dextrose 40% Gel 15 Gram(s) Oral once PRN  dextrose 50% Injectable 12.5 Gram(s) IV Push once  dextrose 50% Injectable 25 Gram(s) IV Push once  dextrose 50% Injectable 25 Gram(s) IV Push once  glucagon  Injectable 1 milliGRAM(s) IntraMuscular once PRN  insulin glargine Injectable (LANTUS) 8 Unit(s) SubCutaneous every morning  insulin lispro (HumaLOG) corrective regimen sliding scale   SubCutaneous three times a day before meals  levothyroxine Injectable 100 MICROGram(s) IV Push at bedtime        ACCESS DEVICES:  [ ] Peripheral IV  [x] Central Venous Line	[ ] R	[ ] L	[ ] IJ	[ ] Fem	[ ] SC	Placed:   [x] Arterial Line		[ ] R	[ ] L	[ ] Fem	[ ] Rad	[ ] Ax	Placed:   [ ] [facility] HD Access             [ ] R [ ] L [ ] IJ  [ ] Fem     Placed:  [x] Urinary Catheter, Date Placed:   Necessity of urinary, arterial, and venous catheters discussed.      CODE STATUS:     IMAGING: HISTORY  65y Female with COVID-19(+) pneumonia requiring intubation.    24 HOUR EVENTS:    SUBJECTIVE/ROS: Due to intubation with sedation, subjective information was not able to be obtained from the patient. History was obtained, to the extent possible, from review of the chart and collateral sources of information.      NEURO  Exam: Lethargic , arousable to repeated stimu;i   Meds: acetaminophen  Suppository .. 650 milliGRAM(s) Rectal every 6 hours PRN Temp greater or equal to 38C (100.4F), Mild Pain (1 - 3)        RESPIRATORY  RR: 17 (17 - 24)  SpO2: 95% (94% - 100%)  Exam: Rhonchi       CARDIOVASCULAR  HR: 105 (80 - 105)  ABP: --  ABP(mean): --  Cardiac Rhythm: sinus  Most recent QTc:   Meds: hydrALAZINE Injectable PRN        GI/NUTRITION  Diet:   Most recent bowel movement:  Meds/stress ulcer prophylaxis: bisacodyl Suppository 10 milliGRAM(s) Rectal daily PRN Constipation  dextrose 5%. 1000 milliLiter(s) IV Continuous <Continuous>  famotidine Injectable 20 milliGRAM(s) IV Push daily  lactulose Syrup 20 Gram(s) Oral four times a day        GENITOURINARY  I&O's Detail    04-20 @ 07:01  -  04-21 @ 07:00  --------------------------------------------------------  IN:    heparin  Infusion.: 81 mL    heparin  Infusion.: 54 mL    heparin Infusion: 50 mL  Total IN: 185 mL    OUT:    Indwelling Catheter - Urethral: 1550 mL  Total OUT: 1550 mL    Total NET: -1365 mL      04-21 @ 07:01  -  04-22 @ 01:44  --------------------------------------------------------  IN:    Enteral Tube Flush: 60 mL    heparin  Infusion.: 119 mL    Nepro with Carb Steady: 360 mL  Total IN: 539 mL    OUT:    Indwelling Catheter - Urethral: 1060 mL  Total OUT: 1060 mL    Total NET: -521 mL          04-21    146<H>  |  103  |  104<H>  ----------------------------<  122<H>  3.6   |  25  |  4.24<H>    Ca    8.8      21 Apr 2020 03:33  Phos  6.9     04-21  Mg     2.5     04-21    TPro  5.3<L>  /  Alb  2.3<L>  /  TBili  0.4  /  DBili  x   /  AST  72<H>  /  ALT  108<H>  /  AlkPhos  854<H>  04-21    [x] Glass catheter, indication: urine output monitoring in critically ill patient.  Meds: dextrose 5%. 1000 milliLiter(s) IV Continuous <Continuous>    Creatine Kinase, Serum: 16 (04-20-20 @ 00:26)        HEMATOLOGIC  Meds/VTE prophylaxis: aspirin  chewable 81 milliGRAM(s) Oral daily  heparin   Injectable 8000 Unit(s) IV Push every 6 hours PRN  heparin   Injectable 4000 Unit(s) IV Push every 6 hours PRN  heparin  Infusion. 700 Unit(s)/Hr IV Continuous <Continuous>                          7.7    10.92 )-----------( 223      ( 22 Apr 2020 01:25 )             25.9     PTT - ( 21 Apr 2020 12:32 )  PTT:70.0 sec  35751-69-87 @ 09:20        INFECTIOUS DISEASES  T(C): 36.4, Max: 37 (04-21-20 @ 04:00)  WBC Count: 10.92 (04-22-20 @ 01:25)  WBC Count: 9.86 (04-21-20 @ 03:33)    Recent Cultures:  Specimen Source: .Blood Blood-Peripheral, 04-16 @ 14:11; Results   No Growth Final; Gram Stain: --; Organism: --  Specimen Source: .Urine Catheterized, 04-16 @ 14:07; Results   No growth; Gram Stain: --; Organism: --    Meds:   Procalcitonin, Serum: 0.32 ng/mL (04-21-20 @ 03:33)        ENDOCRINE  Fingersticks: 214, 181, 154, 124, 118  Meds: dextrose 40% Gel 15 Gram(s) Oral once PRN  dextrose 50% Injectable 12.5 Gram(s) IV Push once  dextrose 50% Injectable 25 Gram(s) IV Push once  dextrose 50% Injectable 25 Gram(s) IV Push once  glucagon  Injectable 1 milliGRAM(s) IntraMuscular once PRN  insulin glargine Injectable (LANTUS) 8 Unit(s) SubCutaneous every morning  insulin lispro (HumaLOG) corrective regimen sliding scale   SubCutaneous three times a day before meals  levothyroxine Injectable 100 MICROGram(s) IV Push at bedtime        ACCESS DEVICES:  [ ] Peripheral IV  [x] Central Venous Line	[ ] R	[ ] L	[ ] IJ	[ ] Fem	[ ] SC	Placed:   [x] Arterial Line		[ ] R	[ ] L	[ ] Fem	[ ] Rad	[ ] Ax	Placed:   [ ] [facility] Access             [ ] R [ ] L [ ] IJ  [ ] Fem     Placed:  [x] Urinary Catheter, Date Placed:   Necessity of urinary, arterial, and venous catheters discussed.      CODE STATUS:     IMAGING: HISTORY  65y Female with COVID-19(+) pneumonia requiring intubation.    24 HOUR EVENTS:    SUBJECTIVE/ROS: Due to intubation with sedation, subjective information was not able to be obtained from the patient. History was obtained, to the extent possible, from review of the chart and collateral sources of information.      NEURO  Exam: Lethargic , arousable to repeated stimuli  Meds: acetaminophen  Suppository .. 650 milliGRAM(s) Rectal every 6 hours PRN Temp greater or equal to 38C (100.4F), Mild Pain (1 - 3)        RESPIRATORY  RR: 17 (17 - 24)  SpO2: 95% (94% - 100%)  Exam: Rhonchi       CARDIOVASCULAR  HR: 105 (80 - 105)  ABP: --  ABP(mean): --  Cardiac Rhythm: sinus  Meds: hydrALAZINE Injectable PRN        GI/NUTRITION  Diet:   Most recent bowel movement: 04/22  Meds/stress ulcer prophylaxis: bisacodyl Suppository 10 milliGRAM(s) Rectal daily PRN Constipation  dextrose 5%. 1000 milliLiter(s) IV Continuous <Continuous>  famotidine Injectable 20 milliGRAM(s) IV Push daily  lactulose Syrup 20 Gram(s) Oral four times a day        GENITOURINARY  I&O's Detail    04-20 @ 07:01 - 04-21 @ 07:00  --------------------------------------------------------  IN:    heparin  Infusion.: 81 mL    heparin  Infusion.: 54 mL    heparin Infusion: 50 mL  Total IN: 185 mL    OUT:    Indwelling Catheter - Urethral: 1550 mL  Total OUT: 1550 mL    Total NET: -1365 mL      04-21 @ 07:01 - 04-22 @ 01:44  --------------------------------------------------------  IN:    Enteral Tube Flush: 60 mL    heparin  Infusion.: 119 mL    Nepro with Carb Steady: 360 mL  Total IN: 539 mL    OUT:    Indwelling Catheter - Urethral: 1060 mL  Total OUT: 1060 mL    Total NET: -521 mL          04-21    146<H>  |  103  |  104<H>  ----------------------------<  122<H>  3.6   |  25  |  4.24<H>    Ca    8.8      21 Apr 2020 03:33  Phos  6.9     04-21  Mg     2.5     04-21    TPro  5.3<L>  /  Alb  2.3<L>  /  TBili  0.4  /  DBili  x   /  AST  72<H>  /  ALT  108<H>  /  AlkPhos  854<H>  04-21    [x] Glass catheter, indication: urine output monitoring in critically ill patient.  Meds: dextrose 5%. 1000 milliLiter(s) IV Continuous <Continuous>    Creatine Kinase, Serum: 16 (04-20-20 @ 00:26)        HEMATOLOGIC  Meds/VTE prophylaxis: aspirin  chewable 81 milliGRAM(s) Oral daily  heparin   Injectable 8000 Unit(s) IV Push every 6 hours PRN  heparin   Injectable 4000 Unit(s) IV Push every 6 hours PRN  heparin  Infusion. 700 Unit(s)/Hr IV Continuous <Continuous>                          7.7    10.92 )-----------( 223      ( 22 Apr 2020 01:25 )             25.9     PTT - ( 21 Apr 2020 12:32 )  PTT:70.0 sec  99904-21-20 @ 09:20        INFECTIOUS DISEASES  T(C): 36.4, Max: 37 (04-21-20 @ 04:00)  WBC Count: 10.92 (04-22-20 @ 01:25)  WBC Count: 9.86 (04-21-20 @ 03:33)    Recent Cultures:  Specimen Source: .Blood Blood-Peripheral, 04-16 @ 14:11; Results   No Growth Final; Gram Stain: --; Organism: --  Specimen Source: .Urine Catheterized, 04-16 @ 14:07; Results   No growth; Gram Stain: --; Organism: --    Meds:   Procalcitonin, Serum: 0.32 ng/mL (04-21-20 @ 03:33)        ENDOCRINE  Fingersticks: 214, 181, 154, 124, 118  Meds: dextrose 40% Gel 15 Gram(s) Oral once PRN  dextrose 50% Injectable 12.5 Gram(s) IV Push once  dextrose 50% Injectable 25 Gram(s) IV Push once  dextrose 50% Injectable 25 Gram(s) IV Push once  glucagon  Injectable 1 milliGRAM(s) IntraMuscular once PRN  insulin glargine Injectable (LANTUS) 8 Unit(s) SubCutaneous every morning  insulin lispro (HumaLOG) corrective regimen sliding scale   SubCutaneous three times a day before meals  levothyroxine Injectable 100 MICROGram(s) IV Push at bedtime        ACCESS DEVICES:  [ ] Peripheral IV  [x] Central Venous Line	[ ] R	[ ] L	[ ] IJ	[ ] Fem	[ ] SC	Placed:   [x] Arterial Line		[ ] R	[ ] L	[ ] Fem	[ ] Rad	[ ] Ax	Placed:   [ ] [facility] Access             [ ] R [ ] L [ ] IJ  [ ] Fem     Placed:  [x] Urinary Catheter, Date Placed:   Necessity of urinary, arterial, and venous catheters discussed.      CODE STATUS:     IMAGING:

## 2020-04-22 NOTE — PROGRESS NOTE ADULT - SUBJECTIVE AND OBJECTIVE BOX
[facility] DIVISION OF KIDNEY DISEASES AND HYPERTENSION -- FOLLOW UP NOTE  --------------------------------------------------------------------------------  65F PMHx DM2, HTN, HLD s/p intubation. Covid positive. Nephrology consulted for MARIO. Last HD 4/15/20.    Pt. extubated however poor mental status.     PAST HISTORY  --------------------------------------------------------------------------------  No significant changes to PMH, PSH, FHx, SHx, unless otherwise noted    ALLERGIES & MEDICATIONS  --------------------------------------------------------------------------------  Allergies    No Known Allergies    Intolerances      Standing Inpatient Medications  artificial  tears Solution 1 Drop(s) Both EYES two times a day  aspirin  chewable 81 milliGRAM(s) Oral daily  dextrose 5%. 1000 milliLiter(s) IV Continuous <Continuous>  dextrose 50% Injectable 12.5 Gram(s) IV Push once  dextrose 50% Injectable 25 Gram(s) IV Push once  dextrose 50% Injectable 25 Gram(s) IV Push once  famotidine Injectable 20 milliGRAM(s) IV Push daily  heparin  Infusion. 700 Unit(s)/Hr IV Continuous <Continuous>  insulin glargine Injectable (LANTUS) 8 Unit(s) SubCutaneous every morning  insulin lispro (HumaLOG) corrective regimen sliding scale   SubCutaneous three times a day before meals  lactulose Syrup 20 Gram(s) Oral four times a day  levothyroxine Injectable 100 MICROGram(s) IV Push at bedtime    PRN Inpatient Medications  acetaminophen  Suppository .. 650 milliGRAM(s) Rectal every 6 hours PRN  bisacodyl Suppository 10 milliGRAM(s) Rectal daily PRN  dextrose 40% Gel 15 Gram(s) Oral once PRN  glucagon  Injectable 1 milliGRAM(s) IntraMuscular once PRN  heparin   Injectable 8000 Unit(s) IV Push every 6 hours PRN  heparin   Injectable 4000 Unit(s) IV Push every 6 hours PRN  hydrALAZINE Injectable 10 milliGRAM(s) IV Push every 6 hours PRN      REVIEW OF SYSTEMS  --------------------------------------------------------------------------------  Unable to obtain    VITALS/PHYSICAL EXAM  --------------------------------------------------------------------------------  T(C): 37.6 (04-22-20 @ 11:00), Max: 37.9 (04-22-20 @ 00:00)  HR: 101 (04-22-20 @ 11:00) (86 - 107)  BP: 153/56 (04-22-20 @ 11:00) (100/30 - 181/69)  RR: 22 (04-22-20 @ 11:00) (17 - 24)  SpO2: 95% (04-22-20 @ 11:00) (95% - 100%)  Wt(kg): --        04-21-20 @ 07:01  -  04-22-20 @ 07:00  --------------------------------------------------------  IN: 1008 mL / OUT: 2115 mL / NET: -1107 mL    04-22-20 @ 07:01  -  04-22-20 @ 11:44  --------------------------------------------------------  IN: 215 mL / OUT: 400 mL / NET: -185 mL    Physical Exam: Deferred due to COVID-19 positivity, and preservation of PPE.     LABS/STUDIES  --------------------------------------------------------------------------------              7.7    10.92 >-----------<  223      [04-22-20 @ 01:25]              25.9     150  |  107  |  108  ----------------------------<  210      [04-22-20 @ 01:25]  3.4   |  26  |  3.93        Ca     9.2     [04-22-20 @ 01:25]      Mg     2.6     [04-22-20 @ 01:25]      Phos  6.1     [04-22-20 @ 01:25]    TPro  6.1  /  Alb  2.2  /  TBili  0.3  /  DBili  0.2  /  AST  159  /  ALT  123  /  AlkPhos  1259  [04-22-20 @ 01:25]      PTT: 82.6       [04-22-20 @ 01:25]          [04-22-20 @ 01:25]    Creatinine Trend:  SCr 3.93 [04-22 @ 01:25]  SCr 4.24 [04-21 @ 03:33]  SCr 4.27 [04-20 @ 14:19]  SCr 4.47 [04-20 @ 00:26]  SCr 4.41 [04-19 @ 16:56]

## 2020-04-22 NOTE — CHART NOTE - NSCHARTNOTEFT_GEN_A_CORE
PICU Course:  65 year old woman PMHx DM2, HTN, HLD who presented to ED for fever/dyspnea x 1 week c/b AHRF 2/2 COVID-19 s/o intubation now extubated with course further complicated by ARF requiring HD 4/8/20.    For follow up:  -Monitor mental status, may be 2/2 to elevated BUN  -Increasing LFTs, normal ammonia   -consider  US abdomen to r/o Angelica cystitis as per primary team  -speech and swallow eval   - Pt  ambulatory prior to admission , if MS not improved consider MR brain , Neuro eval  and Physical therapy to further assess Pt's  functional status   -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 , plans for Renal Biopsy Friday  with Radiology DR Kelli ASA on Hold from 4/22/2020 , please follow up with Renal and Radiology  regarding  Kidney Biopsy. Hold hep gtt four hour prior to procedure.      Esteban Lou M.D.  MAR [id number]

## 2020-04-22 NOTE — CHART NOTE - NSCHARTNOTEFT_GEN_A_CORE
Patient examined and case reviewed in detail on bedside rounds as reflected in daily progress note from medical team including review of ventilator setup and related medical management issues  Patient on ventilator with severe COVID-19 pneumonia.  Assessment Severe COVID PNA Vent settings optimized

## 2020-04-22 NOTE — CHART NOTE - NSCHARTNOTEFT_GEN_A_CORE
MICU Transfer Note    Transfer from: MICU to medical surgical floor     Transfer to: ( x ) Medicine    (  ) Telemetry     (   ) RCU        (    ) Palliative         (   ) Stroke Unit          (   ) __________________    [facility]          ASSESSMENT & PLAN:             For Followup:          Vital Signs Last 24 Hrs  T(C): 37.6 (22 Apr 2020 11:00), Max: 37.9 (22 Apr 2020 00:00)  T(F): 99.6 (22 Apr 2020 11:00), Max: 100.3 (22 Apr 2020 00:00)  HR: 101 (22 Apr 2020 11:00) (96 - 107)  BP: 153/56 (22 Apr 2020 11:00) (100/30 - 181/69)  BP(mean): 85 (22 Apr 2020 11:00) (49 - 101)  RR: 22 (22 Apr 2020 11:00) (17 - 24)  SpO2: 95% (22 Apr 2020 11:00) (95% - 100%)  I&O's Summary    21 Apr 2020 07:01  -  22 Apr 2020 07:00  --------------------------------------------------------  IN: 1008 mL / OUT: 2115 mL / NET: -1107 mL    22 Apr 2020 07:01  -  22 Apr 2020 12:26  --------------------------------------------------------  IN: 215 mL / OUT: 400 mL / NET: -185 mL        MEDICATIONS  (STANDING):  artificial  tears Solution 1 Drop(s) Both EYES two times a day  aspirin  chewable 81 milliGRAM(s) Oral daily  dextrose 5%. 1000 milliLiter(s) (50 mL/Hr) IV Continuous <Continuous>  dextrose 50% Injectable 12.5 Gram(s) IV Push once  dextrose 50% Injectable 25 Gram(s) IV Push once  dextrose 50% Injectable 25 Gram(s) IV Push once  famotidine Injectable 20 milliGRAM(s) IV Push daily  heparin  Infusion. 700 Unit(s)/Hr (7 mL/Hr) IV Continuous <Continuous>  insulin glargine Injectable (LANTUS) 8 Unit(s) SubCutaneous every morning  insulin lispro (HumaLOG) corrective regimen sliding scale   SubCutaneous three times a day before meals  lactulose Syrup 20 Gram(s) Oral four times a day  levothyroxine Injectable 100 MICROGram(s) IV Push at bedtime    MEDICATIONS  (PRN):  acetaminophen  Suppository .. 650 milliGRAM(s) Rectal every 6 hours PRN Temp greater or equal to 38C (100.4F), Mild Pain (1 - 3)  bisacodyl Suppository 10 milliGRAM(s) Rectal daily PRN Constipation  dextrose 40% Gel 15 Gram(s) Oral once PRN Blood Glucose LESS THAN 70 milliGRAM(s)/deciliter  glucagon  Injectable 1 milliGRAM(s) IntraMuscular once PRN Glucose LESS THAN 70 milligrams/deciliter  heparin   Injectable 8000 Unit(s) IV Push every 6 hours PRN For aPTT less than 40  heparin   Injectable 4000 Unit(s) IV Push every 6 hours PRN For aPTT between 40 - 57  hydrALAZINE Injectable 10 milliGRAM(s) IV Push every 6 hours PRN SBP>150 or DBP >110        LABS                                            7.7                   Neurophils% (auto):   x      (04-22 @ 01:25):    10.92)-----------(223          Lymphocytes% (auto):  x                                             25.9                   Eosinphils% (auto):   x        Manual%: Neutrophils x    ; Lymphocytes x    ; Eosinophils x    ; Bands%: x    ; Blasts x                                    150    |  107    |  108                 Calcium: 9.2   / iCa: x      (04-22 @ 01:25)    ----------------------------<  210       Magnesium: 2.6                              3.4     |  26     |  3.93             Phosphorous: 6.1      TPro  6.1    /  Alb  2.2    /  TBili  0.3    /  DBili  0.2    /  AST  159    /  ALT  123    /  AlkPhos  1259   22 Apr 2020 01:25    ( 04-22 @ 01:25 )   PT: x    ;   INR: x      aPTT: 82.6 sec MICU Transfer Note    Transfer from: PICU  to medical surgical floor     Transfer to: ( x ) Medicine    (  ) Telemetry     (   ) RCU        (    ) Palliative         (   ) Stroke Unit          (   ) __________________    Accepting physican:   PCP DR Alfredo Urbina  in [place]  , as  per  hcp Eliseo COLE at [phone]     MICU COURSE:  Intubated on 04/3/2020,                         : Extubated on 04/19                        : New to HD started on Hd on 04/8/2020                                  ASSESSMENT & PLAN:  65 yr old female  PMHX of DM type 2 , HTN  , HLD  and CKD   (now on Hd  form this admission ) p/w sob  found to have COVID 19  now extubated being transferred to floor     For Follow up :     :  Pt encephalopathic  likely  from Increased BUN planned for HD today     : Neuro check q 4     : trend LFT    : f/u Ammonia level     : consider  US abdomen to r/o Angelica cystitis     : Monitor for fevers     ; speech and swallow eval     : Pt  ambulatory prior to admission , if MS not improved  consider MR brain , Neuro eval  and Physical therapy to further assess Pt's  functional status           Vital Signs Last 24 Hrs  T(C): 37.6 (22 Apr 2020 11:00), Max: 37.9 (22 Apr 2020 00:00)  T(F): 99.6 (22 Apr 2020 11:00), Max: 100.3 (22 Apr 2020 00:00)  HR: 101 (22 Apr 2020 11:00) (96 - 107)  BP: 153/56 (22 Apr 2020 11:00) (100/30 - 181/69)  BP(mean): 85 (22 Apr 2020 11:00) (49 - 101)  RR: 22 (22 Apr 2020 11:00) (17 - 24)  SpO2: 95% (22 Apr 2020 11:00) (95% - 100%)  I&O's Summary    21 Apr 2020 07:01  -  22 Apr 2020 07:00  --------------------------------------------------------  IN: 1008 mL / OUT: 2115 mL / NET: -1107 mL    22 Apr 2020 07:01  -  22 Apr 2020 12:26  --------------------------------------------------------  IN: 215 mL / OUT: 400 mL / NET: -185 mL        MEDICATIONS  (STANDING):  artificial  tears Solution 1 Drop(s) Both EYES two times a day  aspirin  chewable 81 milliGRAM(s) Oral daily  dextrose 5%. 1000 milliLiter(s) (50 mL/Hr) IV Continuous <Continuous>  dextrose 50% Injectable 12.5 Gram(s) IV Push once  dextrose 50% Injectable 25 Gram(s) IV Push once  dextrose 50% Injectable 25 Gram(s) IV Push once  famotidine Injectable 20 milliGRAM(s) IV Push daily  heparin  Infusion. 700 Unit(s)/Hr (7 mL/Hr) IV Continuous <Continuous>  insulin glargine Injectable (LANTUS) 8 Unit(s) SubCutaneous every morning  insulin lispro (HumaLOG) corrective regimen sliding scale   SubCutaneous three times a day before meals  lactulose Syrup 20 Gram(s) Oral four times a day  levothyroxine Injectable 100 MICROGram(s) IV Push at bedtime    MEDICATIONS  (PRN):  acetaminophen  Suppository .. 650 milliGRAM(s) Rectal every 6 hours PRN Temp greater or equal to 38C (100.4F), Mild Pain (1 - 3)  bisacodyl Suppository 10 milliGRAM(s) Rectal daily PRN Constipation  dextrose 40% Gel 15 Gram(s) Oral once PRN Blood Glucose LESS THAN 70 milliGRAM(s)/deciliter  glucagon  Injectable 1 milliGRAM(s) IntraMuscular once PRN Glucose LESS THAN 70 milligrams/deciliter  heparin   Injectable 8000 Unit(s) IV Push every 6 hours PRN For aPTT less than 40  heparin   Injectable 4000 Unit(s) IV Push every 6 hours PRN For aPTT between 40 - 57  hydrALAZINE Injectable 10 milliGRAM(s) IV Push every 6 hours PRN SBP>150 or DBP >110        LABS                                            7.7                   Neurophils% (auto):   x      (04-22 @ 01:25):    10.92)-----------(223          Lymphocytes% (auto):  x                                             25.9                   Eosinphils% (auto):   x        Manual%: Neutrophils x    ; Lymphocytes x    ; Eosinophils x    ; Bands%: x    ; Blasts x                                    150    |  107    |  108                 Calcium: 9.2   / iCa: x      (04-22 @ 01:25)    ----------------------------<  210       Magnesium: 2.6                              3.4     |  26     |  3.93             Phosphorous: 6.1      TPro  6.1    /  Alb  2.2    /  TBili  0.3    /  DBili  0.2    /  AST  159    /  ALT  123    /  AlkPhos  1259   22 Apr 2020 01:25    ( 04-22 @ 01:25 )   PT: x    ;   INR: x      aPTT: 82.6 sec MICU Transfer Note    Transfer from: PICU  to medical surgical floor     Transfer to: ( x ) Medicine    (  ) Telemetry     (   ) RCU        (    ) Palliative         (   ) Stroke Unit          (   ) __________________    Accepting physican:   PCP DR Alfredo Urbina  in [place]  , as  per  hcp Eliseo COLE at [phone]     MICU COURSE:  Intubated on 04/3/2020,                         : Extubated on 04/19                        : New to HD started on Hd on 04/8/2020                                  ASSESSMENT & PLAN:  65 yr old female  PMHX of DM type 2 , HTN  , HLD  and CKD   (now on Hd  form this admission ) p/w sob  found to have COVID 19  now extubated being transferred to floor     For Follow up :     :  Pt encephalopathic  likely  from Increased BUN planned for HD today     : Neuro check q 4     : trend LFT    : f/u Ammonia level     : consider  US abdomen to r/o Angelica cystitis     : Monitor for fevers     ; speech and swallow eval     : Pt  ambulatory prior to admission , if MS not improved  consider MR brain , Neuro eval  and Physical therapy to further assess Pt's  functional status          : 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 , plans for Renal Biopsy Friday  with Radiology DR Pereira , ASA on Hold from  Today , please follow up with Renal and Radiology  regarding  Kidney Biopsy           Vital Signs Last 24 Hrs  T(C): 37.6 (22 Apr 2020 11:00), Max: 37.9 (22 Apr 2020 00:00)  T(F): 99.6 (22 Apr 2020 11:00), Max: 100.3 (22 Apr 2020 00:00)  HR: 101 (22 Apr 2020 11:00) (96 - 107)  BP: 153/56 (22 Apr 2020 11:00) (100/30 - 181/69)  BP(mean): 85 (22 Apr 2020 11:00) (49 - 101)  RR: 22 (22 Apr 2020 11:00) (17 - 24)  SpO2: 95% (22 Apr 2020 11:00) (95% - 100%)  I&O's Summary    21 Apr 2020 07:01  -  22 Apr 2020 07:00  --------------------------------------------------------  IN: 1008 mL / OUT: 2115 mL / NET: -1107 mL    22 Apr 2020 07:01  -  22 Apr 2020 12:26  --------------------------------------------------------  IN: 215 mL / OUT: 400 mL / NET: -185 mL        MEDICATIONS  (STANDING):  artificial  tears Solution 1 Drop(s) Both EYES two times a day  aspirin  chewable 81 milliGRAM(s) Oral daily  dextrose 5%. 1000 milliLiter(s) (50 mL/Hr) IV Continuous <Continuous>  dextrose 50% Injectable 12.5 Gram(s) IV Push once  dextrose 50% Injectable 25 Gram(s) IV Push once  dextrose 50% Injectable 25 Gram(s) IV Push once  famotidine Injectable 20 milliGRAM(s) IV Push daily  heparin  Infusion. 700 Unit(s)/Hr (7 mL/Hr) IV Continuous <Continuous>  insulin glargine Injectable (LANTUS) 8 Unit(s) SubCutaneous every morning  insulin lispro (HumaLOG) corrective regimen sliding scale   SubCutaneous three times a day before meals  lactulose Syrup 20 Gram(s) Oral four times a day  levothyroxine Injectable 100 MICROGram(s) IV Push at bedtime    MEDICATIONS  (PRN):  acetaminophen  Suppository .. 650 milliGRAM(s) Rectal every 6 hours PRN Temp greater or equal to 38C (100.4F), Mild Pain (1 - 3)  bisacodyl Suppository 10 milliGRAM(s) Rectal daily PRN Constipation  dextrose 40% Gel 15 Gram(s) Oral once PRN Blood Glucose LESS THAN 70 milliGRAM(s)/deciliter  glucagon  Injectable 1 milliGRAM(s) IntraMuscular once PRN Glucose LESS THAN 70 milligrams/deciliter  heparin   Injectable 8000 Unit(s) IV Push every 6 hours PRN For aPTT less than 40  heparin   Injectable 4000 Unit(s) IV Push every 6 hours PRN For aPTT between 40 - 57  hydrALAZINE Injectable 10 milliGRAM(s) IV Push every 6 hours PRN SBP>150 or DBP >110        LABS                                            7.7                   Neurophils% (auto):   x      (04-22 @ 01:25):    10.92)-----------(223          Lymphocytes% (auto):  x                                             25.9                   Eosinphils% (auto):   x        Manual%: Neutrophils x    ; Lymphocytes x    ; Eosinophils x    ; Bands%: x    ; Blasts x                                    150    |  107    |  108                 Calcium: 9.2   / iCa: x      (04-22 @ 01:25)    ----------------------------<  210       Magnesium: 2.6                              3.4     |  26     |  3.93             Phosphorous: 6.1      TPro  6.1    /  Alb  2.2    /  TBili  0.3    /  DBili  0.2    /  AST  159    /  ALT  123    /  AlkPhos  1259   22 Apr 2020 01:25    ( 04-22 @ 01:25 )   PT: x    ;   INR: x      aPTT: 82.6 sec

## 2020-04-22 NOTE — PROGRESS NOTE ADULT - ATTENDING COMMENTS
I have seen this patient with the fellow and agree with their assessment and plan. In addition, now extubated  Plan for HD today given MS and keep her extubated.  No plans for renal bx yet  Please send serological workup we had ordered    Dylan Toscano MD  Cell   Pager   Office  I have seen this patient with the fellow and agree with their assessment and plan. In addition, now extubated  Plan for HD today given MS and keep her extubated.  All serologies review- dsDNA neg, PLAR2 neg, but SIFE is igG lambda+ and FLC ratio is normal but given protienuria and MARIO- would do a kidney bx now at this point- rule out MGRS and or other proteinuric cause. Parvo PCR neg.  Rpt p/crt ratio      Dylan Toscano MD  Cell   Pager   Office  I have seen this patient with the fellow and agree with their assessment and plan. In addition, now extubated  Plan for HD today given MS and keep her extubated.  All serologies review- dsDNA neg, PLAR2 neg, but SIFE is igG lambda+ and FLC ratio is normal but given protienuria and MARIO- would do a kidney bx now at this point- rule out MGRS and or other proteinuric cause. Parvo PCR neg.  Rpt p/crt ratio  Hold ASA and can plan for renal bx by Friday given had some ASA for few days.- please ask Dr Castelan from Radiology for US for the kidney bx    Dylan Toscano MD  Cell   Pager   Office

## 2020-04-22 NOTE — PROGRESS NOTE ADULT - ASSESSMENT
65F PMHx DM2, HTN, HLD who present to ED for fever/dyspnea x 1 week.  Per HPI, pt desaturated at home O2 sat 70% which improved nasal cannula (another family member).  In ED hypoxia 70s on nasal cannula s/p intubation.  Covid positive with CXR b/l opacities. Given ceftriaxone, azithromycin, hydroxychlorquine.     Nephrology consulted for MARIO or MARIO on CKD.     MARIO/ Proteinuria    in setting septic shock Covid19 infection, low BP, also noted to have nephrotic range proteinuria  Last HD on 4/13, non-oliguric however poor mental status and elevated BUN. Plan for HD today.   Will continue to reassess the need for RRT on daily basis  She has history of proteinuria on past labs and rept labs show only 3gm of proteinuria compared to 9gm on admission    Metabolic acidosis   improved now    Hyperphosphatemia  Plan for HD today  Start Phoslo TID

## 2020-04-23 VITALS
HEART RATE: 97 BPM | RESPIRATION RATE: 22 BRPM | TEMPERATURE: 98 F | OXYGEN SATURATION: 92 % | SYSTOLIC BLOOD PRESSURE: 114 MMHG | DIASTOLIC BLOOD PRESSURE: 65 MMHG

## 2020-04-23 DIAGNOSIS — Z29.9 ENCOUNTER FOR PROPHYLACTIC MEASURES, UNSPECIFIED: ICD-10-CM

## 2020-04-23 DIAGNOSIS — G93.40 ENCEPHALOPATHY, UNSPECIFIED: ICD-10-CM

## 2020-04-23 DIAGNOSIS — E11.9 TYPE 2 DIABETES MELLITUS WITHOUT COMPLICATIONS: ICD-10-CM

## 2020-04-23 DIAGNOSIS — D72.828 OTHER ELEVATED WHITE BLOOD CELL COUNT: ICD-10-CM

## 2020-04-23 DIAGNOSIS — R74.0 NONSPECIFIC ELEVATION OF LEVELS OF TRANSAMINASE AND LACTIC ACID DEHYDROGENASE [LDH]: ICD-10-CM

## 2020-04-23 DIAGNOSIS — D64.9 ANEMIA, UNSPECIFIED: ICD-10-CM

## 2020-04-23 LAB
ALBUMIN SERPL ELPH-MCNC: 2.2 G/DL — LOW (ref 3.3–5)
ALP SERPL-CCNC: 1143 U/L — HIGH (ref 40–120)
ALT FLD-CCNC: 100 U/L — HIGH (ref 10–45)
ANION GAP SERPL CALC-SCNC: 19 MMOL/L — HIGH (ref 5–17)
APPEARANCE UR: ABNORMAL
APTT BLD: 123.9 SEC — CRITICAL HIGH (ref 27.5–36.3)
APTT BLD: 82.2 SEC — HIGH (ref 27.5–36.3)
AST SERPL-CCNC: 119 U/L — HIGH (ref 10–40)
BACTERIA # UR AUTO: NEGATIVE — SIGNIFICANT CHANGE UP
BASOPHILS # BLD AUTO: 0.03 K/UL — SIGNIFICANT CHANGE UP (ref 0–0.2)
BASOPHILS NFR BLD AUTO: 0.2 % — SIGNIFICANT CHANGE UP (ref 0–2)
BILIRUB SERPL-MCNC: 0.4 MG/DL — SIGNIFICANT CHANGE UP (ref 0.2–1.2)
BILIRUB UR-MCNC: NEGATIVE — SIGNIFICANT CHANGE UP
BLD GP AB SCN SERPL QL: NEGATIVE — SIGNIFICANT CHANGE UP
BUN SERPL-MCNC: 103 MG/DL — HIGH (ref 7–23)
CALCIUM SERPL-MCNC: 9.1 MG/DL — SIGNIFICANT CHANGE UP (ref 8.4–10.5)
CHLORIDE SERPL-SCNC: 108 MMOL/L — SIGNIFICANT CHANGE UP (ref 96–108)
CO2 SERPL-SCNC: 26 MMOL/L — SIGNIFICANT CHANGE UP (ref 22–31)
COLOR SPEC: YELLOW — SIGNIFICANT CHANGE UP
COMMENT - URINE: SIGNIFICANT CHANGE UP
CREAT SERPL-MCNC: 3.63 MG/DL — HIGH (ref 0.5–1.3)
DIFF PNL FLD: ABNORMAL
EOSINOPHIL # BLD AUTO: 0 K/UL — SIGNIFICANT CHANGE UP (ref 0–0.5)
EOSINOPHIL NFR BLD AUTO: 0 % — SIGNIFICANT CHANGE UP (ref 0–6)
EPI CELLS # UR: 11 /HPF — HIGH
GGT SERPL-CCNC: 894 U/L — HIGH (ref 8–40)
GLUCOSE BLDC GLUCOMTR-MCNC: 211 MG/DL — HIGH (ref 70–99)
GLUCOSE BLDC GLUCOMTR-MCNC: 255 MG/DL — HIGH (ref 70–99)
GLUCOSE BLDC GLUCOMTR-MCNC: 262 MG/DL — HIGH (ref 70–99)
GLUCOSE BLDC GLUCOMTR-MCNC: 268 MG/DL — HIGH (ref 70–99)
GLUCOSE BLDC GLUCOMTR-MCNC: 311 MG/DL — HIGH (ref 70–99)
GLUCOSE SERPL-MCNC: 242 MG/DL — HIGH (ref 70–99)
GLUCOSE UR QL: ABNORMAL
GRAN CASTS # UR COMP ASSIST: 2 /LPF — SIGNIFICANT CHANGE UP
HCT VFR BLD CALC: 25.1 % — LOW (ref 34.5–45)
HCT VFR BLD CALC: 27.3 % — LOW (ref 34.5–45)
HGB BLD-MCNC: 7.3 G/DL — LOW (ref 11.5–15.5)
HGB BLD-MCNC: 8 G/DL — LOW (ref 11.5–15.5)
HYALINE CASTS # UR AUTO: 4 /LPF — SIGNIFICANT CHANGE UP (ref 0–7)
IMM GRANULOCYTES NFR BLD AUTO: 0.6 % — SIGNIFICANT CHANGE UP (ref 0–1.5)
INR BLD: 1.24 RATIO — HIGH (ref 0.88–1.16)
KETONES UR-MCNC: ABNORMAL
LEUKOCYTE ESTERASE UR-ACNC: NEGATIVE — SIGNIFICANT CHANGE UP
LYMPHOCYTES # BLD AUTO: 1.1 K/UL — SIGNIFICANT CHANGE UP (ref 1–3.3)
LYMPHOCYTES # BLD AUTO: 6.2 % — LOW (ref 13–44)
MCHC RBC-ENTMCNC: 26.4 PG — LOW (ref 27–34)
MCHC RBC-ENTMCNC: 27.1 PG — SIGNIFICANT CHANGE UP (ref 27–34)
MCHC RBC-ENTMCNC: 29.1 GM/DL — LOW (ref 32–36)
MCHC RBC-ENTMCNC: 29.3 GM/DL — LOW (ref 32–36)
MCV RBC AUTO: 90.6 FL — SIGNIFICANT CHANGE UP (ref 80–100)
MCV RBC AUTO: 92.5 FL — SIGNIFICANT CHANGE UP (ref 80–100)
MONOCYTES # BLD AUTO: 1.34 K/UL — HIGH (ref 0–0.9)
MONOCYTES NFR BLD AUTO: 7.5 % — SIGNIFICANT CHANGE UP (ref 2–14)
NEUTROPHILS # BLD AUTO: 15.24 K/UL — HIGH (ref 1.8–7.4)
NEUTROPHILS NFR BLD AUTO: 85.5 % — HIGH (ref 43–77)
NITRITE UR-MCNC: NEGATIVE — SIGNIFICANT CHANGE UP
NRBC # BLD: 0 /100 WBCS — SIGNIFICANT CHANGE UP (ref 0–0)
NRBC # BLD: 0 /100 WBCS — SIGNIFICANT CHANGE UP (ref 0–0)
PH UR: 6.5 — SIGNIFICANT CHANGE UP (ref 5–8)
PLATELET # BLD AUTO: 242 K/UL — SIGNIFICANT CHANGE UP (ref 150–400)
PLATELET # BLD AUTO: 268 K/UL — SIGNIFICANT CHANGE UP (ref 150–400)
POTASSIUM SERPL-MCNC: 3.4 MMOL/L — LOW (ref 3.5–5.3)
POTASSIUM SERPL-SCNC: 3.4 MMOL/L — LOW (ref 3.5–5.3)
PROCALCITONIN SERPL-MCNC: 0.41 NG/ML — HIGH (ref 0.02–0.1)
PROT SERPL-MCNC: 6.3 G/DL — SIGNIFICANT CHANGE UP (ref 6–8.3)
PROT UR-MCNC: ABNORMAL
PROTHROM AB SERPL-ACNC: 14.1 SEC — HIGH (ref 10–13.1)
RBC # BLD: 2.77 M/UL — LOW (ref 3.8–5.2)
RBC # BLD: 2.95 M/UL — LOW (ref 3.8–5.2)
RBC # FLD: 16 % — HIGH (ref 10.3–14.5)
RBC # FLD: 16.5 % — HIGH (ref 10.3–14.5)
RBC CASTS # UR COMP ASSIST: 16 /HPF — HIGH (ref 0–4)
RH IG SCN BLD-IMP: POSITIVE — SIGNIFICANT CHANGE UP
SODIUM SERPL-SCNC: 153 MMOL/L — HIGH (ref 135–145)
SP GR SPEC: 1.02 — SIGNIFICANT CHANGE UP (ref 1.01–1.02)
UROBILINOGEN FLD QL: NEGATIVE — SIGNIFICANT CHANGE UP
WBC # BLD: 17.81 K/UL — HIGH (ref 3.8–10.5)
WBC # BLD: 34.98 K/UL — HIGH (ref 3.8–10.5)
WBC # FLD AUTO: 17.81 K/UL — HIGH (ref 3.8–10.5)
WBC # FLD AUTO: 34.98 K/UL — HIGH (ref 3.8–10.5)
WBC UR QL: 27 /HPF — HIGH (ref 0–5)

## 2020-04-23 PROCEDURE — 99291 CRITICAL CARE FIRST HOUR: CPT | Mod: 25

## 2020-04-23 PROCEDURE — 80053 COMPREHEN METABOLIC PANEL: CPT

## 2020-04-23 PROCEDURE — 85730 THROMBOPLASTIN TIME PARTIAL: CPT

## 2020-04-23 PROCEDURE — 86255 FLUORESCENT ANTIBODY SCREEN: CPT

## 2020-04-23 PROCEDURE — 96375 TX/PRO/DX INJ NEW DRUG ADDON: CPT | Mod: XU

## 2020-04-23 PROCEDURE — 87040 BLOOD CULTURE FOR BACTERIA: CPT

## 2020-04-23 PROCEDURE — 94002 VENT MGMT INPAT INIT DAY: CPT

## 2020-04-23 PROCEDURE — 71045 X-RAY EXAM CHEST 1 VIEW: CPT

## 2020-04-23 PROCEDURE — 86706 HEP B SURFACE ANTIBODY: CPT

## 2020-04-23 PROCEDURE — 82248 BILIRUBIN DIRECT: CPT

## 2020-04-23 PROCEDURE — 85014 HEMATOCRIT: CPT

## 2020-04-23 PROCEDURE — 84156 ASSAY OF PROTEIN URINE: CPT

## 2020-04-23 PROCEDURE — 84436 ASSAY OF TOTAL THYROXINE: CPT

## 2020-04-23 PROCEDURE — 86334 IMMUNOFIX E-PHORESIS SERUM: CPT

## 2020-04-23 PROCEDURE — 83735 ASSAY OF MAGNESIUM: CPT

## 2020-04-23 PROCEDURE — 86160 COMPLEMENT ANTIGEN: CPT

## 2020-04-23 PROCEDURE — 85384 FIBRINOGEN ACTIVITY: CPT

## 2020-04-23 PROCEDURE — 82140 ASSAY OF AMMONIA: CPT

## 2020-04-23 PROCEDURE — 82947 ASSAY GLUCOSE BLOOD QUANT: CPT

## 2020-04-23 PROCEDURE — 82247 BILIRUBIN TOTAL: CPT

## 2020-04-23 PROCEDURE — 86850 RBC ANTIBODY SCREEN: CPT

## 2020-04-23 PROCEDURE — 82550 ASSAY OF CK (CPK): CPT

## 2020-04-23 PROCEDURE — 32551 INSERTION OF CHEST TUBE: CPT

## 2020-04-23 PROCEDURE — 76604 US EXAM CHEST: CPT | Mod: 26

## 2020-04-23 PROCEDURE — 85652 RBC SED RATE AUTOMATED: CPT

## 2020-04-23 PROCEDURE — 99261: CPT

## 2020-04-23 PROCEDURE — 81001 URINALYSIS AUTO W/SCOPE: CPT

## 2020-04-23 PROCEDURE — 87799 DETECT AGENT NOS DNA QUANT: CPT

## 2020-04-23 PROCEDURE — 86360 T CELL ABSOLUTE COUNT/RATIO: CPT

## 2020-04-23 PROCEDURE — 87340 HEPATITIS B SURFACE AG IA: CPT

## 2020-04-23 PROCEDURE — 87086 URINE CULTURE/COLONY COUNT: CPT

## 2020-04-23 PROCEDURE — 86038 ANTINUCLEAR ANTIBODIES: CPT

## 2020-04-23 PROCEDURE — 31500 INSERT EMERGENCY AIRWAY: CPT

## 2020-04-23 PROCEDURE — 83880 ASSAY OF NATRIURETIC PEPTIDE: CPT

## 2020-04-23 PROCEDURE — 82803 BLOOD GASES ANY COMBINATION: CPT

## 2020-04-23 PROCEDURE — 84484 ASSAY OF TROPONIN QUANT: CPT

## 2020-04-23 PROCEDURE — 82728 ASSAY OF FERRITIN: CPT

## 2020-04-23 PROCEDURE — 84443 ASSAY THYROID STIM HORMONE: CPT

## 2020-04-23 PROCEDURE — 70450 CT HEAD/BRAIN W/O DYE: CPT

## 2020-04-23 PROCEDURE — 84100 ASSAY OF PHOSPHORUS: CPT

## 2020-04-23 PROCEDURE — 96374 THER/PROPH/DIAG INJ IV PUSH: CPT | Mod: XU

## 2020-04-23 PROCEDURE — 82330 ASSAY OF CALCIUM: CPT

## 2020-04-23 PROCEDURE — 86900 BLOOD TYPING SEROLOGIC ABO: CPT

## 2020-04-23 PROCEDURE — 94003 VENT MGMT INPAT SUBQ DAY: CPT

## 2020-04-23 PROCEDURE — 99233 SBSQ HOSP IP/OBS HIGH 50: CPT | Mod: GC

## 2020-04-23 PROCEDURE — 82435 ASSAY OF BLOOD CHLORIDE: CPT

## 2020-04-23 PROCEDURE — 86704 HEP B CORE ANTIBODY TOTAL: CPT

## 2020-04-23 PROCEDURE — P9016: CPT

## 2020-04-23 PROCEDURE — 85027 COMPLETE CBC AUTOMATED: CPT

## 2020-04-23 PROCEDURE — 93005 ELECTROCARDIOGRAM TRACING: CPT

## 2020-04-23 PROCEDURE — 84132 ASSAY OF SERUM POTASSIUM: CPT

## 2020-04-23 PROCEDURE — 84145 PROCALCITONIN (PCT): CPT

## 2020-04-23 PROCEDURE — 82565 ASSAY OF CREATININE: CPT

## 2020-04-23 PROCEDURE — 86225 DNA ANTIBODY NATIVE: CPT

## 2020-04-23 PROCEDURE — 36430 TRANSFUSION BLD/BLD COMPNT: CPT

## 2020-04-23 PROCEDURE — 83615 LACTATE (LD) (LDH) ENZYME: CPT

## 2020-04-23 PROCEDURE — 84550 ASSAY OF BLOOD/URIC ACID: CPT

## 2020-04-23 PROCEDURE — 71045 X-RAY EXAM CHEST 1 VIEW: CPT | Mod: 26

## 2020-04-23 PROCEDURE — 86923 COMPATIBILITY TEST ELECTRIC: CPT

## 2020-04-23 PROCEDURE — 86036 ANCA SCREEN EACH ANTIBODY: CPT

## 2020-04-23 PROCEDURE — 84478 ASSAY OF TRIGLYCERIDES: CPT

## 2020-04-23 PROCEDURE — 76705 ECHO EXAM OF ABDOMEN: CPT

## 2020-04-23 PROCEDURE — 92950 HEART/LUNG RESUSCITATION CPR: CPT

## 2020-04-23 PROCEDURE — 86901 BLOOD TYPING SEROLOGIC RH(D): CPT

## 2020-04-23 PROCEDURE — 80048 BASIC METABOLIC PNL TOTAL CA: CPT

## 2020-04-23 PROCEDURE — 83521 IG LIGHT CHAINS FREE EACH: CPT

## 2020-04-23 PROCEDURE — 86803 HEPATITIS C AB TEST: CPT

## 2020-04-23 PROCEDURE — 86140 C-REACTIVE PROTEIN: CPT

## 2020-04-23 PROCEDURE — 80076 HEPATIC FUNCTION PANEL: CPT

## 2020-04-23 PROCEDURE — 83520 IMMUNOASSAY QUANT NOS NONAB: CPT

## 2020-04-23 PROCEDURE — 82570 ASSAY OF URINE CREATININE: CPT

## 2020-04-23 PROCEDURE — 85379 FIBRIN DEGRADATION QUANT: CPT

## 2020-04-23 PROCEDURE — 83605 ASSAY OF LACTIC ACID: CPT

## 2020-04-23 PROCEDURE — 76705 ECHO EXAM OF ABDOMEN: CPT | Mod: 26,RT

## 2020-04-23 PROCEDURE — 82977 ASSAY OF GGT: CPT

## 2020-04-23 PROCEDURE — 85610 PROTHROMBIN TIME: CPT

## 2020-04-23 PROCEDURE — 84480 ASSAY TRIIODOTHYRONINE (T3): CPT

## 2020-04-23 PROCEDURE — 86705 HEP B CORE ANTIBODY IGM: CPT

## 2020-04-23 PROCEDURE — 87635 SARS-COV-2 COVID-19 AMP PRB: CPT

## 2020-04-23 PROCEDURE — 84295 ASSAY OF SERUM SODIUM: CPT

## 2020-04-23 PROCEDURE — 83516 IMMUNOASSAY NONANTIBODY: CPT

## 2020-04-23 PROCEDURE — 82962 GLUCOSE BLOOD TEST: CPT

## 2020-04-23 PROCEDURE — 82955 ASSAY OF G6PD ENZYME: CPT

## 2020-04-23 RX ORDER — MEROPENEM 1 G/30ML
500 INJECTION INTRAVENOUS EVERY 24 HOURS
Refills: 0 | Status: DISCONTINUED | OUTPATIENT
Start: 2020-04-23 | End: 2020-04-23

## 2020-04-23 RX ORDER — ACETAMINOPHEN 500 MG
1000 TABLET ORAL ONCE
Refills: 0 | Status: COMPLETED | OUTPATIENT
Start: 2020-04-23 | End: 2020-04-23

## 2020-04-23 RX ORDER — INSULIN LISPRO 100/ML
VIAL (ML) SUBCUTANEOUS EVERY 6 HOURS
Refills: 0 | Status: DISCONTINUED | OUTPATIENT
Start: 2020-04-23 | End: 2020-04-23

## 2020-04-23 RX ORDER — ACETAMINOPHEN 500 MG
650 TABLET ORAL EVERY 6 HOURS
Refills: 0 | Status: DISCONTINUED | OUTPATIENT
Start: 2020-04-23 | End: 2020-04-23

## 2020-04-23 RX ORDER — VANCOMYCIN HCL 1 G
500 VIAL (EA) INTRAVENOUS ONCE
Refills: 0 | Status: COMPLETED | OUTPATIENT
Start: 2020-04-23 | End: 2020-04-23

## 2020-04-23 RX ADMIN — Medication 3: at 18:00

## 2020-04-23 RX ADMIN — Medication 1 DROP(S): at 05:42

## 2020-04-23 RX ADMIN — Medication 4: at 07:45

## 2020-04-23 RX ADMIN — LACTULOSE 20 GRAM(S): 10 SOLUTION ORAL at 18:02

## 2020-04-23 RX ADMIN — Medication 25 MILLIGRAM(S): at 18:01

## 2020-04-23 RX ADMIN — Medication 100 MILLIGRAM(S): at 16:08

## 2020-04-23 RX ADMIN — FAMOTIDINE 20 MILLIGRAM(S): 10 INJECTION INTRAVENOUS at 14:07

## 2020-04-23 RX ADMIN — LACTULOSE 20 GRAM(S): 10 SOLUTION ORAL at 14:08

## 2020-04-23 RX ADMIN — Medication 25 MILLIGRAM(S): at 05:43

## 2020-04-23 RX ADMIN — Medication 2: at 14:02

## 2020-04-23 RX ADMIN — HEPARIN SODIUM 700 UNIT(S)/HR: 5000 INJECTION INTRAVENOUS; SUBCUTANEOUS at 10:30

## 2020-04-23 RX ADMIN — MEROPENEM 100 MILLIGRAM(S): 1 INJECTION INTRAVENOUS at 15:02

## 2020-04-23 RX ADMIN — Medication 400 MILLIGRAM(S): at 09:02

## 2020-04-23 RX ADMIN — LACTULOSE 20 GRAM(S): 10 SOLUTION ORAL at 05:42

## 2020-04-23 RX ADMIN — Medication 1 DROP(S): at 18:02

## 2020-04-23 NOTE — PROGRESS NOTE ADULT - PROBLEM SELECTOR PLAN 3
Unknown baseline, now requiring dialysis  -dialysis to be conducted today (4/23)  -on phoslo TID  -nephrology following, appreciate recs  -at first presumed to be ATN from hypotension/sepsis, but now unclear given elevated IgG lambda  -concern now for MM  -plan for bedside renal biopsy on 4/24  -holding ASA, will hold heparin gtt 4 hours prior to biopsy

## 2020-04-23 NOTE — AIRWAY PLACEMENT NOTE ADULT - POST AIRWAY PLACEMENT ASSESSMENT:
CXR pending/breath sounds bilateral/chest excursion noted/breath sounds equal/positive end tidal CO2 noted

## 2020-04-23 NOTE — PROGRESS NOTE ADULT - SUBJECTIVE AND OBJECTIVE BOX
HISTORY  65y Female with COVID-19(+) pneumonia requiring intubation.    24 HOUR EVENTS:    SUBJECTIVE/ROS: Due to intubation with sedation, subjective information was not able to be obtained from the patient. History was obtained, to the extent possible, from review of the chart and collateral sources of information.      NEURO  Exam: Lethargic , arousable to repeated stimuli  Meds: acetaminophen  Suppository .. 650 milliGRAM(s) Rectal every 6 hours PRN Temp greater or equal to 38C (100.4F), Mild Pain (1 - 3)        RESPIRATORY  RR: 17 (17 - 24)  SpO2: 95% (94% - 100%)  Exam: Rhonchi       CARDIOVASCULAR  HR: 105 (80 - 105)  ABP: --  ABP(mean): --  Cardiac Rhythm: sinus  Meds: hydrALAZINE Injectable PRN        GI/NUTRITION  Diet:   Most recent bowel movement: 04/22  Meds/stress ulcer prophylaxis: bisacodyl Suppository 10 milliGRAM(s) Rectal daily PRN Constipation  dextrose 5%. 1000 milliLiter(s) IV Continuous <Continuous>  famotidine Injectable 20 milliGRAM(s) IV Push daily  lactulose Syrup 20 Gram(s) Oral four times a day        GENITOURINARY  I&O's Detail    04-20 @ 07:01 - 04-21 @ 07:00  --------------------------------------------------------  IN:    heparin  Infusion.: 81 mL    heparin  Infusion.: 54 mL    heparin Infusion: 50 mL  Total IN: 185 mL    OUT:    Indwelling Catheter - Urethral: 1550 mL  Total OUT: 1550 mL    Total NET: -1365 mL      04-21 @ 07:01 - 04-22 @ 01:44  --------------------------------------------------------  IN:    Enteral Tube Flush: 60 mL    heparin  Infusion.: 119 mL    Nepro with Carb Steady: 360 mL  Total IN: 539 mL    OUT:    Indwelling Catheter - Urethral: 1060 mL  Total OUT: 1060 mL    Total NET: -521 mL          04-21    146<H>  |  103  |  104<H>  ----------------------------<  122<H>  3.6   |  25  |  4.24<H>    Ca    8.8      21 Apr 2020 03:33  Phos  6.9     04-21  Mg     2.5     04-21    TPro  5.3<L>  /  Alb  2.3<L>  /  TBili  0.4  /  DBili  x   /  AST  72<H>  /  ALT  108<H>  /  AlkPhos  854<H>  04-21    [x] Glass catheter, indication: urine output monitoring in critically ill patient.  Meds: dextrose 5%. 1000 milliLiter(s) IV Continuous <Continuous>    Creatine Kinase, Serum: 16 (04-20-20 @ 00:26)        HEMATOLOGIC  Meds/VTE prophylaxis: aspirin  chewable 81 milliGRAM(s) Oral daily  heparin   Injectable 8000 Unit(s) IV Push every 6 hours PRN  heparin   Injectable 4000 Unit(s) IV Push every 6 hours PRN  heparin  Infusion. 700 Unit(s)/Hr IV Continuous <Continuous>                          7.7    10.92 )-----------( 223      ( 22 Apr 2020 01:25 )             25.9     PTT - ( 21 Apr 2020 12:32 )  PTT:70.0 sec  99904-21-20 @ 09:20        INFECTIOUS DISEASES  T(C): 36.4, Max: 37 (04-21-20 @ 04:00)  WBC Count: 10.92 (04-22-20 @ 01:25)  WBC Count: 9.86 (04-21-20 @ 03:33)    Recent Cultures:  Specimen Source: .Blood Blood-Peripheral, 04-16 @ 14:11; Results   No Growth Final; Gram Stain: --; Organism: --  Specimen Source: .Urine Catheterized, 04-16 @ 14:07; Results   No growth; Gram Stain: --; Organism: --    Meds:   Procalcitonin, Serum: 0.32 ng/mL (04-21-20 @ 03:33)        ENDOCRINE  Fingersticks: 214, 181, 154, 124, 118  Meds: dextrose 40% Gel 15 Gram(s) Oral once PRN  dextrose 50% Injectable 12.5 Gram(s) IV Push once  dextrose 50% Injectable 25 Gram(s) IV Push once  dextrose 50% Injectable 25 Gram(s) IV Push once  glucagon  Injectable 1 milliGRAM(s) IntraMuscular once PRN  insulin glargine Injectable (LANTUS) 8 Unit(s) SubCutaneous every morning  insulin lispro (HumaLOG) corrective regimen sliding scale   SubCutaneous three times a day before meals  levothyroxine Injectable 100 MICROGram(s) IV Push at bedtime        ACCESS DEVICES:  [ ] Peripheral IV  [x] Central Venous Line	[ ] R	[ ] L	[ ] IJ	[ ] Fem	[ ] SC	Placed:   [x] Arterial Line		[ ] R	[ ] L	[ ] Fem	[ ] Rad	[ ] Ax	Placed:   [ ] [facility] HD Access             [ ] R [ ] L [ ] IJ  [ ] Fem     Placed:  [x] Urinary Catheter, Date Placed:   Necessity of urinary, arterial, and venous catheters discussed.      CODE STATUS:     IMAGING: PROGRESS NOTE:   Caleb Morgan MD, PGY-1  Internal Medicine  [phone] // [facility]: [phone]    Patient is a 65y old  Female with a history of HTN, DM, HLD who presents with a chief complaint of SOB AND HYPOXIA (23 Apr 2020 08:48) found to be COVID Positive     SUBJECTIVE / OVERNIGHT EVENTS:  Patient was transferred from MICU down to floors last PM. This AM patient was noted to be febrile to 101.7. Urine noted to be cloudy with uptrending leukocytosis. Fever work up initiated. Scheduled for dialysis session this AM. Also scheduled for bedside renal biopsy at 1:30PM tomorrow (4/24). Will hold heparin 6 hours prior and ASA already on hold.     ADDITIONAL REVIEW OF SYSTEMS:    MEDICATIONS  (STANDING):  artificial  tears Solution 1 Drop(s) Both EYES two times a day  dextrose 5%. 1000 milliLiter(s) (50 mL/Hr) IV Continuous <Continuous>  dextrose 50% Injectable 12.5 Gram(s) IV Push once  dextrose 50% Injectable 25 Gram(s) IV Push once  dextrose 50% Injectable 25 Gram(s) IV Push once  famotidine Injectable 20 milliGRAM(s) IV Push daily  heparin  Infusion. 700 Unit(s)/Hr (7 mL/Hr) IV Continuous <Continuous>  insulin glargine Injectable (LANTUS) 8 Unit(s) SubCutaneous at bedtime  insulin lispro (HumaLOG) corrective regimen sliding scale   SubCutaneous three times a day before meals  lactulose Syrup 20 Gram(s) Oral four times a day  levothyroxine Injectable 100 MICROGram(s) IV Push at bedtime  metoprolol tartrate 25 milliGRAM(s) Oral every 12 hours    MEDICATIONS  (PRN):  acetaminophen    Suspension .. 650 milliGRAM(s) Oral every 6 hours PRN Temp greater or equal to 38C (100.4F), Mild Pain (1 - 3), Moderate Pain (4 - 6)  bisacodyl Suppository 10 milliGRAM(s) Rectal daily PRN Constipation  dextrose 40% Gel 15 Gram(s) Oral once PRN Blood Glucose LESS THAN 70 milliGRAM(s)/deciliter  glucagon  Injectable 1 milliGRAM(s) IntraMuscular once PRN Glucose LESS THAN 70 milligrams/deciliter  heparin   Injectable 8000 Unit(s) IV Push every 6 hours PRN For aPTT less than 40  heparin   Injectable 4000 Unit(s) IV Push every 6 hours PRN For aPTT between 40 - 57  hydrALAZINE Injectable 10 milliGRAM(s) IV Push every 6 hours PRN SBP>150 or DBP >110      CAPILLARY BLOOD GLUCOSE  POCT Blood Glucose.: 311 mg/dL (23 Apr 2020 07:41)  POCT Blood Glucose.: 268 mg/dL (23 Apr 2020 06:02)  POCT Blood Glucose.: 170 mg/dL (22 Apr 2020 23:53)  POCT Blood Glucose.: 99 mg/dL (22 Apr 2020 17:46)  POCT Blood Glucose.: 208 mg/dL (22 Apr 2020 10:48)    I&O's Summary    22 Apr 2020 07:01  -  23 Apr 2020 07:00  --------------------------------------------------------  IN: 1121 mL / OUT: 1735 mL / NET: -614 mL        PHYSICAL EXAM:  Vital Signs Last 24 Hrs  T(C): 38.7 (23 Apr 2020 05:04), Max: 38.7 (23 Apr 2020 05:04)  T(F): 101.7 (23 Apr 2020 05:04), Max: 101.7 (23 Apr 2020 05:04)  HR: 113 (23 Apr 2020 05:04) (101 - 113)  BP: 163/76 (23 Apr 2020 05:04) (153/56 - 170/77)  BP(mean): 102 (22 Apr 2020 15:00) (85 - 102)  RR: 20 (23 Apr 2020 05:04) (20 - 23)  SpO2: 98% (22 Apr 2020 20:00) (95% - 100%)    CONSTITUTIONAL: NAD, non-verbal. With NGT and NC  RESPIRATORY: Normal respiratory effort; lungs are clear to auscultation bilaterally  CARDIOVASCULAR: Tachycardic, normal S1 and S2, no murmur/rub/gallop; 2+ pitting edema b/l LE. Warm to touch  ABDOMEN: Nontender to palpation, no rebound/guarding;   MUSCLOSKELETAL: no clubbing or cyanosis of digits; no joint swelling or tenderness to palpation  PSYCH: non-verbal. patient minimally grimacing to painful stimulii    LABS:                        7.3    17.81 )-----------( 242      ( 23 Apr 2020 05:20 )             25.1     04-23    153<H>  |  108  |  103<H>  ----------------------------<  242<H>  3.4<L>   |  26  |  3.63<H>    Ca    9.1      23 Apr 2020 05:20  Phos  6.1     04-22  Mg     2.6     04-22    TPro  6.3  /  Alb  2.2<L>  /  TBili  0.4  /  DBili  x   /  AST  119<H>  /  ALT  100<H>  /  AlkPhos  1143<H>  04-23    PT/INR - ( 23 Apr 2020 08:30 )   PT: 14.1 sec;   INR: 1.24 ratio         PTT - ( 23 Apr 2020 08:30 )  PTT:82.2 sec      RADIOLOGY & ADDITIONAL TESTS:  Results Reviewed:   Imaging Personally Reviewed:  Electrocardiogram Personally Reviewed:    COORDINATION OF CARE:  Care Discussed with Consultants/Other Providers [Y/N]:  Prior or Outpatient Records Reviewed [Y/N]: PROGRESS NOTE:   Caleb Morgan MD, PGY-1  Internal Medicine  [phone] // [facility]: [phone]    Patient is a 65y old  Female with a history of HTN, DM, HLD who presents with a chief complaint of SOB AND HYPOXIA (23 Apr 2020 08:48) found to be COVID Positive     SUBJECTIVE / OVERNIGHT EVENTS:  Patient was transferred from MICU down to floors last PM. This AM patient was noted to be febrile to 101.7. Urine noted to be cloudy with uptrending leukocytosis. Fever work up initiated. Scheduled for dialysis session this AM. Also scheduled for bedside renal biopsy at 1:30PM tomorrow (4/24). Will hold heparin 4 hours prior and ASA already on hold.     ADDITIONAL REVIEW OF SYSTEMS:    MEDICATIONS  (STANDING):  artificial  tears Solution 1 Drop(s) Both EYES two times a day  dextrose 5%. 1000 milliLiter(s) (50 mL/Hr) IV Continuous <Continuous>  dextrose 50% Injectable 12.5 Gram(s) IV Push once  dextrose 50% Injectable 25 Gram(s) IV Push once  dextrose 50% Injectable 25 Gram(s) IV Push once  famotidine Injectable 20 milliGRAM(s) IV Push daily  heparin  Infusion. 700 Unit(s)/Hr (7 mL/Hr) IV Continuous <Continuous>  insulin glargine Injectable (LANTUS) 8 Unit(s) SubCutaneous at bedtime  insulin lispro (HumaLOG) corrective regimen sliding scale   SubCutaneous three times a day before meals  lactulose Syrup 20 Gram(s) Oral four times a day  levothyroxine Injectable 100 MICROGram(s) IV Push at bedtime  metoprolol tartrate 25 milliGRAM(s) Oral every 12 hours    MEDICATIONS  (PRN):  acetaminophen    Suspension .. 650 milliGRAM(s) Oral every 6 hours PRN Temp greater or equal to 38C (100.4F), Mild Pain (1 - 3), Moderate Pain (4 - 6)  bisacodyl Suppository 10 milliGRAM(s) Rectal daily PRN Constipation  dextrose 40% Gel 15 Gram(s) Oral once PRN Blood Glucose LESS THAN 70 milliGRAM(s)/deciliter  glucagon  Injectable 1 milliGRAM(s) IntraMuscular once PRN Glucose LESS THAN 70 milligrams/deciliter  heparin   Injectable 8000 Unit(s) IV Push every 6 hours PRN For aPTT less than 40  heparin   Injectable 4000 Unit(s) IV Push every 6 hours PRN For aPTT between 40 - 57  hydrALAZINE Injectable 10 milliGRAM(s) IV Push every 6 hours PRN SBP>150 or DBP >110      CAPILLARY BLOOD GLUCOSE  POCT Blood Glucose.: 311 mg/dL (23 Apr 2020 07:41)  POCT Blood Glucose.: 268 mg/dL (23 Apr 2020 06:02)  POCT Blood Glucose.: 170 mg/dL (22 Apr 2020 23:53)  POCT Blood Glucose.: 99 mg/dL (22 Apr 2020 17:46)  POCT Blood Glucose.: 208 mg/dL (22 Apr 2020 10:48)    I&O's Summary    22 Apr 2020 07:01  -  23 Apr 2020 07:00  --------------------------------------------------------  IN: 1121 mL / OUT: 1735 mL / NET: -614 mL        PHYSICAL EXAM:  Vital Signs Last 24 Hrs  T(C): 38.7 (23 Apr 2020 05:04), Max: 38.7 (23 Apr 2020 05:04)  T(F): 101.7 (23 Apr 2020 05:04), Max: 101.7 (23 Apr 2020 05:04)  HR: 113 (23 Apr 2020 05:04) (101 - 113)  BP: 163/76 (23 Apr 2020 05:04) (153/56 - 170/77)  BP(mean): 102 (22 Apr 2020 15:00) (85 - 102)  RR: 20 (23 Apr 2020 05:04) (20 - 23)  SpO2: 98% (22 Apr 2020 20:00) (95% - 100%)    CONSTITUTIONAL: NAD, non-verbal. With NGT and NC  RESPIRATORY: Normal respiratory effort; lungs are clear to auscultation bilaterally  CARDIOVASCULAR: Tachycardic, normal S1 and S2, no murmur/rub/gallop; 2+ pitting edema b/l LE. Warm to touch  ABDOMEN: Nontender to palpation, no rebound/guarding;   MUSCLOSKELETAL: no clubbing or cyanosis of digits; no joint swelling or tenderness to palpation  PSYCH: non-verbal. patient minimally grimacing to painful stimulii    LABS:                        7.3    17.81 )-----------( 242      ( 23 Apr 2020 05:20 )             25.1     04-23    153<H>  |  108  |  103<H>  ----------------------------<  242<H>  3.4<L>   |  26  |  3.63<H>    Ca    9.1      23 Apr 2020 05:20  Phos  6.1     04-22  Mg     2.6     04-22    TPro  6.3  /  Alb  2.2<L>  /  TBili  0.4  /  DBili  x   /  AST  119<H>  /  ALT  100<H>  /  AlkPhos  1143<H>  04-23    PT/INR - ( 23 Apr 2020 08:30 )   PT: 14.1 sec;   INR: 1.24 ratio         PTT - ( 23 Apr 2020 08:30 )  PTT:82.2 sec      RADIOLOGY & ADDITIONAL TESTS:  Results Reviewed:   Imaging Personally Reviewed:  Electrocardiogram Personally Reviewed:    COORDINATION OF CARE:  Care Discussed with Consultants/Other Providers [Y/N]:  Prior or Outpatient Records Reviewed [Y/N]:

## 2020-04-23 NOTE — PROGRESS NOTE ADULT - PROBLEM SELECTOR PROBLEM 1
MARIO (acute kidney injury)
Infection due to [facility] coronavirus

## 2020-04-23 NOTE — SWALLOW BEDSIDE ASSESSMENT ADULT - SWALLOW EVAL: DIAGNOSIS
Attempted to see pt for bedside swallow evaluation. Per report from RN, pt currently off the floor for dialysis. Per discussion with RN and MD Morgan pt's mentation does not currently support safe PO intake. MD to reconsult this service when deemed medically appropriate.

## 2020-04-23 NOTE — CHART NOTE - NSCHARTNOTESELECT_GEN_ALL_CORE
Transfer/Event Note
Consent Nephrology/Event Note
Event Note
MAR Accept Note
Nutrition Services
central line removal/Event Note

## 2020-04-23 NOTE — CHART NOTE - NSCHARTNOTEFT_GEN_A_CORE
Nutrition Follow-up Note  Patient seen for: nutrition follow-up    Chart reviewed, events noted. This is a 66 yo female with HTN, DM, HLD, who presented with SOB and was found to be COVID+, required intubation and mechanical ventilation; extubated 4/19. Transferred to floor on 4/22. SLP evaluation today deferred due to current mentation. HD today     Source of Information: medical record, team, unable to conduct a face to face interview due to limited contact restrictions related to patient's medical condition and isolation precaution, pt lethargic.     Current Diet: NPO with enteral feeds     EN regimen: Nepro 1.8 bolus feeds of 180ml q 6hrs     Provides 720 ml formula, 1296 calories (13 marisol/Kg per dosing wt 100Kg), 58 grams protein (1.3Gm/Kg per IBW 45.5Kg)    Objective Information:  Skin: free of pressure injuries per nursing flow sheets, noted with IAD on right buttocks   Edema:  +2 generalized  Last BM: 4/23  No residuals noted.     Anthropometrics:    Height (cm): 152.4 (04-04-20 @ 04:42)  Weight (kg): 100 (04-03-20 @ 14:20)  BMI (kg/m2): 43.1 (04-04-20 @ 04:42)  IBW: 45.5 Kg    Pre-HD weight today: 110.5Kg     Labs:     04-23 @ 05:20: Na 153<H>, <H>, Cr 3.63<H>, <H>, K+ 3.4<L>, Phos --, Mg --, Alk Phos 1143<H>, ALT/SGPT 100<H>, AST/SGOT 119<H>, HbA1c --    POCT Blood Glucose.: 311 mg/dL (23 Apr 2020 07:41)  POCT Blood Glucose.: 268 mg/dL (23 Apr 2020 06:02)  POCT Blood Glucose.: 170 mg/dL (22 Apr 2020 23:53)  POCT Blood Glucose.: 99 mg/dL (22 Apr 2020 17:46)      Estimated Needs: with consideration for BMI>40, increased for extubation  Energy: 8934-0330 marisol/day (15-20 marisol/Kg per dosing wt 100Kg)  Protein: 73-82 Gm/day (1.6-1.8 Gm/Kg per IBW 45.5Kg)      Previous Nutrition Diagnosis Overweight/Obesity  Nutrition Diagnosis is: ongoing, being addressed EN regimen     New Nutrition Diagnosis: Inadequate protein-energy intake related to inadequate EN regimen as evidenced by current EN not meeting low-end estimated calorie or protein needs.     Nutrition Care Plan:  [x ] In progress   [ ] Achieved    Nutrition Interventions:  1) Recommend increase Bolus to Nepro @ 240ml q6 hrs; provides 960ml formula, 1728 calories (17 marisol/Kg per dosing wt 100Kg), 78 grams protein (1.7 Gm/Kg per IBW 45.5Kg).   2) Monitor tolerance to EN regimen and adjust as needed.      Monitoring and Evaluation:   Continue to monitor Nutritional intake, Tolerance to diet prescription, weights, labs, skin integrity    RD remains available upon request and will follow up per protocol  Mary Chang RD, CDN, Pager # [phone]

## 2020-04-23 NOTE — PROCEDURE NOTE - NSINDICATIONS_GEN_A_CORE
pneumothorax
tension/pneumothorax
COVID-19/critical illness
dialysis/CRRT
critical patient/airway protection/respiratory failure/respiratory distress

## 2020-04-23 NOTE — CONSULT NOTE ADULT - ATTENDING COMMENTS
COVID-19: [ X ] confirmed    /    [  ] suspected    /    [  ] ruled out    [ X ] acute respiratory failure with hypoxemia    /   [  ] hypercapnia    /    [  ] ARDS  ----[ X ] mechanical ventilation  ----[ X ] high PEEP  ----[ X ] continuous sedation to synchronize with mechanical ventilator  ----[  ] paralysis  ----[  ] prone positioning    [ X ] septic shock secondary to COVID-19 requiring:  ----[  ] invasive hemodynamic monitoring  ----[  ] vasopressors    [ X ] acute kidney injury secondary to septic shock requiring:  ----[  ] intensive fluid management in the setting of ARDS  ----[ X ] renal replacement therapy    Intubated and transferred to [facility] for recurrent acute respiratory failure secondary to aspiration event requiring CPR. Shortly after arrival in [facility], she developed profound hypoxemia and had a recurrent episode of PEA cardiac arrest. CPR/ACLS was performed. Given her recent CPR, decreased left-sided breath sounds and no lung sliding on bilateral lung U/S, bilateral finger thoracostomies were performed. There was an air leak from the left thoracostomy concerning for tension pneumothorax. We were unsuccessful in achieving ROSC, so she was pronounced dead and her healthcare proxy was informed.      The patient was admitted to the ICU for acute critical care management. This patient is in critical condition at risk for imminent life-threatening decompensation secondary to respiratory and/or other organ failure.

## 2020-04-23 NOTE — PROGRESS NOTE ADULT - PROBLEM SELECTOR PROBLEM 4
Hypocalcemia
Encephalopathy acute

## 2020-04-23 NOTE — PROGRESS NOTE ADULT - PROBLEM SELECTOR PLAN 1
With associated hypoxic respiratory failure requiring intubation on 4/3, extubated on 4/19  -s.p plaquenil course, anakinra (4/10 - 4/13), solumedrol courses (last 4/16)  -repeated COVID test on 4/13. remained positive With associated hypoxic respiratory failure requiring intubation on 4/3, extubated on 4/19  -s.p plaquenil course, anakinra (4/10 - 4/13), solumedrol courses (last 4/16)  -repeated COVID test on 4/13. remained positive  -on heparin gtt

## 2020-04-23 NOTE — PROGRESS NOTE ADULT - SUBJECTIVE AND OBJECTIVE BOX
[facility] DIVISION OF KIDNEY DISEASES AND HYPERTENSION -- FOLLOW UP NOTE  --------------------------------------------------------------------------------  Lm De La Cruz   Nephrology Fellow  Pager NS: [phone]/ [facility]: [phone]  (After 5 pm or on weekends please page the on-call fellow)    24 hour events/subjective: Patient seen and examined at the bedside. Extubated on 4/19, poor mental status. Scr improving, non-oliguric. BUN remains elevated, 103. Na high at 153        PAST HISTORY  --------------------------------------------------------------------------------  No significant changes to PMH, PSH, FHx, SHx, unless otherwise noted    ALLERGIES & MEDICATIONS  --------------------------------------------------------------------------------  Allergies    No Known Allergies    Intolerances      Standing Inpatient Medications  acetaminophen  IVPB .. 1000 milliGRAM(s) IV Intermittent once  artificial  tears Solution 1 Drop(s) Both EYES two times a day  dextrose 5%. 1000 milliLiter(s) IV Continuous <Continuous>  dextrose 50% Injectable 12.5 Gram(s) IV Push once  dextrose 50% Injectable 25 Gram(s) IV Push once  dextrose 50% Injectable 25 Gram(s) IV Push once  famotidine Injectable 20 milliGRAM(s) IV Push daily  heparin  Infusion. 700 Unit(s)/Hr IV Continuous <Continuous>  insulin glargine Injectable (LANTUS) 8 Unit(s) SubCutaneous at bedtime  insulin lispro (HumaLOG) corrective regimen sliding scale   SubCutaneous three times a day before meals  lactulose Syrup 20 Gram(s) Oral four times a day  levothyroxine Injectable 100 MICROGram(s) IV Push at bedtime  metoprolol tartrate 25 milliGRAM(s) Oral every 12 hours    PRN Inpatient Medications  acetaminophen    Suspension .. 650 milliGRAM(s) Oral every 6 hours PRN  bisacodyl Suppository 10 milliGRAM(s) Rectal daily PRN  dextrose 40% Gel 15 Gram(s) Oral once PRN  glucagon  Injectable 1 milliGRAM(s) IntraMuscular once PRN  heparin   Injectable 8000 Unit(s) IV Push every 6 hours PRN  heparin   Injectable 4000 Unit(s) IV Push every 6 hours PRN  hydrALAZINE Injectable 10 milliGRAM(s) IV Push every 6 hours PRN      REVIEW OF SYSTEMS  --------------------------------------------------------------------------------  unable to obtain    >>> <<<    VITALS/PHYSICAL EXAM  --------------------------------------------------------------------------------  T(C): 38.7 (04-23-20 @ 05:04), Max: 38.7 (04-23-20 @ 05:04)  HR: 113 (04-23-20 @ 05:04) (101 - 113)  BP: 163/76 (04-23-20 @ 05:04) (153/56 - 170/77)  RR: 20 (04-23-20 @ 05:04) (20 - 23)  SpO2: 98% (04-22-20 @ 20:00) (95% - 100%)  Wt(kg): --        04-22-20 @ 07:01  -  04-23-20 @ 07:00  --------------------------------------------------------  IN: 1121 mL / OUT: 1735 mL / NET: -614 mL      Physical Exam: Deferred due to COVID-19 positivity, and preservation of PPE.     LABS/STUDIES  --------------------------------------------------------------------------------              7.3    17.81 >-----------<  242      [04-23-20 @ 05:20]              25.1     153  |  108  |  103  ----------------------------<  242      [04-23-20 @ 05:20]  3.4   |  26  |  3.63        Ca     9.1     [04-23-20 @ 05:20]      Mg     2.6     [04-22-20 @ 01:25]      Phos  6.1     [04-22-20 @ 01:25]    TPro  6.3  /  Alb  2.2  /  TBili  0.4  /  DBili  x   /  AST  119  /  ALT  100  /  AlkPhos  1143  [04-23-20 @ 05:20]      PTT: 82.6       [04-22-20 @ 01:25]          [04-22-20 @ 01:25]    Creatinine Trend:  SCr 3.63 [04-23 @ 05:20]  SCr 3.93 [04-22 @ 01:25]  SCr 4.24 [04-21 @ 03:33]  SCr 4.27 [04-20 @ 14:19]  SCr 4.47 [04-20 @ 00:26]    Urinalysis - [04-16-20 @ 11:45]      Color Yellow / Appearance Clear / SG 1.017 / pH 6.5      Gluc 500 mg/dL / Ketone Negative  / Bili Negative / Urobili Negative       Blood Negative / Protein 300 mg/dL / Leuk Est Negative / Nitrite Negative      RBC 6 / WBC 25 / Hyaline 4 / Gran  / Sq Epi  / Non Sq Epi 1 / Bacteria Negative    Urine Creatinine 61      [04-22-20 @ 13:57]  Urine Protein 835      [04-22-20 @ 13:57]    Ferritin 1223      [04-22-20 @ 04:51]  TSH 4.06      [04-14-20 @ 16:12]  Lipid: chol --, , HDL --, LDL --      [04-22-20 @ 01:25]    HBsAb <3.0      [04-10-20 @ 00:19]  HBsAg Nonreact      [04-10-20 @ 00:19]  HBcAb Nonreact      [04-10-20 @ 00:19]  HCV 0.14, Nonreact      [04-10-20 @ 00:19]    : titer Negative, pattern --      [04-17-20 @ 21:58]  dsDNA <12      [04-17-20 @ 21:58]  C3 Complement 149      [04-18-20 @ 00:40]  C4 Complement 26      [04-18-20 @ 00:40]  ANCA: cANCA Negative, pANCA Negative, atypical ANCA Negative      [04-17-20 @ 21:58]  anti-GBM <1.0      [04-17-20 @ 22:45]  PLA2R: SATINDER <2, IFA Negative      [04-17-20 @ 23:26]  Free Light Chains: kappa 11.75, lambda 11.19, ratio = 1.05      [04-18 @ 00:40]  Immunofixation Serum:   IgG Lambda Band Identified    Reference Range: None Detected      [04-18-20 @ 00:40]

## 2020-04-23 NOTE — PROGRESS NOTE ADULT - PROBLEM SELECTOR PLAN 4
chart(s)
Was ambulatory at baseline  -now noted to have very poor mental status post-extubation  -unclear etiology  -to consider MR head to evaluate  -LFTs have been uptrending, but ammonia has been normal  -failed speech and swallow eval x2 and now with NGT

## 2020-04-23 NOTE — PROGRESS NOTE ADULT - ASSESSMENT
65F PMHx DM2, HTN, HLD who present to ED for fever/dyspnea x 1 week.  Per HPI, pt desaturated at home O2 sat 70% which improved nasal cannula (another family member).  In ED hypoxia 70s on nasal cannula s/p intubation.  Covid positive with CXR b/l opacities. Given ceftriaxone, azithromycin, hydroxychlorquine.     Nephrology consulted for MARIO or MARIO on CKD.     MARIO/ Proteinuria    in setting septic shock Covid19 infection, low BP, also noted to have nephrotic range proteinuria  Last HD on 4/13, non-oliguric however poor mental status and elevated BUN. Plan for HD today.   Will continue to reassess the need for RRT on daily basis  She has history of proteinuria on past labs and evidence of MGUS, would plan for kidney biopsy this Friday.     Metabolic acidosis   improved now    Hyperphosphatemia  Plan for HD today

## 2020-04-23 NOTE — PROGRESS NOTE ADULT - PROBLEM SELECTOR PLAN 6
Normocytic anemia  -Hb was 9.5 on admission, unclear baseline  -ferritin elevated, and minimally elevated RDW, likely a result of acute illness compared to blood loss  -has required transfusions this hospitalization  -close to requiring repeat transfusion  -goal Hb >7  -currently on heparin

## 2020-04-23 NOTE — SWALLOW BEDSIDE ASSESSMENT ADULT - SLP PERTINENT HISTORY OF CURRENT PROBLEM
64 yo female with HTN, DM, HLD,   presenting with SOB for one week. per patient's , patient had one week of SOB, fevers. Was saturating 70% at home, where patient used family member's oxygen, bringing up to 80-90%. 4/3 was 60% on RA so came to ED for further evaluation. Pt intubated in ED on 4/3. Started on HD 4/8. h/h dropped to 6.3/20.8, Heparin gtt d/c, 1U PRBC given on 4/17. Extubated 4/19. As per EMR on 4/22: Mental status- not following many commands; Slight Right sided weakness-CT (4/19) No S/H/M.

## 2020-04-23 NOTE — DISCHARGE NOTE FOR THE EXPIRED PATIENT - NS PATIENT DEATH CRITERIA
COVID-19+/Patient is dead based on Cardiopulmonary criteria including absent breath sounds, pulselessness and/or asystole

## 2020-04-23 NOTE — PROGRESS NOTE ADULT - ATTENDING COMMENTS
I have seen this patient with the fellow and agree with their assessment and plan. In addition,    # MARIO/ATN - secondary to hypotension. Patient was off dialysis since 4/13, but noted worsening BUN and altered mental status. For dialysis today.     # MGUS - IgG lambda elevated. 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 We will need to arrange for a kidney biopsy.     #Proteinuria- 13 g/g. We will plan for a kidney biopsy as above.     # Medication toxicity Monitoring: medication dose reviewed. Please dose medications to CrCl<10    The rest of the recommendations as per fellow's note.    Aviva Kelly MD  Attending Nephrologist  [phone]

## 2020-04-23 NOTE — PROGRESS NOTE ADULT - REASON FOR ADMISSION
SOB AND HYPOXIA

## 2020-04-23 NOTE — PROGRESS NOTE ADULT - ATTENDING COMMENTS
65 y.o woman with a history of HTN, DM2, HLD, hypothyroidism who presented to the hospital in AHRF 2/2 COVID, patient is s/p extubation, with initiation of HD for acute renal failure, serologies c/f MM, and being worked up for recent fever to 101.7.     Episode of desaturation during HD, now requiring NRB and NC  f/u CXR, UCx, BCx x2, sputum Cx  Start Vanc (x1) and lina per ID  Trend LFTs  Hep gtt  Lantus, ISS  Friday scheduled for kidney biospy   MRI abd and brain when stable    Obehioya Irumudomon, MD  [phone]

## 2020-04-23 NOTE — DISCHARGE NOTE FOR THE EXPIRED PATIENT - HOSPITAL COURSE
Patient is a 65 year old woman PMHx DM2, HTN, HLD who presented to ED for fever/dyspnea x 1 week c/b AHRF 2/2 COVID-19 s/p intubation now extubated 4/19 with course further complicated by ARF requiring HD since 4/8/20 with perma-cath and being worked up for MM.   Patient was transferred to medicine floor on 4/22, and today found to be febrile, was given 1 dose of vancomycin and started on meropenem.  Patient had HD session today, noted to be tachypneic, and hypoxic to the 80's, and was switched from 4L NC to NRB and patient became hypotensive.  RRT was called for this,upon arrival by RRT team pt cyanotic unresponsive with pulse. HR 30s. and hypotensive. O2 Sat 30s. Upon several minutes, pt lost pulse. compressions were initiated for PEA arrest. ROSC achieved after 2 rounds of CPR. Patient was intubated, and noted creamy liquid similar to tube feeds, ?aspiration. Patient was given total of 1mg epinephrine and 1 amp of sodium bicarbonate. Patient subsequently transferred to [facility].  On arrival to unit, patient on levophed and propofol, and high peep 15 and 100% fio2, patient noted to be hypoxic, bedside POCUS, no lung sliding b/l.  B/L chest tube placement, and patient immediately started CPR and 2 rounds of epinephrines were given. Patient remain in PEA arrest.  Code was called off, and patient was pronounced 4/23/2020 @ 19:38pm     Family, HCP Eliseo was called , and the rest of the family were notified.

## 2020-04-23 NOTE — PROCEDURE NOTE - ADDITIONAL PROCEDURE DETAILS
Bilateral tube thoracostomy performed during code blue. CPR performed, patient . No breath sounds on left. Bilateral tube thoracostomy performed during code blue. CPR performed, patient .

## 2020-04-23 NOTE — PROGRESS NOTE ADULT - PROBLEM SELECTOR PLAN 5
Elevated ALT/AST/alk phos. Normal bilirubin  -potentially mediated from critical state  -GGT to determine origin of elevated enzymes  -Abdominal US as above

## 2020-04-23 NOTE — PROGRESS NOTE ADULT - PROBLEM SELECTOR PROBLEM 2
Metabolic acidosis
Other elevated white blood cell (WBC) count

## 2020-04-23 NOTE — PROCEDURE NOTE - SUPERVISORY STATEMENT
Decreased breath sounds on left and no lung sliding on ultrasound, so left finger thoracostomy performed emergently during CPR.  Audible air leak found consistent with tension pneumothorax.
No right lung sliding on ultrasound, so right finger thoracostomy performed emergently during CPR.  No audible air leak heard.

## 2020-04-23 NOTE — PROCEDURE NOTE - NSPROCDETAILS_GEN_ALL_CORE
patient pre-oxygenated, tube inserted, placement confirmed
finger thoracostomy
ultrasound guidance/lumen(s) aspirated and flushed/sterile dressing applied/sterile technique, catheter placed/guidewire recovered
guidewire recovered/lumen(s) aspirated and flushed/sterile dressing applied/sterile technique, catheter placed/ultrasound guidance

## 2020-04-23 NOTE — RAPID RESPONSE TEAM SUMMARY - NSSITUATIONBACKGROUNDRRT_GEN_ALL_CORE
65 year old woman PMHx DM2, HTN, HLD who presented to ED for fever/dyspnea x 1 week c/b AHRF 2/2 COVID-19 s/o intubation now extubated with course further complicated by ARF requiring HD since 4/8/20. Upon arrival, pt cyanotic unresponsive with pulse. HR 30s. and hypoTN. O2 Sat 30s. Upon several minutes, pt lost pulse. compressions were initiated for PEA arrest. ROSC achieved after 2 rounds of CPR.

## 2020-04-23 NOTE — PROGRESS NOTE ADULT - PROBLEM SELECTOR PROBLEM 3
Hyperphosphatemia
MARIO (acute kidney injury)

## 2020-04-23 NOTE — CONSULT NOTE ADULT - SUBJECTIVE AND OBJECTIVE BOX
Patient is a 65y old  Female who presents with a chief complaint of SOB AND HYPOXIA (2020 19:49)    Patient is a 65 year old woman PMHx DM2, HTN, HLD who presented to ED for fever/dyspnea x 1 week c/b AHRF 2/ COVID-19 s/p intubation now extubated  with course further complicated by ARF requiring HD since 20 with perma-cath and being worked up for MM.   Patient was transferred to medicine floor on , and today found to be febrile, was given 1 dose of vancomycin and started on meropenem.  Patient had HD session today, noted to be tachypneic, and hypoxic to the 80's, and was switched from 4L NC to NRB and patient became hypotensive.  RRT was called for this,upon arrival by RRT team pt cyanotic unresponsive with pulse. HR 30s. and hypotensive. O2 Sat 30s. Upon several minutes, pt lost pulse. compressions were initiated for PEA arrest. ROSC achieved after 2 rounds of CPR. Patient was intubated, and noted creamy liquid similar to tube feeds, ?aspiration. Patient was given total of 1mg epinephrine and 1 amp of sodium bicarbonate. Patient subsequently transferred to [facility].  On arrival to unit, patient on levophed and propofol, and high peep 15 and 100% fio2, patient noted to be hypoxic, bedside POCUS, no lung sliding b/l.  B/L chest tube placement, and patient immediately started CPR and 2 rounds of epinephrines were given. Patient remain in PEA arrest.          PAST MEDICAL & SURGICAL HISTORY:  Type 2 diabetes mellitus  Hyperlipidemia  HTN (hypertension)          MEDICATIONS  (STANDING):  artificial  tears Solution 1 Drop(s) Both EYES two times a day  dextrose 5%. 1000 milliLiter(s) (50 mL/Hr) IV Continuous <Continuous>  dextrose 50% Injectable 12.5 Gram(s) IV Push once  dextrose 50% Injectable 25 Gram(s) IV Push once  dextrose 50% Injectable 25 Gram(s) IV Push once  famotidine Injectable 20 milliGRAM(s) IV Push daily  heparin  Infusion. 700 Unit(s)/Hr (7 mL/Hr) IV Continuous <Continuous>  insulin glargine Injectable (LANTUS) 8 Unit(s) SubCutaneous at bedtime  insulin lispro (HumaLOG) corrective regimen sliding scale   SubCutaneous every 6 hours  lactulose Syrup 20 Gram(s) Oral four times a day  levothyroxine Injectable 100 MICROGram(s) IV Push at bedtime  meropenem  IVPB 500 milliGRAM(s) IV Intermittent every 24 hours  metoprolol tartrate 25 milliGRAM(s) Oral every 12 hours    MEDICATIONS  (PRN):  acetaminophen    Suspension .. 650 milliGRAM(s) Oral every 6 hours PRN Temp greater or equal to 38C (100.4F), Mild Pain (1 - 3), Moderate Pain (4 - 6)  bisacodyl Suppository 10 milliGRAM(s) Rectal daily PRN Constipation  dextrose 40% Gel 15 Gram(s) Oral once PRN Blood Glucose LESS THAN 70 milliGRAM(s)/deciliter  glucagon  Injectable 1 milliGRAM(s) IntraMuscular once PRN Glucose LESS THAN 70 milligrams/deciliter  heparin   Injectable 8000 Unit(s) IV Push every 6 hours PRN For aPTT less than 40  heparin   Injectable 4000 Unit(s) IV Push every 6 hours PRN For aPTT between 40 - 57  hydrALAZINE Injectable 10 milliGRAM(s) IV Push every 6 hours PRN SBP>150 or DBP >110      PHYSICAL EXAM:  T(C): 36.9 (2020 16:00), Max: 38.7 (2020 05:04)  T(F): 98.4 (2020 16:00), Max: 101.7 (2020 05:04)  HR: 97 (2020 16:00) (97 - 115)  BP: 114/65 (2020 16:00) (114/65 - 190/88)  RR: 22 (2020 16:00) (20 - 28)  SpO2: 92% (2020 16:00) (90% - 97%)        LABS:                        8.0    34.98 )-----------( 268      ( 2020 17:45 )             27.3     04-23    153<H>  |  108  |  103<H>  ----------------------------<  242<H>  3.4<L>   |  26  |  3.63<H>    Ca    9.1      2020 05:20  Phos  6.1     04-22  Mg     2.6     04-22    TPro  6.3  /  Alb  2.2<L>  /  TBili  0.4  /  DBili  x   /  AST  119<H>  /  ALT  100<H>  /  AlkPhos  1143<H>  04-    PT/INR - ( 2020 08:30 )   PT: 14.1 sec;   INR: 1.24 ratio         PTT - ( 2020 17:45 )  PTT:123.9 sec  Urinalysis Basic - ( 2020 09:50 )    Color: Yellow / Appearance: Slightly Turbid / S.022 / pH: x  Gluc: x / Ketone: Small  / Bili: Negative / Urobili: Negative   Blood: x / Protein: 300 mg/dL / Nitrite: Negative   Leuk Esterase: Negative / RBC: 16 /hpf / WBC 27 /HPF   Sq Epi: x / Non Sq Epi: 11 /hpf / Bacteria: Negative

## 2020-04-23 NOTE — AIRWAY PLACEMENT NOTE ADULT - AIRWAY COMMENTS:
pt intubated in ED by MD
code blue called, MD Isserroff and I to bedside, ETT placed atraumatically. positive ETCO2 noted, ETT secured by RT.

## 2020-04-23 NOTE — PROGRESS NOTE ADULT - ASSESSMENT
Imelda Macias is a 65 y.o woman with a history of HTN, DM2, HLD, hypothyroidism who presented to the hospital in AHRF 2/2 COVID course c/b acute renal failure requiring dialysis with concern for MM who is now febrile

## 2020-04-23 NOTE — PROCEDURE NOTE - NSINFORMCONSENT_GEN_A_CORE
This was an emergent procedure.
This was an emergent procedure.
Benefits, risks, and possible complications of procedure explained to patient/caregiver who verbalized understanding and gave written consent.
This was an emergent procedure.
This was an emergent procedure.

## 2021-10-06 PROBLEM — I10 ESSENTIAL HYPERTENSION: Status: ACTIVE | Noted: 2018-12-12

## 2022-12-30 NOTE — DIETITIAN INITIAL EVALUATION ADULT. - OTHER INFO
Per chart, pt is a 64 y/o female p/w worsening SOB, intubated on admission, COVID-19+. Hx of DM noted, no AIC available to assess blood glucose control.    Unable to speak with pt due to current clinical status, pt remains intubated, +1 pressor, sedated on ketamine, precedex and propofol @12mL/hr (317 lipid kcals). Nephrology following for MARIO versus acute CKD, pt receiving IV lasix, renvela, and sodium bicarbonate- no current indication for HD at this time per nephrology.    OG tube in place for enteral nutrition support- order written for Pivot 1.5 @20mL/hr x24hr. Discussed with ASHVIN Rosa regarding formula change to Suplena per attending request in the setting of worsening renal function and rising BUN. Plan to switch to Suplena and maintain current rate @20mL/hr x24hr.     Skin: no pressure injuries  Edema: +3 generalized
no gum bleeding/no nose bleeding/no skin lumps